# Patient Record
Sex: FEMALE | Race: WHITE | NOT HISPANIC OR LATINO | Employment: OTHER | URBAN - METROPOLITAN AREA
[De-identification: names, ages, dates, MRNs, and addresses within clinical notes are randomized per-mention and may not be internally consistent; named-entity substitution may affect disease eponyms.]

---

## 2017-02-24 ENCOUNTER — APPOINTMENT (OUTPATIENT)
Dept: LAB | Facility: CLINIC | Age: 82
End: 2017-02-24
Payer: COMMERCIAL

## 2017-02-24 ENCOUNTER — TRANSCRIBE ORDERS (OUTPATIENT)
Dept: LAB | Facility: CLINIC | Age: 82
End: 2017-02-24

## 2017-02-24 DIAGNOSIS — I10 ESSENTIAL (PRIMARY) HYPERTENSION: ICD-10-CM

## 2017-02-24 DIAGNOSIS — R06.02 SHORTNESS OF BREATH: ICD-10-CM

## 2017-02-24 LAB
ALBUMIN SERPL BCP-MCNC: 3.5 G/DL (ref 3.5–5)
ALP SERPL-CCNC: 61 U/L (ref 46–116)
ALT SERPL W P-5'-P-CCNC: 12 U/L (ref 12–78)
ANION GAP SERPL CALCULATED.3IONS-SCNC: 5 MMOL/L (ref 4–13)
AST SERPL W P-5'-P-CCNC: 10 U/L (ref 5–45)
BASOPHILS # BLD AUTO: 0.02 THOUSANDS/ΜL (ref 0–0.1)
BASOPHILS NFR BLD AUTO: 0 % (ref 0–1)
BILIRUB SERPL-MCNC: 0.3 MG/DL (ref 0.2–1)
BUN SERPL-MCNC: 21 MG/DL (ref 5–25)
CALCIUM SERPL-MCNC: 8.9 MG/DL (ref 8.3–10.1)
CHLORIDE SERPL-SCNC: 102 MMOL/L (ref 100–108)
CHOLEST SERPL-MCNC: 144 MG/DL (ref 50–200)
CO2 SERPL-SCNC: 33 MMOL/L (ref 21–32)
CREAT SERPL-MCNC: 0.67 MG/DL (ref 0.6–1.3)
EOSINOPHIL # BLD AUTO: 0.19 THOUSAND/ΜL (ref 0–0.61)
EOSINOPHIL NFR BLD AUTO: 2 % (ref 0–6)
ERYTHROCYTE [DISTWIDTH] IN BLOOD BY AUTOMATED COUNT: 13.9 % (ref 11.6–15.1)
GFR SERPL CREATININE-BSD FRML MDRD: >60 ML/MIN/1.73SQ M
GLUCOSE SERPL-MCNC: 96 MG/DL (ref 65–140)
HCT VFR BLD AUTO: 40.8 % (ref 34.8–46.1)
HDLC SERPL-MCNC: 74 MG/DL (ref 40–60)
HGB BLD-MCNC: 12.8 G/DL (ref 11.5–15.4)
LDLC SERPL CALC-MCNC: 48 MG/DL (ref 0–100)
LYMPHOCYTES # BLD AUTO: 2.71 THOUSANDS/ΜL (ref 0.6–4.47)
LYMPHOCYTES NFR BLD AUTO: 31 % (ref 14–44)
MCH RBC QN AUTO: 31.2 PG (ref 26.8–34.3)
MCHC RBC AUTO-ENTMCNC: 31.4 G/DL (ref 31.4–37.4)
MCV RBC AUTO: 100 FL (ref 82–98)
MONOCYTES # BLD AUTO: 0.43 THOUSAND/ΜL (ref 0.17–1.22)
MONOCYTES NFR BLD AUTO: 5 % (ref 4–12)
NEUTROPHILS # BLD AUTO: 5.41 THOUSANDS/ΜL (ref 1.85–7.62)
NEUTS SEG NFR BLD AUTO: 62 % (ref 43–75)
NRBC BLD AUTO-RTO: 0 /100 WBCS
PLATELET # BLD AUTO: 329 THOUSANDS/UL (ref 149–390)
PMV BLD AUTO: 9.7 FL (ref 8.9–12.7)
POTASSIUM SERPL-SCNC: 4 MMOL/L (ref 3.5–5.3)
PROT SERPL-MCNC: 7.5 G/DL (ref 6.4–8.2)
RBC # BLD AUTO: 4.1 MILLION/UL (ref 3.81–5.12)
SODIUM SERPL-SCNC: 140 MMOL/L (ref 136–145)
TRIGL SERPL-MCNC: 111 MG/DL
WBC # BLD AUTO: 8.78 THOUSAND/UL (ref 4.31–10.16)

## 2017-02-24 PROCEDURE — 80061 LIPID PANEL: CPT

## 2017-02-24 PROCEDURE — 85025 COMPLETE CBC W/AUTO DIFF WBC: CPT

## 2017-02-24 PROCEDURE — 80053 COMPREHEN METABOLIC PANEL: CPT

## 2017-02-24 PROCEDURE — 36415 COLL VENOUS BLD VENIPUNCTURE: CPT

## 2017-03-01 ENCOUNTER — ALLSCRIPTS OFFICE VISIT (OUTPATIENT)
Dept: OTHER | Facility: OTHER | Age: 82
End: 2017-03-01

## 2017-06-13 ENCOUNTER — APPOINTMENT (EMERGENCY)
Dept: RADIOLOGY | Facility: HOSPITAL | Age: 82
End: 2017-06-13
Payer: COMMERCIAL

## 2017-06-13 ENCOUNTER — ANESTHESIA (EMERGENCY)
Dept: PERIOP | Facility: HOSPITAL | Age: 82
End: 2017-06-13
Payer: COMMERCIAL

## 2017-06-13 ENCOUNTER — GENERIC CONVERSION - ENCOUNTER (OUTPATIENT)
Dept: OTHER | Facility: OTHER | Age: 82
End: 2017-06-13

## 2017-06-13 ENCOUNTER — ANESTHESIA EVENT (EMERGENCY)
Dept: PERIOP | Facility: HOSPITAL | Age: 82
End: 2017-06-13
Payer: COMMERCIAL

## 2017-06-13 ENCOUNTER — HOSPITAL ENCOUNTER (EMERGENCY)
Facility: HOSPITAL | Age: 82
Discharge: HOME | End: 2017-06-14
Attending: EMERGENCY MEDICINE | Admitting: INTERNAL MEDICINE
Payer: COMMERCIAL

## 2017-06-13 DIAGNOSIS — T18.108A ESOPHAGEAL FOREIGN BODY, INITIAL ENCOUNTER: Primary | ICD-10-CM

## 2017-06-13 LAB
ALBUMIN SERPL BCP-MCNC: 3.7 G/DL (ref 3.5–5)
ALP SERPL-CCNC: 72 U/L (ref 46–116)
ALT SERPL W P-5'-P-CCNC: 14 U/L (ref 12–78)
ANION GAP SERPL CALCULATED.3IONS-SCNC: 6 MMOL/L (ref 4–13)
AST SERPL W P-5'-P-CCNC: 16 U/L (ref 5–45)
BASOPHILS # BLD AUTO: 0.06 THOUSANDS/ΜL (ref 0–0.1)
BASOPHILS NFR BLD AUTO: 1 % (ref 0–1)
BILIRUB DIRECT SERPL-MCNC: 0.12 MG/DL (ref 0–0.2)
BILIRUB SERPL-MCNC: 0.4 MG/DL (ref 0.2–1)
BUN SERPL-MCNC: 14 MG/DL (ref 5–25)
CALCIUM SERPL-MCNC: 9.5 MG/DL (ref 8.3–10.1)
CHLORIDE SERPL-SCNC: 101 MMOL/L (ref 100–108)
CO2 SERPL-SCNC: 33 MMOL/L (ref 21–32)
CREAT SERPL-MCNC: 0.65 MG/DL (ref 0.6–1.3)
EOSINOPHIL # BLD AUTO: 0.15 THOUSAND/ΜL (ref 0–0.61)
EOSINOPHIL NFR BLD AUTO: 2 % (ref 0–6)
ERYTHROCYTE [DISTWIDTH] IN BLOOD BY AUTOMATED COUNT: 12.7 % (ref 11.6–15.1)
GFR SERPL CREATININE-BSD FRML MDRD: >60 ML/MIN/1.73SQ M
GLUCOSE SERPL-MCNC: 139 MG/DL (ref 65–140)
HCT VFR BLD AUTO: 43.4 % (ref 34.8–46.1)
HGB BLD-MCNC: 13.6 G/DL (ref 11.5–15.4)
LYMPHOCYTES # BLD AUTO: 1.45 THOUSANDS/ΜL (ref 0.6–4.47)
LYMPHOCYTES NFR BLD AUTO: 15 % (ref 14–44)
MCH RBC QN AUTO: 30.9 PG (ref 26.8–34.3)
MCHC RBC AUTO-ENTMCNC: 31.3 G/DL (ref 31.4–37.4)
MCV RBC AUTO: 99 FL (ref 82–98)
MONOCYTES # BLD AUTO: 0.38 THOUSAND/ΜL (ref 0.17–1.22)
MONOCYTES NFR BLD AUTO: 4 % (ref 4–12)
NEUTROPHILS # BLD AUTO: 7.84 THOUSANDS/ΜL (ref 1.85–7.62)
NEUTS SEG NFR BLD AUTO: 79 % (ref 43–75)
NRBC BLD AUTO-RTO: 0 /100 WBCS
PLATELET # BLD AUTO: 286 THOUSANDS/UL (ref 149–390)
PMV BLD AUTO: 8.8 FL (ref 8.9–12.7)
POTASSIUM SERPL-SCNC: 4.5 MMOL/L (ref 3.5–5.3)
PROT SERPL-MCNC: 7.9 G/DL (ref 6.4–8.2)
RBC # BLD AUTO: 4.4 MILLION/UL (ref 3.81–5.12)
SODIUM SERPL-SCNC: 140 MMOL/L (ref 136–145)
TROPONIN I SERPL-MCNC: <0.02 NG/ML
WBC # BLD AUTO: 9.93 THOUSAND/UL (ref 4.31–10.16)

## 2017-06-13 PROCEDURE — 71010 HB CHEST X-RAY 1 VIEW FRONTAL (PORTABLE): CPT

## 2017-06-13 PROCEDURE — 84484 ASSAY OF TROPONIN QUANT: CPT | Performed by: EMERGENCY MEDICINE

## 2017-06-13 PROCEDURE — 80048 BASIC METABOLIC PNL TOTAL CA: CPT | Performed by: EMERGENCY MEDICINE

## 2017-06-13 PROCEDURE — 80076 HEPATIC FUNCTION PANEL: CPT | Performed by: EMERGENCY MEDICINE

## 2017-06-13 PROCEDURE — 36415 COLL VENOUS BLD VENIPUNCTURE: CPT | Performed by: EMERGENCY MEDICINE

## 2017-06-13 PROCEDURE — 94640 AIRWAY INHALATION TREATMENT: CPT

## 2017-06-13 PROCEDURE — 99285 EMERGENCY DEPT VISIT HI MDM: CPT

## 2017-06-13 PROCEDURE — 85025 COMPLETE CBC W/AUTO DIFF WBC: CPT | Performed by: EMERGENCY MEDICINE

## 2017-06-13 PROCEDURE — 93005 ELECTROCARDIOGRAM TRACING: CPT | Performed by: EMERGENCY MEDICINE

## 2017-06-13 RX ORDER — PROPOFOL 10 MG/ML
INJECTION, EMULSION INTRAVENOUS AS NEEDED
Status: DISCONTINUED | OUTPATIENT
Start: 2017-06-13 | End: 2017-06-13 | Stop reason: SURG

## 2017-06-13 RX ORDER — ASPIRIN 81 MG/1
81 TABLET, CHEWABLE ORAL DAILY
COMMUNITY
End: 2019-03-15 | Stop reason: ALTCHOICE

## 2017-06-13 RX ORDER — PANTOPRAZOLE SODIUM 40 MG/1
40 TABLET, DELAYED RELEASE ORAL DAILY
Qty: 30 TABLET | Refills: 3 | Status: SHIPPED | OUTPATIENT
Start: 2017-06-13 | End: 2017-10-14

## 2017-06-13 RX ORDER — SODIUM CHLORIDE 9 MG/ML
INJECTION, SOLUTION INTRAVENOUS CONTINUOUS PRN
Status: DISCONTINUED | OUTPATIENT
Start: 2017-06-13 | End: 2017-06-13 | Stop reason: SURG

## 2017-06-13 RX ORDER — ESMOLOL HYDROCHLORIDE 10 MG/ML
INJECTION INTRAVENOUS AS NEEDED
Status: DISCONTINUED | OUTPATIENT
Start: 2017-06-13 | End: 2017-06-13 | Stop reason: SURG

## 2017-06-13 RX ORDER — ALBUTEROL SULFATE 2.5 MG/3ML
5 SOLUTION RESPIRATORY (INHALATION) ONCE
Status: COMPLETED | OUTPATIENT
Start: 2017-06-13 | End: 2017-06-13

## 2017-06-13 RX ORDER — ONDANSETRON 2 MG/ML
INJECTION INTRAMUSCULAR; INTRAVENOUS AS NEEDED
Status: DISCONTINUED | OUTPATIENT
Start: 2017-06-13 | End: 2017-06-13 | Stop reason: SURG

## 2017-06-13 RX ORDER — SUCCINYLCHOLINE/SOD CL,ISO/PF 100 MG/5ML
SYRINGE (ML) INTRAVENOUS AS NEEDED
Status: DISCONTINUED | OUTPATIENT
Start: 2017-06-13 | End: 2017-06-13 | Stop reason: SURG

## 2017-06-13 RX ORDER — ROSUVASTATIN CALCIUM 10 MG/1
10 TABLET, COATED ORAL DAILY
COMMUNITY
End: 2018-09-11 | Stop reason: SDUPTHER

## 2017-06-13 RX ORDER — CLOPIDOGREL BISULFATE 75 MG/1
75 TABLET ORAL DAILY
COMMUNITY
End: 2018-09-11 | Stop reason: SDUPTHER

## 2017-06-13 RX ADMIN — ONDANSETRON 4 MG: 2 INJECTION INTRAMUSCULAR; INTRAVENOUS at 23:18

## 2017-06-13 RX ADMIN — ALBUTEROL SULFATE 5 MG: 2.5 SOLUTION RESPIRATORY (INHALATION) at 22:23

## 2017-06-13 RX ADMIN — ESMOLOL HYDROCHLORIDE 10 MG: 10 INJECTION, SOLUTION INTRAVENOUS at 23:28

## 2017-06-13 RX ADMIN — Medication 100 MG: at 23:12

## 2017-06-13 RX ADMIN — IPRATROPIUM BROMIDE 0.5 MG: 0.5 SOLUTION RESPIRATORY (INHALATION) at 22:23

## 2017-06-13 RX ADMIN — SODIUM CHLORIDE: 0.9 INJECTION, SOLUTION INTRAVENOUS at 22:49

## 2017-06-13 RX ADMIN — PROPOFOL 120 MG: 10 INJECTION, EMULSION INTRAVENOUS at 23:12

## 2017-06-14 VITALS
BODY MASS INDEX: 25.6 KG/M2 | OXYGEN SATURATION: 94 % | RESPIRATION RATE: 20 BRPM | HEART RATE: 73 BPM | SYSTOLIC BLOOD PRESSURE: 139 MMHG | WEIGHT: 139.11 LBS | HEIGHT: 62 IN | TEMPERATURE: 99.3 F | DIASTOLIC BLOOD PRESSURE: 58 MMHG

## 2017-06-14 LAB
ATRIAL RATE: 67 BPM
P AXIS: 81 DEGREES
PR INTERVAL: 218 MS
QRS AXIS: 112 DEGREES
QRSD INTERVAL: 68 MS
QT INTERVAL: 404 MS
QTC INTERVAL: 426 MS
T WAVE AXIS: 79 DEGREES
VENTRICULAR RATE: 67 BPM

## 2017-08-14 ENCOUNTER — ALLSCRIPTS OFFICE VISIT (OUTPATIENT)
Dept: OTHER | Facility: OTHER | Age: 82
End: 2017-08-14

## 2017-10-14 ENCOUNTER — APPOINTMENT (EMERGENCY)
Dept: RADIOLOGY | Facility: HOSPITAL | Age: 82
DRG: 189 | End: 2017-10-14
Payer: COMMERCIAL

## 2017-10-14 ENCOUNTER — APPOINTMENT (EMERGENCY)
Dept: CT IMAGING | Facility: HOSPITAL | Age: 82
DRG: 189 | End: 2017-10-14
Payer: COMMERCIAL

## 2017-10-14 ENCOUNTER — HOSPITAL ENCOUNTER (INPATIENT)
Facility: HOSPITAL | Age: 82
LOS: 6 days | Discharge: HOME HEALTH | DRG: 189 | End: 2017-10-21
Attending: EMERGENCY MEDICINE | Admitting: INTERNAL MEDICINE
Payer: COMMERCIAL

## 2017-10-14 DIAGNOSIS — R53.1 WEAKNESS: ICD-10-CM

## 2017-10-14 DIAGNOSIS — J44.1 COPD EXACERBATION (HCC): Primary | ICD-10-CM

## 2017-10-14 LAB
ALBUMIN SERPL BCP-MCNC: 3.3 G/DL (ref 3.5–5)
ALP SERPL-CCNC: 80 U/L (ref 46–116)
ALT SERPL W P-5'-P-CCNC: 12 U/L (ref 12–78)
ANION GAP SERPL CALCULATED.3IONS-SCNC: 6 MMOL/L (ref 4–13)
AST SERPL W P-5'-P-CCNC: 7 U/L (ref 5–45)
BACTERIA UR QL AUTO: ABNORMAL /HPF
BASOPHILS # BLD MANUAL: 0 THOUSAND/UL (ref 0–0.1)
BASOPHILS NFR MAR MANUAL: 0 % (ref 0–1)
BILIRUB SERPL-MCNC: 0.5 MG/DL (ref 0.2–1)
BILIRUB UR QL STRIP: NEGATIVE
BUN SERPL-MCNC: 18 MG/DL (ref 5–25)
CALCIUM SERPL-MCNC: 9 MG/DL (ref 8.3–10.1)
CHLORIDE SERPL-SCNC: 100 MMOL/L (ref 100–108)
CLARITY UR: ABNORMAL
CO2 SERPL-SCNC: 31 MMOL/L (ref 21–32)
COLOR UR: YELLOW
CREAT SERPL-MCNC: 0.83 MG/DL (ref 0.6–1.3)
EOSINOPHIL # BLD MANUAL: 0 THOUSAND/UL (ref 0–0.4)
EOSINOPHIL NFR BLD MANUAL: 0 % (ref 0–6)
ERYTHROCYTE [DISTWIDTH] IN BLOOD BY AUTOMATED COUNT: 12.5 % (ref 11.6–15.1)
GFR SERPL CREATININE-BSD FRML MDRD: 64 ML/MIN/1.73SQ M
GLUCOSE SERPL-MCNC: 137 MG/DL (ref 65–140)
GLUCOSE UR STRIP-MCNC: NEGATIVE MG/DL
HCT VFR BLD AUTO: 39.1 % (ref 34.8–46.1)
HGB BLD-MCNC: 12.6 G/DL (ref 11.5–15.4)
HGB UR QL STRIP.AUTO: ABNORMAL
HOLD SPECIMEN: NORMAL
KETONES UR STRIP-MCNC: ABNORMAL MG/DL
LEUKOCYTE ESTERASE UR QL STRIP: NEGATIVE
LYMPHOCYTES # BLD AUTO: 0.91 THOUSAND/UL (ref 0.6–4.47)
LYMPHOCYTES # BLD AUTO: 6 % (ref 14–44)
MCH RBC QN AUTO: 30.8 PG (ref 26.8–34.3)
MCHC RBC AUTO-ENTMCNC: 32.2 G/DL (ref 31.4–37.4)
MCV RBC AUTO: 96 FL (ref 82–98)
MONOCYTES # BLD AUTO: 0.3 THOUSAND/UL (ref 0–1.22)
MONOCYTES NFR BLD: 2 % (ref 4–12)
NEUTROPHILS # BLD MANUAL: 13.97 THOUSAND/UL (ref 1.85–7.62)
NEUTS SEG NFR BLD AUTO: 92 % (ref 43–75)
NITRITE UR QL STRIP: NEGATIVE
NON-SQ EPI CELLS URNS QL MICRO: ABNORMAL /HPF
NRBC BLD AUTO-RTO: 0 /100 WBCS
PH UR STRIP.AUTO: 5.5 [PH] (ref 4.5–8)
PLATELET # BLD AUTO: 295 THOUSANDS/UL (ref 149–390)
PLATELET BLD QL SMEAR: ADEQUATE
PMV BLD AUTO: 8.7 FL (ref 8.9–12.7)
POTASSIUM SERPL-SCNC: 4 MMOL/L (ref 3.5–5.3)
PROT SERPL-MCNC: 7.8 G/DL (ref 6.4–8.2)
PROT UR STRIP-MCNC: NEGATIVE MG/DL
RBC # BLD AUTO: 4.09 MILLION/UL (ref 3.81–5.12)
RBC #/AREA URNS AUTO: ABNORMAL /HPF
SODIUM SERPL-SCNC: 137 MMOL/L (ref 136–145)
SP GR UR STRIP.AUTO: <=1.005 (ref 1–1.03)
TOTAL CELLS COUNTED SPEC: 100
UROBILINOGEN UR QL STRIP.AUTO: 2 E.U./DL
WBC # BLD AUTO: 15.18 THOUSAND/UL (ref 4.31–10.16)
WBC #/AREA URNS AUTO: ABNORMAL /HPF

## 2017-10-14 PROCEDURE — 71020 HB CHEST X-RAY 2VW FRONTAL&LATL: CPT

## 2017-10-14 PROCEDURE — 85007 BL SMEAR W/DIFF WBC COUNT: CPT | Performed by: EMERGENCY MEDICINE

## 2017-10-14 PROCEDURE — 96375 TX/PRO/DX INJ NEW DRUG ADDON: CPT

## 2017-10-14 PROCEDURE — 96361 HYDRATE IV INFUSION ADD-ON: CPT

## 2017-10-14 PROCEDURE — 93005 ELECTROCARDIOGRAM TRACING: CPT | Performed by: EMERGENCY MEDICINE

## 2017-10-14 PROCEDURE — 85027 COMPLETE CBC AUTOMATED: CPT | Performed by: EMERGENCY MEDICINE

## 2017-10-14 PROCEDURE — 74177 CT ABD & PELVIS W/CONTRAST: CPT

## 2017-10-14 PROCEDURE — 80053 COMPREHEN METABOLIC PANEL: CPT | Performed by: EMERGENCY MEDICINE

## 2017-10-14 PROCEDURE — 94640 AIRWAY INHALATION TREATMENT: CPT

## 2017-10-14 PROCEDURE — 81001 URINALYSIS AUTO W/SCOPE: CPT | Performed by: EMERGENCY MEDICINE

## 2017-10-14 PROCEDURE — 96374 THER/PROPH/DIAG INJ IV PUSH: CPT

## 2017-10-14 PROCEDURE — 71275 CT ANGIOGRAPHY CHEST: CPT

## 2017-10-14 PROCEDURE — 36415 COLL VENOUS BLD VENIPUNCTURE: CPT

## 2017-10-14 RX ORDER — ALBUTEROL SULFATE 2.5 MG/3ML
2.5 SOLUTION RESPIRATORY (INHALATION) ONCE
Status: COMPLETED | OUTPATIENT
Start: 2017-10-14 | End: 2017-10-14

## 2017-10-14 RX ORDER — PANTOPRAZOLE SODIUM 40 MG/1
TABLET, DELAYED RELEASE ORAL
COMMUNITY
Start: 2017-06-16 | End: 2018-03-02 | Stop reason: SDUPTHER

## 2017-10-14 RX ORDER — ONDANSETRON 2 MG/ML
4 INJECTION INTRAMUSCULAR; INTRAVENOUS ONCE
Status: COMPLETED | OUTPATIENT
Start: 2017-10-14 | End: 2017-10-14

## 2017-10-14 RX ORDER — FENTANYL CITRATE 50 UG/ML
50 INJECTION, SOLUTION INTRAMUSCULAR; INTRAVENOUS ONCE
Status: COMPLETED | OUTPATIENT
Start: 2017-10-14 | End: 2017-10-14

## 2017-10-14 RX ADMIN — IOHEXOL 100 ML: 350 INJECTION, SOLUTION INTRAVENOUS at 22:36

## 2017-10-14 RX ADMIN — ALBUTEROL SULFATE 2.5 MG: 2.5 SOLUTION RESPIRATORY (INHALATION) at 21:19

## 2017-10-14 RX ADMIN — FENTANYL CITRATE 50 MCG: 50 INJECTION INTRAMUSCULAR; INTRAVENOUS at 23:17

## 2017-10-14 RX ADMIN — SODIUM CHLORIDE 500 ML: 0.9 INJECTION, SOLUTION INTRAVENOUS at 21:20

## 2017-10-14 RX ADMIN — ONDANSETRON 4 MG: 2 INJECTION INTRAMUSCULAR; INTRAVENOUS at 20:11

## 2017-10-14 RX ADMIN — IPRATROPIUM BROMIDE 0.5 MG: 0.5 SOLUTION RESPIRATORY (INHALATION) at 21:19

## 2017-10-15 PROBLEM — J44.1 COPD EXACERBATION (HCC): Status: ACTIVE | Noted: 2017-10-15

## 2017-10-15 PROBLEM — I10 HTN (HYPERTENSION): Chronic | Status: ACTIVE | Noted: 2017-10-15

## 2017-10-15 PROBLEM — I25.10 CAD (CORONARY ARTERY DISEASE): Chronic | Status: ACTIVE | Noted: 2017-10-15

## 2017-10-15 LAB
ANION GAP SERPL CALCULATED.3IONS-SCNC: 8 MMOL/L (ref 4–13)
ATRIAL RATE: 101 BPM
BUN SERPL-MCNC: 15 MG/DL (ref 5–25)
CALCIUM SERPL-MCNC: 8.8 MG/DL (ref 8.3–10.1)
CHLORIDE SERPL-SCNC: 100 MMOL/L (ref 100–108)
CO2 SERPL-SCNC: 30 MMOL/L (ref 21–32)
CREAT SERPL-MCNC: 0.7 MG/DL (ref 0.6–1.3)
ERYTHROCYTE [DISTWIDTH] IN BLOOD BY AUTOMATED COUNT: 12.8 % (ref 11.6–15.1)
GFR SERPL CREATININE-BSD FRML MDRD: 78 ML/MIN/1.73SQ M
GLUCOSE P FAST SERPL-MCNC: 133 MG/DL (ref 65–99)
GLUCOSE SERPL-MCNC: 133 MG/DL (ref 65–140)
HCT VFR BLD AUTO: 36.9 % (ref 34.8–46.1)
HGB BLD-MCNC: 11.6 G/DL (ref 11.5–15.4)
MCH RBC QN AUTO: 30.7 PG (ref 26.8–34.3)
MCHC RBC AUTO-ENTMCNC: 31.4 G/DL (ref 31.4–37.4)
MCV RBC AUTO: 98 FL (ref 82–98)
P AXIS: 95 DEGREES
PLATELET # BLD AUTO: 287 THOUSANDS/UL (ref 149–390)
PMV BLD AUTO: 9.9 FL (ref 8.9–12.7)
POTASSIUM SERPL-SCNC: 4.2 MMOL/L (ref 3.5–5.3)
PR INTERVAL: 218 MS
QRS AXIS: 133 DEGREES
QRSD INTERVAL: 86 MS
QT INTERVAL: 344 MS
QTC INTERVAL: 446 MS
RBC # BLD AUTO: 3.78 MILLION/UL (ref 3.81–5.12)
SODIUM SERPL-SCNC: 138 MMOL/L (ref 136–145)
T WAVE AXIS: 75 DEGREES
TROPONIN I SERPL-MCNC: <0.02 NG/ML
VENTRICULAR RATE: 101 BPM
WBC # BLD AUTO: 13.62 THOUSAND/UL (ref 4.31–10.16)

## 2017-10-15 PROCEDURE — 80048 BASIC METABOLIC PNL TOTAL CA: CPT | Performed by: PHYSICIAN ASSISTANT

## 2017-10-15 PROCEDURE — 94760 N-INVAS EAR/PLS OXIMETRY 1: CPT

## 2017-10-15 PROCEDURE — 94640 AIRWAY INHALATION TREATMENT: CPT

## 2017-10-15 PROCEDURE — 84484 ASSAY OF TROPONIN QUANT: CPT | Performed by: INTERNAL MEDICINE

## 2017-10-15 PROCEDURE — 99285 EMERGENCY DEPT VISIT HI MDM: CPT

## 2017-10-15 PROCEDURE — 85027 COMPLETE CBC AUTOMATED: CPT | Performed by: PHYSICIAN ASSISTANT

## 2017-10-15 RX ORDER — METHYLPREDNISOLONE SODIUM SUCCINATE 40 MG/ML
40 INJECTION, POWDER, LYOPHILIZED, FOR SOLUTION INTRAMUSCULAR; INTRAVENOUS EVERY 8 HOURS SCHEDULED
Status: DISCONTINUED | OUTPATIENT
Start: 2017-10-15 | End: 2017-10-16

## 2017-10-15 RX ORDER — CALCIUM CARBONATE 200(500)MG
1000 TABLET,CHEWABLE ORAL DAILY PRN
Status: DISCONTINUED | OUTPATIENT
Start: 2017-10-15 | End: 2017-10-21 | Stop reason: HOSPADM

## 2017-10-15 RX ORDER — IPRATROPIUM BROMIDE AND ALBUTEROL SULFATE 2.5; .5 MG/3ML; MG/3ML
3 SOLUTION RESPIRATORY (INHALATION)
Status: DISCONTINUED | OUTPATIENT
Start: 2017-10-15 | End: 2017-10-15

## 2017-10-15 RX ORDER — SODIUM CHLORIDE FOR INHALATION 0.9 %
3 VIAL, NEBULIZER (ML) INHALATION EVERY 4 HOURS PRN
Status: DISCONTINUED | OUTPATIENT
Start: 2017-10-15 | End: 2017-10-18

## 2017-10-15 RX ORDER — PRAVASTATIN SODIUM 80 MG/1
80 TABLET ORAL
Status: DISCONTINUED | OUTPATIENT
Start: 2017-10-15 | End: 2017-10-21 | Stop reason: HOSPADM

## 2017-10-15 RX ORDER — AZITHROMYCIN 250 MG/1
500 TABLET, FILM COATED ORAL DAILY
Status: DISCONTINUED | OUTPATIENT
Start: 2017-10-15 | End: 2017-10-15

## 2017-10-15 RX ORDER — CLOPIDOGREL BISULFATE 75 MG/1
75 TABLET ORAL DAILY
Status: DISCONTINUED | OUTPATIENT
Start: 2017-10-15 | End: 2017-10-21 | Stop reason: HOSPADM

## 2017-10-15 RX ORDER — ALBUTEROL SULFATE 2.5 MG/3ML
2.5 SOLUTION RESPIRATORY (INHALATION) EVERY 4 HOURS PRN
Status: DISCONTINUED | OUTPATIENT
Start: 2017-10-15 | End: 2017-10-21 | Stop reason: HOSPADM

## 2017-10-15 RX ORDER — METHYLPREDNISOLONE SODIUM SUCCINATE 40 MG/ML
40 INJECTION, POWDER, LYOPHILIZED, FOR SOLUTION INTRAMUSCULAR; INTRAVENOUS EVERY 12 HOURS SCHEDULED
Status: DISCONTINUED | OUTPATIENT
Start: 2017-10-15 | End: 2017-10-15

## 2017-10-15 RX ORDER — DOCUSATE SODIUM 100 MG/1
100 CAPSULE, LIQUID FILLED ORAL 2 TIMES DAILY
Status: DISCONTINUED | OUTPATIENT
Start: 2017-10-15 | End: 2017-10-21 | Stop reason: HOSPADM

## 2017-10-15 RX ORDER — AZITHROMYCIN 250 MG/1
500 TABLET, FILM COATED ORAL DAILY
Status: DISCONTINUED | OUTPATIENT
Start: 2017-10-16 | End: 2017-10-15

## 2017-10-15 RX ORDER — ASPIRIN 81 MG/1
81 TABLET, CHEWABLE ORAL DAILY
Status: DISCONTINUED | OUTPATIENT
Start: 2017-10-15 | End: 2017-10-21 | Stop reason: HOSPADM

## 2017-10-15 RX ORDER — MORPHINE SULFATE 2 MG/ML
2 INJECTION, SOLUTION INTRAMUSCULAR; INTRAVENOUS EVERY 4 HOURS PRN
Status: DISCONTINUED | OUTPATIENT
Start: 2017-10-15 | End: 2017-10-21 | Stop reason: HOSPADM

## 2017-10-15 RX ORDER — HYDROCODONE BITARTRATE AND ACETAMINOPHEN 5; 325 MG/1; MG/1
1 TABLET ORAL EVERY 4 HOURS PRN
Status: DISCONTINUED | OUTPATIENT
Start: 2017-10-15 | End: 2017-10-21 | Stop reason: HOSPADM

## 2017-10-15 RX ORDER — IPRATROPIUM BROMIDE AND ALBUTEROL SULFATE 2.5; .5 MG/3ML; MG/3ML
3 SOLUTION RESPIRATORY (INHALATION)
Status: DISCONTINUED | OUTPATIENT
Start: 2017-10-15 | End: 2017-10-20

## 2017-10-15 RX ORDER — MORPHINE SULFATE 2 MG/ML
1 INJECTION, SOLUTION INTRAMUSCULAR; INTRAVENOUS EVERY 4 HOURS PRN
Status: DISCONTINUED | OUTPATIENT
Start: 2017-10-15 | End: 2017-10-15

## 2017-10-15 RX ORDER — AZITHROMYCIN 250 MG/1
500 TABLET, FILM COATED ORAL DAILY
Status: DISCONTINUED | OUTPATIENT
Start: 2017-10-16 | End: 2017-10-16

## 2017-10-15 RX ORDER — LIDOCAINE 50 MG/G
2 PATCH TOPICAL DAILY
Status: DISCONTINUED | OUTPATIENT
Start: 2017-10-15 | End: 2017-10-18

## 2017-10-15 RX ORDER — BENZONATATE 100 MG/1
100 CAPSULE ORAL 3 TIMES DAILY PRN
Status: DISCONTINUED | OUTPATIENT
Start: 2017-10-15 | End: 2017-10-21 | Stop reason: HOSPADM

## 2017-10-15 RX ADMIN — ASPIRIN 81 MG 81 MG: 81 TABLET ORAL at 09:22

## 2017-10-15 RX ADMIN — METOPROLOL TARTRATE 25 MG: 25 TABLET ORAL at 09:22

## 2017-10-15 RX ADMIN — ENOXAPARIN SODIUM 30 MG: 30 INJECTION SUBCUTANEOUS at 09:23

## 2017-10-15 RX ADMIN — LIDOCAINE 2 PATCH: 50 PATCH CUTANEOUS at 11:07

## 2017-10-15 RX ADMIN — IPRATROPIUM BROMIDE AND ALBUTEROL SULFATE 3 ML: .5; 3 SOLUTION RESPIRATORY (INHALATION) at 21:44

## 2017-10-15 RX ADMIN — HYDROCODONE BITARTRATE AND ACETAMINOPHEN 1 TABLET: 5; 325 TABLET ORAL at 12:06

## 2017-10-15 RX ADMIN — METHYLPREDNISOLONE SODIUM SUCCINATE 40 MG: 40 INJECTION, POWDER, FOR SOLUTION INTRAMUSCULAR; INTRAVENOUS at 21:49

## 2017-10-15 RX ADMIN — IPRATROPIUM BROMIDE AND ALBUTEROL SULFATE 3 ML: .5; 3 SOLUTION RESPIRATORY (INHALATION) at 13:08

## 2017-10-15 RX ADMIN — METHYLPREDNISOLONE SODIUM SUCCINATE 40 MG: 40 INJECTION, POWDER, FOR SOLUTION INTRAMUSCULAR; INTRAVENOUS at 15:00

## 2017-10-15 RX ADMIN — DOCUSATE SODIUM 100 MG: 100 CAPSULE, LIQUID FILLED ORAL at 17:19

## 2017-10-15 RX ADMIN — METHYLPREDNISOLONE SODIUM SUCCINATE 40 MG: 40 INJECTION, POWDER, FOR SOLUTION INTRAMUSCULAR; INTRAVENOUS at 09:23

## 2017-10-15 RX ADMIN — AZITHROMYCIN 500 MG: 250 TABLET, FILM COATED ORAL at 05:30

## 2017-10-15 RX ADMIN — MORPHINE SULFATE 1 MG: 2 INJECTION, SOLUTION INTRAMUSCULAR; INTRAVENOUS at 09:19

## 2017-10-15 RX ADMIN — DOCUSATE SODIUM 100 MG: 100 CAPSULE, LIQUID FILLED ORAL at 09:22

## 2017-10-15 RX ADMIN — METOPROLOL TARTRATE 25 MG: 25 TABLET ORAL at 17:19

## 2017-10-15 RX ADMIN — CLOPIDOGREL BISULFATE 75 MG: 75 TABLET ORAL at 09:22

## 2017-10-15 NOTE — PLAN OF CARE
DISCHARGE PLANNING     Discharge to home or other facility with appropriate resources Progressing        INFECTION - ADULT     Absence or prevention of progression during hospitalization Progressing     Absence of fever/infection during neutropenic period Progressing        Knowledge Deficit     Patient/family/caregiver demonstrates understanding of disease process, treatment plan, medications, and discharge instructions Progressing        PAIN - ADULT     Verbalizes/displays adequate comfort level or baseline comfort level Progressing        Potential for Falls     Patient will remain free of falls Progressing        SAFETY ADULT     Maintain or return to baseline ADL function Progressing     Maintain or return mobility status to optimal level Progressing     Patient will remain free of falls Progressing

## 2017-10-15 NOTE — ED NOTES
TREV at bedside      [facility], [address] [place] [address]  10/15/17 5494

## 2017-10-15 NOTE — ED PROVIDER NOTES
History  Chief Complaint   Patient presents with    Flank Pain     Pt c/o left sided flank pain that started 3 days ago  Pt reports weakness and N/V  I did not see this patient  Please reassign to Dr Jade Abel  Prior to Admission Medications   Prescriptions Last Dose Informant Patient Reported? Taking?   aspirin 81 mg chewable tablet   Yes Yes   Sig: Chew 81 mg daily   clopidogrel (PLAVIX) 75 mg tablet   Yes Yes   Sig: Take 75 mg by mouth daily   metoprolol tartrate (LOPRESSOR) 25 mg tablet   Yes Yes   Sig: Take 25 mg by mouth 2 (two) times a day   pantoprazole (PROTONIX) 40 mg tablet   Yes Yes   Sig: Take by mouth   rosuvastatin (CRESTOR) 10 MG tablet   Yes Yes   Sig: Take 10 mg by mouth daily      Facility-Administered Medications: None       Past Medical History:   Diagnosis Date    Cardiac disease     COPD (chronic obstructive pulmonary disease) (HCC)     Hypertension     MI (myocardial infarction)     Stroke [facility])        Past Surgical History:   Procedure Laterality Date    APPENDECTOMY      BREAST SURGERY      CHOLECYSTECTOMY      ESOPHAGUS FOREIGN BODY REMOVAL N/A 6/13/2017    Procedure: REMOVAL FOREIGN BODY ESOPHAGUS;  Surgeon: Trinh Barrera MD;  Location: [facility] OR;  Service: Gastroenterology       History reviewed  No pertinent family history  I have reviewed and agree with the history as documented      Social History   Substance Use Topics    Smoking status: Former Smoker    Smokeless tobacco: Former User    Alcohol use No        Review of Systems    Physical Exam  ED Triage Vitals [10/14/17 1914]   Temperature Pulse Respirations Blood Pressure SpO2   98 3 °F (36 8 °C) 102 22 141/84 94 %      Temp Source Heart Rate Source Patient Position - Orthostatic VS BP Location FiO2 (%)   Oral Monitor Lying Right arm --      Pain Score       5           Physical Exam    ED Medications  Medications   aspirin chewable tablet 81 mg (81 mg Oral Given 10/15/17 0922)   clopidogrel (PLAVIX) tablet 75 mg (75 mg Oral Given 10/15/17 0922)   metoprolol tartrate (LOPRESSOR) tablet 25 mg (25 mg Oral Given 10/15/17 0922)   pravastatin (PRAVACHOL) tablet 80 mg (not administered)   docusate sodium (COLACE) capsule 100 mg (100 mg Oral Given 10/15/17 0922)   calcium carbonate (TUMS) chewable tablet 1,000 mg (not administered)   albuterol inhalation solution 2 5 mg (not administered)     And   sodium chloride 0 9 % inhalation solution 3 mL (not administered)   enoxaparin (LOVENOX) subcutaneous injection 30 mg (30 mg Subcutaneous Given 10/15/17 0923)   lidocaine (LIDODERM) 5 % patch 2 patch (not administered)   HYDROcodone-acetaminophen (NORCO) 5-325 mg per tablet 1 tablet (not administered)   methylPREDNISolone sodium succinate (Solu-MEDROL) injection 40 mg (not administered)   morphine injection 2 mg (not administered)   azithromycin (ZITHROMAX) tablet 500 mg (not administered)   benzonatate (TESSALON PERLES) capsule 100 mg (not administered)   ipratropium-albuterol (DUO-NEB) 0 5-2 5 mg/mL inhalation solution 3 mL (not administered)   ondansetron (ZOFRAN) injection 4 mg (4 mg Intravenous Given 10/14/17 2011)   albuterol inhalation solution 2 5 mg (2 5 mg Nebulization Given 10/14/17 2119)   ipratropium (ATROVENT) 0 02 % inhalation solution 0 5 mg (0 5 mg Nebulization Given 10/14/17 2119)   sodium chloride 0 9 % bolus 500 mL (0 mL Intravenous Stopped 10/14/17 2220)   iohexol (OMNIPAQUE) 350 MG/ML injection (MULTI-DOSE) 100 mL (100 mL Intravenous Given 10/14/17 2236)   fentanyl citrate (PF) 100 MCG/2ML 50 mcg (50 mcg Intravenous Given 10/14/17 2317)       Diagnostic Studies  Labs Reviewed   CBC AND DIFFERENTIAL - Abnormal        Result Value Ref Range Status    WBC 15 18 (*) 4 31 - 10 16 Thousand/uL Final    MPV 8 7 (*) 8 9 - 12 7 fL Final    RBC 4 09  3 81 - 5 12 Million/uL Final    Hemoglobin 12 6  11 5 - 15 4 g/dL Final    Hematocrit 39 1  34 8 - 46 1 % Final    MCV 96  82 - 98 fL Final    MCH 30 8  26 8 - 34 3 pg Final    MCHC 32 2  31 4 - 37 4 g/dL Final    RDW 12 5  11 6 - 15 1 % Final    Platelets 967  099 - 390 Thousands/uL Final    nRBC 0  /100 WBCs Final   COMPREHENSIVE METABOLIC PANEL - Abnormal     Albumin 3 3 (*) 3 5 - 5 0 g/dL Final    Sodium 137  136 - 145 mmol/L Final    Potassium 4 0  3 5 - 5 3 mmol/L Final    Chloride 100  100 - 108 mmol/L Final    CO2 31  21 - 32 mmol/L Final    Anion Gap 6  4 - 13 mmol/L Final    BUN 18  5 - 25 mg/dL Final    Creatinine 0 83  0 60 - 1 30 mg/dL Final    Comment: Standardized to IDMS reference method    Glucose 137  65 - 140 mg/dL Final    Comment:   If the patient is fasting, the ADA then defines impaired fasting glucose as > 100 mg/dL and diabetes as > or equal to 123 mg/dL  Specimen collection should occur prior to Sulfasalazine administration due to the potential for falsely depressed results  Specimen collection should occur prior to Sulfapyridine administration due to the potential for falsely elevated results  Calcium 9 0  8 3 - 10 1 mg/dL Final    AST 7  5 - 45 U/L Final    Comment:   Specimen collection should occur prior to Sulfasalazine administration due to the potential for falsely depressed results  ALT 12  12 - 78 U/L Final    Comment:   Specimen collection should occur prior to Sulfasalazine administration due to the potential for falsely depressed results  Alkaline Phosphatase 80  46 - 116 U/L Final    Total Protein 7 8  6 4 - 8 2 g/dL Final    Total Bilirubin 0 50  0 20 - 1 00 mg/dL Final    eGFR 64  ml/min/1 73sq m Final    Narrative:     National Kidney Disease Education Program recommendations are as follows:  GFR calculation is accurate only with a steady state creatinine  Chronic Kidney disease less than 60 ml/min/1 73 sq  meters  Kidney failure less than 15 ml/min/1 73 sq  meters     UA W REFLEX TO MICROSCOPIC WITH REFLEX TO CULTURE - Abnormal     Ketones, UA 15 (1+) (*) Negative mg/dl Final    Urobilinogen, UA 2 0 (*) 0 2, 1 0 E U /dl E U /dl Final    Blood, UA Small (*) Negative Final    Color, UA Yellow   Final    Clarity, UA Slightly Cloudy   Final    Specific Gravity, UA <=1 005  1 003 - 1 030 Final    pH, UA 5 5  4 5 - 8 0 Final    Leukocytes, UA Negative  Negative Final    Nitrite, UA Negative  Negative Final    Protein, UA Negative  Negative mg/dl Final    Glucose, UA Negative  Negative mg/dl Final    Bilirubin, UA Negative  Negative Final   URINE MICROSCOPIC - Abnormal     RBC, UA 2-4 (*) None Seen, 0-5 /hpf Final    WBC, UA None Seen  None Seen, 0-5, 5-55, 5-65 /hpf Final    Epithelial Cells Occasional  None Seen, Occasional /hpf Final    Bacteria, UA None Seen  None Seen, Occasional /hpf Final   MANUAL DIFFERENTIAL(PHLEBS DO NOT ORDER) - Abnormal     Segmented % 92 (*) 43 - 75 % Final    Lymphocytes % 6 (*) 14 - 44 % Final    Monocytes % 2 (*) 4 - 12 % Final    Absolute Neutrophils 13 97 (*) 1 85 - 7 62 Thousand/uL Final    Eosinophils % 0  0 - 6 % Final    Basophils % 0  0 - 1 % Final    Lymphocytes Absolute 0 91  0 60 - 4 47 Thousand/uL Final    Monocytes Absolute 0 30  0 00 - 1 22 Thousand/uL Final    Eosinophils Absolute 0 00  0 00 - 0 40 Thousand/uL Final    Basophils Absolute 0 00  0 00 - 0 10 Thousand/uL Final    Total Counted 100   Final    Platelet Estimate Adequate  Adequate Final   LIGHT BLUE TOP   GREEN / BLACK TUBE ON HOLD    Extra Tube HOLD FOR ADD ONS   Final       XR chest 2 views   Final Result      No active pulmonary disease  Workstation performed: [id number]         PE Study with CT Abdomen and Pelvis with contrast   ED Interpretation   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   versus the ductus bump  The appearance is stable compared with   the prior  Central lobar emphysema and nonspecific peribronchial   cuffing  No acute findings in the abdomen/pelvis  Final Result   1  No pulmonary emboli demonstrated     2   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    3   Complex right upper renal cyst may be correlated with ultrasound  Findings are consistent with the preliminary report from [facility] which was provided shortly after completion of the exam                                            Workstation performed: [id number]             Procedures  Procedures      Phone Contacts  ED Phone Contact    ED Course  ED Course                                [facility]  CritCpascual Time    Disposition  Final diagnoses:   COPD exacerbation ([address] )   Weakness     ED Disposition     ED Disposition Condition Comment    Admit  Case was discussed with Ganesh PANDA and the patient's admission status was agreed to be Admission Status: observation status to the service of Dr Richi Pozo   Follow-up Information    None       Current Discharge Medication List      CONTINUE these medications which have NOT CHANGED    Details   aspirin 81 mg chewable tablet Chew 81 mg daily      clopidogrel (PLAVIX) 75 mg tablet Take 75 mg by mouth daily      metoprolol tartrate (LOPRESSOR) 25 mg tablet Take 25 mg by mouth 2 (two) times a day      pantoprazole (PROTONIX) 40 mg tablet Take by mouth      rosuvastatin (CRESTOR) 10 MG tablet Take 10 mg by mouth daily           No discharge procedures on file      ED Provider  Electronically Signed by       Trixie Abel MD  10/15/17 9965

## 2017-10-15 NOTE — CASE MANAGEMENT
Initial Clinical Review    Admission: Date/Time/Statement: Observation 10/15 @ 0212    Orders Placed This Encounter   Procedures    Place in Observation (expected length of stay for this patient is less than two midnights)     Standing Status:   Standing     Number of Occurrences:   1     Order Specific Question:   Admitting Physician     Answer:   Dayami Jackson [[id number]]     Order Specific Question:   Level of Care     Answer:   Med Surg [16]     Order Specific Question:   Bed request comments     Answer:   tele     ED: Date/Time/Mode of Arrival:   ED Arrival Information     Expected Arrival [address] of Arrival Escorted By Service Admission Type    - 10/14/2017 19:09 Urgent Ambulance [address] Urgent    Arrival Complaint    SOB        Chief Complaint:   Chief Complaint   Patient presents with    Flank Pain     Pt c/o left sided flank pain that started 3 days ago  Pt reports weakness and N/V  History of Illness:  80 y o  female who presents with complaints of abdominal pain and shortness of breath  Pt states that she has had pain for 2 days  Pt denies fever, chills  Pt states that she has a cough and did vomit today  Pt denies diarrhea, constipation  Pt denies chest pain  Physician Exam:  Pulmonary/Chest: She has decreased breath sounds  ED Vital Signs:   ED Triage Vitals [10/14/17 1914]   Temperature Pulse Respirations Blood Pressure SpO2   98 3 °F (36 8 °C) 102 22 141/84 94 %      Temp Source Heart Rate Source Patient Position - Orthostatic VS BP Location FiO2 (%)   Oral Monitor Lying Right arm --      Pain Score       5        Wt Readings from Last 1 Encounters:   10/14/17 62 6 kg (138 lb 0 1 oz)     O2 3L N/C 94%    Vital Signs (abnormal): WNL    Abnormal Labs:   Updated: 10/14/17 2131       WBC 15 18 (H) 4 31 - 10 16 Thousand/uL     Diagnostic Test Results: CTA Chest CT Abd/ Pelvis - 1  No pulmonary emboli demonstrated    2   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  3   Complex right upper renal cyst may be correlated with ultrasound  ED Treatment:   Medication Administration from 10/14/2017 1909 to 10/15/2017 0302       Date/Time Order Dose Route Action     10/14/2017 2011 ondansetron (ZOFRAN) injection 4 mg 4 mg Intravenous Given     10/14/2017 2119 albuterol inhalation solution 2 5 mg 2 5 mg Nebulization Given     10/14/2017 2119 ipratropium (ATROVENT) 0 02 % inhalation solution 0 5 mg 0 5 mg Nebulization Given     10/14/2017 2120 sodium chloride 0 9 % bolus 500 mL 500 mL Intravenous New Bag     10/14/2017 2236 iohexol (OMNIPAQUE) 350 MG/ML injection (MULTI-DOSE) 100 mL 100 mL Intravenous Given     10/14/2017 2317 fentanyl citrate (PF) 100 MCG/2ML 50 mcg 50 mcg Intravenous Given     Past Medical/Surgical History: Active Ambulatory Problems     Diagnosis Date Noted    No Active Ambulatory Problems     Resolved Ambulatory Problems     Diagnosis Date Noted    No Resolved Ambulatory Problems     Past Medical History:   Diagnosis Date    Cardiac disease     [facility]) [facility] )     Hypertension     MI (myocardial infarction)     Stroke ([facility] )      Admitting Diagnosis: Weakness [R53 1]  Flank pain [R10 9]  COPD exacerbation (HCC) [J44 1]    Age/Sex: 80 y o  female    Assessment/Plan:   Hospital Problem List:      Principal Problem:    COPD exacerbation ([facility] )  Active Problems:    HTN (hypertension)    CAD (coronary artery disease)      Plan for the Primary Problem(s):  · COPD exacerbation  ? Admit  ?  IV solumedrol 40 mg BID  ? nebulizers     Plan for Additional Problems:   · HTN - continue metoprolol  · CAD - continue plavix, beta blocker, statin     VTE Prophylaxis: Enoxaparin (Lovenox)  / sequential compression device   Code Status: DNR/DNI      Admission Orders:  Tele monitoring  Sputum culture and Gram stain    Scheduled Meds:   aspirin 81 mg Oral Daily   azithromycin 500 mg Oral Daily   clopidogrel 75 mg Oral Daily   docusate sodium 100 mg Oral BID   enoxaparin 30 mg Subcutaneous Daily   methylPREDNISolone sodium succinate 40 mg Intravenous Q12H GABRIELA   metoprolol tartrate 25 mg Oral BID   pravastatin 80 mg Oral Daily With Dinner     Continuous Infusions:    PRN Meds:    calcium carbonate    morphine injection Iv x1

## 2017-10-15 NOTE — ED NOTES
Patient transported to [address] [place] [address],[address] [facility], BETO  10/14/17 1132

## 2017-10-15 NOTE — H&P
History and Physical - [facility] Internal Medicine    Patient Information: Emerson Mittal 80 y o  female MRN: [id number]  Unit/Bed#: ED 13 Encounter: [id number]  Admitting Physician: Chelo Griggs PA-C  PCP: Tree Anand DO  Date of Admission:  10/15/17    Assessment/Plan:    Hospital Problem List:     Principal Problem:    COPD exacerbation ([place] [address] )  Active Problems:    HTN (hypertension)    CAD (coronary artery disease)      Plan for the Primary Problem(s):  · COPD exacerbation  · Admit  · IV solumedrol 40 mg BID  · nebulizers    Plan for Additional Problems:   · HTN - continue metoprolol  · CAD - continue plavix, beta blocker, statin    VTE Prophylaxis: Enoxaparin (Lovenox)  / sequential compression device   Code Status: DNR/DNI  POLST: There is no POLST form on file for this patient (pre-hospital)    Anticipated Length of Stay:  Patient will be admitted on an Observation basis with an anticipated length of stay of  Less than 2 midnights  Justification for Hospital Stay: respiratory support    Total Time for Visit, including Counseling / Coordination of Care: 30 minutes  Greater than 50% of this total time spent on direct patient counseling and coordination of care  Chief Complaint:   Shortness of breath and abdominal pain    History of Present Illness:    Emerson Mittal is a 80 y o  female who presents with complaints of abdominal pain and shortness of breath  Pt states that she has had pain for 2 days  Pt denies fever, chills  Pt states that she has a cough and did vomit today  Pt denies diarrhea, constipation  Pt denies chest pain  Review of Systems:    Review of Systems   Gastrointestinal: Positive for abdominal pain  All other systems reviewed and are negative        Past Medical and Surgical History:     Past Medical History:   Diagnosis Date    Cardiac disease     COPD (chronic obstructive pulmonary disease) ([place] [address] )     Hypertension     MI (myocardial infarction)     Stroke ([place] [address] ) Past Surgical History:   Procedure Laterality Date    APPENDECTOMY      BREAST SURGERY      CHOLECYSTECTOMY      ESOPHAGUS FOREIGN BODY REMOVAL N/A 6/13/2017    Procedure: REMOVAL FOREIGN BODY ESOPHAGUS;  Surgeon: Zeynep Aviles MD;  Location: [facility];  Service: Gastroenterology       Meds/Allergies:    Prior to Admission medications    Medication Sig Start Date End Date Taking? Authorizing Provider   aspirin 81 mg chewable tablet Chew 81 mg daily   Yes Historical Provider, MD   clopidogrel (PLAVIX) 75 mg tablet Take 75 mg by mouth daily   Yes Historical Provider, MD   metoprolol tartrate (LOPRESSOR) 25 mg tablet Take 25 mg by mouth 2 (two) times a day   Yes Historical Provider, MD   pantoprazole (PROTONIX) 40 mg tablet Take by mouth 6/16/17  Yes Historical Provider, MD   rosuvastatin (CRESTOR) 10 MG tablet Take 10 mg by mouth daily   Yes Historical Provider, MD     I have reviewed home medications with patient personally  Allergies: Allergies   Allergen Reactions    Amoxicillin Other (See Comments)     unsure    Penicillins Other (See Comments)     unsure       Social History:     Marital Status:    Occupation: retired  Patient Pre-hospital Living Situation: lives at home  Patient Pre-hospital Level of Mobility: ambulatory  Patient Pre-hospital Diet Restrictions: none  Substance Use History:   History   Alcohol Use No     History   Smoking Status    Former Smoker   Smokeless Tobacco    Former User     History   Drug Use No       Family History:    History reviewed  No pertinent family history      Physical Exam:     Vitals:   Blood Pressure: 136/60 (10/15/17 0139)  Pulse: 89 (10/15/17 0139)  Temperature: 98 3 °F (36 8 °C) (10/14/17 1914)  Temp Source: Oral (10/14/17 1914)  Respirations: 20 (10/15/17 0139)  Height: 5' 2" (157 5 cm) (10/14/17 1914)  Weight - Scale: 62 6 kg (138 lb 0 1 oz) (10/14/17 1914)  SpO2: 100 % (10/15/17 0139)    Physical Exam   Constitutional: She is oriented to person, place, and time  She appears well-developed and well-nourished  HENT:   Head: Normocephalic and atraumatic  Right Ear: External ear normal    Left Ear: External ear normal    Nose: Nose normal    Mouth/Throat: Oropharynx is clear and moist    Eyes: Conjunctivae and EOM are normal  Pupils are equal, round, and reactive to light  Neck: Normal range of motion  Cardiovascular: Normal rate, regular rhythm and normal heart sounds  Pulmonary/Chest: She has decreased breath sounds  Abdominal: Soft  Bowel sounds are normal  She exhibits no distension  There is no tenderness  Musculoskeletal: Normal range of motion  Neurological: She is alert and oriented to person, place, and time  Skin: Skin is warm and dry  Psychiatric: She has a normal mood and affect  Her behavior is normal  Judgment and thought content normal    Vitals reviewed  Additional Data:     Lab Results: I have personally reviewed pertinent reports  Results from last 7 days  Lab Units 10/14/17  2009   WBC Thousand/uL 15 18*   HEMOGLOBIN g/dL 12 6   HEMATOCRIT % 39 1   PLATELETS Thousands/uL 295   LYMPHO PCT % 6*   MONO PCT MAN % 2*   EOSINO PCT MANUAL % 0       Results from last 7 days  Lab Units 10/14/17  2009   SODIUM mmol/L 137   POTASSIUM mmol/L 4 0   CHLORIDE mmol/L 100   CO2 mmol/L 31   BUN mg/dL 18   CREATININE mg/dL 0 83   CALCIUM mg/dL 9 0   TOTAL PROTEIN g/dL 7 8   BILIRUBIN TOTAL mg/dL 0 50   ALK PHOS U/L 80   ALT U/L 12   AST U/L 7   GLUCOSE RANDOM mg/dL 137           Imaging: I have personally reviewed pertinent reports  CTA chest, abdomen, pelvis - [facility] read - 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

## 2017-10-16 LAB
ALBUMIN SERPL BCP-MCNC: 2.9 G/DL (ref 3.5–5)
ALP SERPL-CCNC: 71 U/L (ref 46–116)
ALT SERPL W P-5'-P-CCNC: 9 U/L (ref 12–78)
ANION GAP SERPL CALCULATED.3IONS-SCNC: 3 MMOL/L (ref 4–13)
AST SERPL W P-5'-P-CCNC: 13 U/L (ref 5–45)
BILIRUB SERPL-MCNC: 0.2 MG/DL (ref 0.2–1)
BUN SERPL-MCNC: 20 MG/DL (ref 5–25)
CALCIUM SERPL-MCNC: 9.4 MG/DL (ref 8.3–10.1)
CHLORIDE SERPL-SCNC: 101 MMOL/L (ref 100–108)
CO2 SERPL-SCNC: 32 MMOL/L (ref 21–32)
CREAT SERPL-MCNC: 0.79 MG/DL (ref 0.6–1.3)
ERYTHROCYTE [DISTWIDTH] IN BLOOD BY AUTOMATED COUNT: 12.6 % (ref 11.6–15.1)
GFR SERPL CREATININE-BSD FRML MDRD: 68 ML/MIN/1.73SQ M
GLUCOSE SERPL-MCNC: 196 MG/DL (ref 65–140)
HCT VFR BLD AUTO: 36.6 % (ref 34.8–46.1)
HGB BLD-MCNC: 11.4 G/DL (ref 11.5–15.4)
MCH RBC QN AUTO: 30.6 PG (ref 26.8–34.3)
MCHC RBC AUTO-ENTMCNC: 31.1 G/DL (ref 31.4–37.4)
MCV RBC AUTO: 98 FL (ref 82–98)
PLATELET # BLD AUTO: 267 THOUSANDS/UL (ref 149–390)
PMV BLD AUTO: 8.9 FL (ref 8.9–12.7)
POTASSIUM SERPL-SCNC: 4.7 MMOL/L (ref 3.5–5.3)
PROT SERPL-MCNC: 7.4 G/DL (ref 6.4–8.2)
RBC # BLD AUTO: 3.73 MILLION/UL (ref 3.81–5.12)
SODIUM SERPL-SCNC: 136 MMOL/L (ref 136–145)
WBC # BLD AUTO: 11.94 THOUSAND/UL (ref 4.31–10.16)

## 2017-10-16 PROCEDURE — 87798 DETECT AGENT NOS DNA AMP: CPT | Performed by: PHYSICIAN ASSISTANT

## 2017-10-16 PROCEDURE — 87633 RESP VIRUS 12-25 TARGETS: CPT | Performed by: PHYSICIAN ASSISTANT

## 2017-10-16 PROCEDURE — 85027 COMPLETE CBC AUTOMATED: CPT | Performed by: INTERNAL MEDICINE

## 2017-10-16 PROCEDURE — 94760 N-INVAS EAR/PLS OXIMETRY 1: CPT

## 2017-10-16 PROCEDURE — 80053 COMPREHEN METABOLIC PANEL: CPT | Performed by: INTERNAL MEDICINE

## 2017-10-16 PROCEDURE — 94640 AIRWAY INHALATION TREATMENT: CPT

## 2017-10-16 RX ORDER — METHYLPREDNISOLONE SODIUM SUCCINATE 40 MG/ML
40 INJECTION, POWDER, LYOPHILIZED, FOR SOLUTION INTRAMUSCULAR; INTRAVENOUS EVERY 12 HOURS SCHEDULED
Status: DISCONTINUED | OUTPATIENT
Start: 2017-10-16 | End: 2017-10-17

## 2017-10-16 RX ORDER — GUAIFENESIN 600 MG
600 TABLET, EXTENDED RELEASE 12 HR ORAL EVERY 12 HOURS SCHEDULED
Status: DISCONTINUED | OUTPATIENT
Start: 2017-10-16 | End: 2017-10-21 | Stop reason: HOSPADM

## 2017-10-16 RX ORDER — METHYLPREDNISOLONE SODIUM SUCCINATE 40 MG/ML
40 INJECTION, POWDER, LYOPHILIZED, FOR SOLUTION INTRAMUSCULAR; INTRAVENOUS EVERY 8 HOURS SCHEDULED
Status: DISCONTINUED | OUTPATIENT
Start: 2017-10-16 | End: 2017-10-16

## 2017-10-16 RX ORDER — METHYLPREDNISOLONE SODIUM SUCCINATE 40 MG/ML
40 INJECTION, POWDER, LYOPHILIZED, FOR SOLUTION INTRAMUSCULAR; INTRAVENOUS EVERY 12 HOURS SCHEDULED
Status: DISCONTINUED | OUTPATIENT
Start: 2017-10-16 | End: 2017-10-16

## 2017-10-16 RX ORDER — AZITHROMYCIN 250 MG/1
250 TABLET, FILM COATED ORAL DAILY
Status: COMPLETED | OUTPATIENT
Start: 2017-10-17 | End: 2017-10-19

## 2017-10-16 RX ORDER — DILTIAZEM HYDROCHLORIDE 5 MG/ML
10 INJECTION INTRAVENOUS ONCE
Status: COMPLETED | OUTPATIENT
Start: 2017-10-16 | End: 2017-10-16

## 2017-10-16 RX ADMIN — PRAVASTATIN SODIUM 80 MG: 80 TABLET ORAL at 17:00

## 2017-10-16 RX ADMIN — AZITHROMYCIN 500 MG: 250 TABLET, FILM COATED ORAL at 05:32

## 2017-10-16 RX ADMIN — IPRATROPIUM BROMIDE AND ALBUTEROL SULFATE 3 ML: .5; 3 SOLUTION RESPIRATORY (INHALATION) at 20:06

## 2017-10-16 RX ADMIN — METHYLPREDNISOLONE SODIUM SUCCINATE 40 MG: 40 INJECTION, POWDER, FOR SOLUTION INTRAMUSCULAR; INTRAVENOUS at 14:57

## 2017-10-16 RX ADMIN — DOCUSATE SODIUM 100 MG: 100 CAPSULE, LIQUID FILLED ORAL at 17:59

## 2017-10-16 RX ADMIN — METOPROLOL TARTRATE 25 MG: 25 TABLET ORAL at 09:27

## 2017-10-16 RX ADMIN — CLOPIDOGREL BISULFATE 75 MG: 75 TABLET ORAL at 09:28

## 2017-10-16 RX ADMIN — IPRATROPIUM BROMIDE AND ALBUTEROL SULFATE 3 ML: .5; 3 SOLUTION RESPIRATORY (INHALATION) at 07:43

## 2017-10-16 RX ADMIN — METHYLPREDNISOLONE SODIUM SUCCINATE 40 MG: 40 INJECTION, POWDER, FOR SOLUTION INTRAMUSCULAR; INTRAVENOUS at 20:42

## 2017-10-16 RX ADMIN — DILTIAZEM HYDROCHLORIDE 10 MG: 5 INJECTION INTRAVENOUS at 10:03

## 2017-10-16 RX ADMIN — GUAIFENESIN 600 MG: 600 TABLET, EXTENDED RELEASE ORAL at 20:41

## 2017-10-16 RX ADMIN — ASPIRIN 81 MG 81 MG: 81 TABLET ORAL at 09:28

## 2017-10-16 RX ADMIN — METHYLPREDNISOLONE SODIUM SUCCINATE 40 MG: 40 INJECTION, POWDER, FOR SOLUTION INTRAMUSCULAR; INTRAVENOUS at 05:33

## 2017-10-16 RX ADMIN — BENZONATATE 100 MG: 100 CAPSULE ORAL at 06:23

## 2017-10-16 RX ADMIN — IPRATROPIUM BROMIDE AND ALBUTEROL SULFATE 3 ML: .5; 3 SOLUTION RESPIRATORY (INHALATION) at 13:00

## 2017-10-16 RX ADMIN — METOPROLOL TARTRATE 25 MG: 25 TABLET ORAL at 18:00

## 2017-10-16 RX ADMIN — ENOXAPARIN SODIUM 30 MG: 30 INJECTION SUBCUTANEOUS at 09:28

## 2017-10-16 RX ADMIN — LIDOCAINE 2 PATCH: 50 PATCH CUTANEOUS at 09:28

## 2017-10-16 RX ADMIN — GUAIFENESIN 600 MG: 600 TABLET, EXTENDED RELEASE ORAL at 13:01

## 2017-10-16 NOTE — PLAN OF CARE
Problem: DISCHARGE PLANNING - CARE MANAGEMENT  Goal: Discharge to post-acute care or home with appropriate resources  INTERVENTIONS:  - Conduct assessment to determine patient/family and health care team treatment goals, and need for post-acute services based on payer coverage, community resources, and patient preferences, and barriers to discharge  - Address psychosocial, clinical, and financial barriers to discharge as identified in assessment in conjunction with the patient/family and health care team  - Arrange appropriate level of post-acute services according to patient's   needs and preference and payer coverage in collaboration with the physician and health care team  - Communicate with and update the patient/family, physician, and health care team regarding progress on the discharge plan  - Arrange appropriate transportation to post-acute venues  Outcome: Progressing  Patient is legally blind and requires minimal assistance with ADLs  Patient lives with her daughter and son in law  Patient is on constant oxygen    CM to follow up with future needs

## 2017-10-16 NOTE — CASE MANAGEMENT
[facility] in the [facility] by Isaac Dorado for 2017  Network Utilization Review Department  Phone: [phone]; Fax [phone]  ATTENTION: The Network Utilization Review Department is now centralized for our 7 Facilities  Make a note that we have a new phone and fax numbers for our Department  Please call with any questions or concerns to [phone] and carefully follow the prompts so that you are directed to the right person  All voicemails are confidential  Fax any determinations, approvals, denials, and requests for initial or continue stay review clinical to [phone]  Due to HIGH CALL volume, it would be easier if you could please send faxed requests to expedite your requests and in part, help us provide discharge notifications faster  SEE INITIAL REVIEW BELOW COMPLETED BY DERRICK VANCE  ON 10/15/17    WAS OBS 10/15/17 @0211  CHANGED TO INPATIENT 10/15/17 @1519  10/15/17 1520  Inpatient Admission Once     Transfer Service: General Medicine       Question Answer Comment   Admitting Physician Nathalie Shah    Level of Care Med Surg    Estimated length of stay More than 2 Midnights    Certification I certify that inpatient services are medically necessary for this patient for a duration of greater than two midnights  See H&P and MD Progress Notes for additional information about the patient's course of treatment          10/15/17 1519         Continued Stay Review    Date: 10/16/17      INPATIENT    Vital Signs: /72   Pulse 82   Temp 98 2 °F (36 8 °C) (Oral)   Resp 18   Ht 5' 2" (1 575 m)   Wt 62 6 kg (138 lb 0 1 oz)   LMP  (LMP Unknown)   SpO2 90%   BMI 25 24 kg/m²    10/15/17 2300  98 °F (36 7 °C)  69  20   98/49       Medications:   Scheduled Meds:   aspirin 81 mg Oral Daily   [START ON 10/17/2017] azithromycin 250 mg Oral Daily   clopidogrel 75 mg Oral Daily   docusate sodium 100 mg Oral BID   enoxaparin 30 mg Subcutaneous Daily guaiFENesin 600 mg Oral Q12H [address]   ipratropium-albuterol 3 mL Nebulization TID   lidocaine 2 patch Transdermal Daily   methylPREDNISolone sodium succinate 40 mg Intravenous Q8H [address]   metoprolol tartrate 25 mg Oral BID   pravastatin 80 mg Oral Daily With Dinner     IV cardizem x 1 on 10/16    PRN Meds:   albuterol **AND** sodium chloride    benzonatate    calcium carbonate    HYDROcodone-acetaminophen    morphine injection    Abnormal Labs/Diagnostic Results:   10/16: a gap 3   Gluc 196   Alb 2 9   Alt 9   Wbc 11 94   hgb 11 4    trops wnl    Age/Sex: 80 y o  female     Assessment/Plan:   PER MED 10/16:  · COPD exacerbation  The patient is an IV steroids  This was increased yesterday to every 8 hours  We will have pulmonology evaluate the patient  Continue azithromycin  · Tachycardia: This could be secondary to the steroids  The patient just moved as well  I will try some Cardizem along with the patient's beta-blocker this morning  If it persists then we may need to consider cardiology evaluation but we will try with some IV Cardizem for now to see if little improve the tachycardia  · History of coronary disease:  Continue home medications  · Hypertension: This is stable  Patient is on beta-blocker    Certification Statement: The patient will continue to require additional inpatient hospital stay due to Still having shortness of breath needing IV steroids and also been tachycardic in the 130s to 140s     PER PULM 10/16:  Assessment:  1  COPD exacerbation  2  Acute tracheobronchitis     Plan:   1  COPD exacerbation due to acute tracheobronchitis  - CTA done shows no PE, no infiltrates   On prior CT in 2015 she had 2 tiny nodules in right lung not seen on this exam  - Continue azithromycin course for tracheobronchitis, will also send flu and viral panel given the nausea/vomiting - possibly viral in nature  - She is on her baseline 3L oxygen with sats in mid-high 90s  - Continue with ATC nebs, she is not on any long acting inhalers at home  - Continue with solu-medrol, will decrease to q12 h today  - Will add mucinex given cough and nasal discharge  - She has not seen us in the office on over 1 year, will need follow up upon discharge    =====================================================

## 2017-10-16 NOTE — SOCIAL WORK
Patient is an 80 y o   female who is   Patient states she lives with her daughter and son in law in a ramped one level home  Patient has mininal assistance with her ADLs  Patient visit her PCP regularly and [facility]  Patient states her daugher is a nurse and she will need to consult with her dtr to have VNA services because patient states her daughter is a nurse and cares for her  Patient is legally blind and is on Oxygen three liters  Her Oxygen is delivered from [address]  services  Patient requested information to have her daughter be paid for caring for her  CM to provided resources to [place] [facility] phone number and information  CM to follow up as needed

## 2017-10-16 NOTE — PROGRESS NOTES
[facility] Progress Note  Patient: Chris Grijalva 80 y o  female   MRN: [id number]  PCP: Xin Vigil DO  Unit/Bed#: -[facility] Encounter: [id number]  Date Of Visit: 10/16/17    Assessment:    Principal Problem:    COPD exacerbation ([address] )  Active Problems:    HTN (hypertension)    CAD (coronary artery disease)      Plan:    · COPD exacerbation  The patient is an IV steroids  This was increased yesterday to every 8 hours  We will have pulmonology evaluate the patient  Continue azithromycin  · Tachycardia: This could be secondary to the steroids  The patient just moved as well  I will try some Cardizem along with the patient's beta-blocker this morning  If it persists then we may need to consider cardiology evaluation but we will try with some IV Cardizem for now to see if little improve the tachycardia  · History of coronary disease:  Continue home medications  · Hypertension: This is stable  Patient is on beta-blocker      VTE Pharmacologic Prophylaxis:   Pharmacologic: Enoxaparin (Lovenox)  Mechanical VTE Prophylaxis in Place: Yes    Patient Centered Rounds: I have performed bedside rounds with nursing staff today  Education and Discussions with Family / Patient:  Patient    Time Spent for Care: 20 minutes  More than 50% of total time spent on counseling and coordination of care as described above  Current Length of Stay: 1 day(s)    Current Patient Status: Inpatient   Certification Statement: The patient will continue to require additional inpatient hospital stay due to Still having shortness of breath needing IV steroids and also been tachycardic in the 130s to 140s    Discharge Plan:  Anticipate patient will be here for least 48 hours    Code Status: Level 3 - DNAR and DNI      Subjective:   Patient seen examined  She feels short of breath  States she woke up this morning feeling okay but then became nauseous    Her heart rate has been in the 120s to 130s    Objective: Vitals:   Temp (24hrs), Av 8 °F (36 6 °C), Min:97 5 °F (36 4 °C), Max:98 1 °F (36 7 °C)    HR:  [62-74] 70  Resp:  [18-20] 18  BP: ()/(49-63) 150/63  SpO2:  [93 %-98 %] 95 %  Body mass index is 25 24 kg/m²  Input and Output Summary (last 24 hours): Intake/Output Summary (Last 24 hours) at 10/16/17 0952  Last data filed at 10/16/17 06   Gross per 24 hour   Intake              240 ml   Output              400 ml   Net             -160 ml       Physical Exam:     General Appearance:    Alert, cooperative, no distress, appears stated age                               Lungs:     Poor inspiratory effort with expiratory wheezes       Heart:    Tachycardic but regular, S1 and S2 normal, no murmur, rub    or gallop   Abdomen:     Soft, non-tender, bowel sounds active all four quadrants,     no masses, no organomegaly           Extremities:   Extremities normal, atraumatic, no cyanosis or edema                       Additional Data:     Labs:      Results from last 7 days  Lab Units 10/16/17  0450  10/14/17  2009   WBC Thousand/uL 11 94*  < > 15 18*   HEMOGLOBIN g/dL 11 4*  < > 12 6   HEMATOCRIT % 36 6  < > 39 1   PLATELETS Thousands/uL 267  < > 295   LYMPHO PCT %  --   --  6*   MONO PCT MAN %  --   --  2*   EOSINO PCT MANUAL %  --   --  0   < > = values in this interval not displayed  Results from last 7 days  Lab Units 10/16/17  0450   SODIUM mmol/L 136   POTASSIUM mmol/L 4 7   CHLORIDE mmol/L 101   CO2 mmol/L 32   BUN mg/dL 20   CREATININE mg/dL 0 79   CALCIUM mg/dL 9 4   TOTAL PROTEIN g/dL 7 4   BILIRUBIN TOTAL mg/dL 0 20   ALK PHOS U/L 71   ALT U/L 9*   AST U/L 13   GLUCOSE RANDOM mg/dL 196*           * I Have Reviewed All Lab Data Listed Above  * Additional Pertinent Lab Tests Reviewed:  All [place] Admission Reviewed        Recent Cultures (last 7 days):           Last 24 Hours Medication List:     aspirin 81 mg Oral Daily   azithromycin 500 mg Oral Daily   clopidogrel 75 mg Oral Daily   diltiazem 10 mg Intravenous Once   docusate sodium 100 mg Oral BID   enoxaparin 30 mg Subcutaneous Daily   ipratropium-albuterol 3 mL Nebulization TID   lidocaine 2 patch Transdermal Daily   methylPREDNISolone sodium succinate 40 mg Intravenous Q8H [facility]   metoprolol tartrate 25 mg Oral BID   pravastatin 80 mg Oral Daily With Dinner        Today, Patient Was Seen By: Igor Brownlee MD    ** Please Note: Dragon 360 Dictation voice to text software may have been used in the creation of this document   **

## 2017-10-16 NOTE — CONSULTS
Consultation - Pulmonary Medicine   Tray Amin 80 y o  female MRN: [id number]  Unit/Bed#: -01 Encounter: [id number]      Assessment:  1  COPD exacerbation  2  Acute tracheobronchitis    Plan:   1  COPD exacerbation due to acute tracheobronchitis  - CTA done shows no PE, no infiltrates  On prior CT in 2015 she had 2 tiny nodules in right lung not seen on this exam  - Continue azithromycin course for tracheobronchitis, will also send flu and viral panel given the nausea/vomiting - possibly viral in nature  - She is on her baseline 3L oxygen with sats in mid-high 90s  - Continue with ATC nebs, she is not on any long acting inhalers at home  - Continue with solu-medrol, will decrease to q12 h today  - Will add mucinex given cough and nasal discharge  - She has not seen us in the office on over 1 year, will need follow up upon discharge    History of Present Illness   Physician Requesting Consult: Franco Swann MD  Reason for Consult / Principal Problem: COPD exacerbation  Hx and PE limited by: None  HPI: Tray Amin is a 80y o  year old female former smoker who quit in 2015 with moderate-severe COPD on home O2 and ATC nebs, CAD, HTN, h/o stroke who presents with complaint of cough, left sided flank pain and nausea/vomiting for about 3 days  She states cough is productive of clear mucous, also had a runny nose  No fever or chills, no sick contacts  The left sided flank pain is the reason she came to the hospital      She is feeling slightly better, pain is better with the lidoderm patch  No shortness of breath, still does have a cough and runny nose  Inpatient consult to Pulmonology  Consult performed by: Annie Canchola ordered by: Norma Birch          Review of Systems   Constitutional: Positive for fatigue  Negative for fever  HENT: Positive for congestion and rhinorrhea  Respiratory: Positive for cough  Negative for shortness of breath  Cardiovascular: Negative      Genitourinary: Negative  Neurological: Negative  Historical Information   Past Medical History:   Diagnosis Date    Cardiac disease     [facility]     Hypertension     MI (myocardial infarction)     Stroke ([facility] )      Past Surgical History:   Procedure Laterality Date    APPENDECTOMY      BREAST SURGERY      CHOLECYSTECTOMY      ESOPHAGUS FOREIGN BODY REMOVAL N/A 6/13/2017    Procedure: REMOVAL FOREIGN BODY ESOPHAGUS;  Surgeon: She Hogue MD;  Location: [facility];  Service: Gastroenterology     Social History   History   Alcohol Use No     History   Drug Use No     History   Smoking Status    Former Smoker   Smokeless Tobacco    Former User     Occupational History: noncontributory    Family History: non-contributory    Meds/Allergies   all current active meds have been reviewed and pertinent pulmonary meds have been reviewed    Allergies   Allergen Reactions    Amoxicillin Other (See Comments)     unsure    Penicillins Other (See Comments)     unsure       Objective   Vitals: Blood pressure 153/72, pulse 82, temperature 98 2 °F (36 8 °C), temperature source Oral, resp  rate 18, height 5' 2" (1 575 m), weight 62 6 kg (138 lb 0 1 oz), SpO2 96 %  ,Body mass index is 25 24 kg/m²  Intake/Output Summary (Last 24 hours) at 10/16/17 1205  Last data filed at 10/16/17 0601   Gross per 24 hour   Intake              240 ml   Output              400 ml   Net             -160 ml     Invasive Devices     Peripheral Intravenous Line            Peripheral IV 10/14/17 Right Antecubital 1 day                Physical Exam   Constitutional: She is oriented to person, place, and time  She appears well-developed and well-nourished  No distress  Cardiovascular: Normal rate and regular rhythm  Pulmonary/Chest: Effort normal  No accessory muscle usage  No respiratory distress  Diminished air entry, faint expiratory wheeze bilaterally   Musculoskeletal: She exhibits no edema  Neurological: She is alert and oriented to person, place, and time  Lab Results:   I have personally reviewed pertinent lab results  , CBC:   Lab Results   Component Value Date    WBC 11 94 (H) 10/16/2017    HGB 11 4 (L) 10/16/2017    HCT 36 6 10/16/2017    MCV 98 10/16/2017     10/16/2017    MCH 30 6 10/16/2017    MCHC 31 1 (L) 10/16/2017    RDW 12 6 10/16/2017    MPV 8 9 10/16/2017   , CMP:   Lab Results   Component Value Date     10/16/2017    K 4 7 10/16/2017     10/16/2017    CO2 32 10/16/2017    ANIONGAP 3 (L) 10/16/2017    BUN 20 10/16/2017    CREATININE 0 79 10/16/2017    GLUCOSE 196 (H) 10/16/2017    CALCIUM 9 4 10/16/2017    AST 13 10/16/2017    ALT 9 (L) 10/16/2017    ALKPHOS 71 10/16/2017    PROT 7 4 10/16/2017    ALBUMIN 2 9 (L) 10/16/2017    BILITOT 0 20 10/16/2017    EGFR 68 10/16/2017     Imaging Studies: I have personally reviewed pertinent reports  and I have personally reviewed pertinent films in PACS  EKG, Pathology, and Other Studies: I have personally reviewed pertinent reports      VTE Prophylaxis: Sequential compression device (Venodyne)  and Enoxaparin (Lovenox)    Code Status: Level 3 - DNAR and DNI  Advance Directive and Living Will:      Power of :    POLST:

## 2017-10-17 LAB
FLUAV AG SPEC QL: NORMAL
FLUBV AG SPEC QL: NORMAL
RSV B RNA SPEC QL NAA+PROBE: NORMAL

## 2017-10-17 PROCEDURE — 94640 AIRWAY INHALATION TREATMENT: CPT

## 2017-10-17 PROCEDURE — 94760 N-INVAS EAR/PLS OXIMETRY 1: CPT

## 2017-10-17 RX ORDER — PREDNISONE 20 MG/1
40 TABLET ORAL DAILY
Status: DISCONTINUED | OUTPATIENT
Start: 2017-10-18 | End: 2017-10-18

## 2017-10-17 RX ORDER — LANOLIN ALCOHOL/MO/W.PET/CERES
3 CREAM (GRAM) TOPICAL
Status: DISCONTINUED | OUTPATIENT
Start: 2017-10-17 | End: 2017-10-21 | Stop reason: HOSPADM

## 2017-10-17 RX ADMIN — BENZONATATE 100 MG: 100 CAPSULE ORAL at 21:55

## 2017-10-17 RX ADMIN — METHYLPREDNISOLONE SODIUM SUCCINATE 40 MG: 40 INJECTION, POWDER, FOR SOLUTION INTRAMUSCULAR; INTRAVENOUS at 09:06

## 2017-10-17 RX ADMIN — PRAVASTATIN SODIUM 80 MG: 80 TABLET ORAL at 18:29

## 2017-10-17 RX ADMIN — IPRATROPIUM BROMIDE AND ALBUTEROL SULFATE 3 ML: .5; 3 SOLUTION RESPIRATORY (INHALATION) at 07:56

## 2017-10-17 RX ADMIN — IPRATROPIUM BROMIDE AND ALBUTEROL SULFATE 3 ML: .5; 3 SOLUTION RESPIRATORY (INHALATION) at 13:39

## 2017-10-17 RX ADMIN — LIDOCAINE 2 PATCH: 50 PATCH CUTANEOUS at 09:06

## 2017-10-17 RX ADMIN — DOCUSATE SODIUM 100 MG: 100 CAPSULE, LIQUID FILLED ORAL at 18:29

## 2017-10-17 RX ADMIN — DOCUSATE SODIUM 100 MG: 100 CAPSULE, LIQUID FILLED ORAL at 09:05

## 2017-10-17 RX ADMIN — METOPROLOL TARTRATE 25 MG: 25 TABLET ORAL at 09:05

## 2017-10-17 RX ADMIN — METOPROLOL TARTRATE 25 MG: 25 TABLET ORAL at 18:29

## 2017-10-17 RX ADMIN — IPRATROPIUM BROMIDE AND ALBUTEROL SULFATE 3 ML: .5; 3 SOLUTION RESPIRATORY (INHALATION) at 20:22

## 2017-10-17 RX ADMIN — CLOPIDOGREL BISULFATE 75 MG: 75 TABLET ORAL at 09:06

## 2017-10-17 RX ADMIN — ENOXAPARIN SODIUM 30 MG: 30 INJECTION SUBCUTANEOUS at 09:06

## 2017-10-17 RX ADMIN — ASPIRIN 81 MG 81 MG: 81 TABLET ORAL at 09:06

## 2017-10-17 RX ADMIN — GUAIFENESIN 600 MG: 600 TABLET, EXTENDED RELEASE ORAL at 21:55

## 2017-10-17 RX ADMIN — GUAIFENESIN 600 MG: 600 TABLET, EXTENDED RELEASE ORAL at 09:05

## 2017-10-17 RX ADMIN — AZITHROMYCIN 250 MG: 250 TABLET, FILM COATED ORAL at 07:36

## 2017-10-17 NOTE — PLAN OF CARE
DISCHARGE PLANNING     Discharge to home or other facility with appropriate resources Progressing        DISCHARGE PLANNING - CARE MANAGEMENT     Discharge to post-acute care or home with appropriate resources Progressing        INFECTION - ADULT     Absence or prevention of progression during hospitalization Progressing     Absence of fever/infection during neutropenic period Progressing        Knowledge Deficit     Patient/family/caregiver demonstrates understanding of disease process, treatment plan, medications, and discharge instructions Progressing        PAIN - ADULT     Verbalizes/displays adequate comfort level or baseline comfort level Progressing        Potential for Falls     Patient will remain free of falls Progressing        SAFETY ADULT     Maintain or return to baseline ADL function Progressing     Maintain or return mobility status to optimal level Progressing     Patient will remain free of falls Progressing

## 2017-10-18 LAB — GLUCOSE SERPL-MCNC: 118 MG/DL (ref 65–140)

## 2017-10-18 PROCEDURE — 82948 REAGENT STRIP/BLOOD GLUCOSE: CPT

## 2017-10-18 PROCEDURE — G8978 MOBILITY CURRENT STATUS: HCPCS

## 2017-10-18 PROCEDURE — 94640 AIRWAY INHALATION TREATMENT: CPT

## 2017-10-18 PROCEDURE — 94760 N-INVAS EAR/PLS OXIMETRY 1: CPT

## 2017-10-18 PROCEDURE — 97163 PT EVAL HIGH COMPLEX 45 MIN: CPT

## 2017-10-18 RX ORDER — PREDNISONE 20 MG/1
40 TABLET ORAL DAILY
Status: DISCONTINUED | OUTPATIENT
Start: 2017-10-18 | End: 2017-10-21 | Stop reason: HOSPADM

## 2017-10-18 RX ORDER — METHYLPREDNISOLONE SODIUM SUCCINATE 40 MG/ML
40 INJECTION, POWDER, LYOPHILIZED, FOR SOLUTION INTRAMUSCULAR; INTRAVENOUS DAILY
Status: DISCONTINUED | OUTPATIENT
Start: 2017-10-18 | End: 2017-10-18

## 2017-10-18 RX ORDER — PANTOPRAZOLE SODIUM 40 MG/1
40 TABLET, DELAYED RELEASE ORAL
Status: DISCONTINUED | OUTPATIENT
Start: 2017-10-18 | End: 2017-10-21 | Stop reason: HOSPADM

## 2017-10-18 RX ORDER — ONDANSETRON 2 MG/ML
4 INJECTION INTRAMUSCULAR; INTRAVENOUS EVERY 4 HOURS PRN
Status: DISCONTINUED | OUTPATIENT
Start: 2017-10-18 | End: 2017-10-21 | Stop reason: HOSPADM

## 2017-10-18 RX ADMIN — AZITHROMYCIN 250 MG: 250 TABLET, FILM COATED ORAL at 05:30

## 2017-10-18 RX ADMIN — GUAIFENESIN 600 MG: 600 TABLET, EXTENDED RELEASE ORAL at 09:34

## 2017-10-18 RX ADMIN — IPRATROPIUM BROMIDE AND ALBUTEROL SULFATE 3 ML: .5; 3 SOLUTION RESPIRATORY (INHALATION) at 14:23

## 2017-10-18 RX ADMIN — IPRATROPIUM BROMIDE AND ALBUTEROL SULFATE 3 ML: .5; 3 SOLUTION RESPIRATORY (INHALATION) at 20:00

## 2017-10-18 RX ADMIN — DOCUSATE SODIUM 100 MG: 100 CAPSULE, LIQUID FILLED ORAL at 18:03

## 2017-10-18 RX ADMIN — PRAVASTATIN SODIUM 80 MG: 80 TABLET ORAL at 18:03

## 2017-10-18 RX ADMIN — ONDANSETRON 4 MG: 2 INJECTION INTRAMUSCULAR; INTRAVENOUS at 11:29

## 2017-10-18 RX ADMIN — LIDOCAINE 2 PATCH: 50 PATCH CUTANEOUS at 09:34

## 2017-10-18 RX ADMIN — ENOXAPARIN SODIUM 30 MG: 30 INJECTION SUBCUTANEOUS at 09:34

## 2017-10-18 RX ADMIN — PANTOPRAZOLE SODIUM 40 MG: 40 TABLET, DELAYED RELEASE ORAL at 12:23

## 2017-10-18 RX ADMIN — CLOPIDOGREL BISULFATE 75 MG: 75 TABLET ORAL at 09:33

## 2017-10-18 RX ADMIN — DOCUSATE SODIUM 100 MG: 100 CAPSULE, LIQUID FILLED ORAL at 09:33

## 2017-10-18 RX ADMIN — GUAIFENESIN 600 MG: 600 TABLET, EXTENDED RELEASE ORAL at 21:06

## 2017-10-18 RX ADMIN — METOPROLOL TARTRATE 25 MG: 25 TABLET ORAL at 09:33

## 2017-10-18 RX ADMIN — METOPROLOL TARTRATE 25 MG: 25 TABLET ORAL at 18:03

## 2017-10-18 RX ADMIN — PREDNISONE 40 MG: 20 TABLET ORAL at 09:41

## 2017-10-18 RX ADMIN — MELATONIN TAB 3 MG 3 MG: 3 TAB at 21:06

## 2017-10-18 RX ADMIN — ASPIRIN 81 MG 81 MG: 81 TABLET ORAL at 09:33

## 2017-10-18 NOTE — PROGRESS NOTES
[facility] Progress Note  Patient: Lynda Arguelles 80 y o  female   MRN: [id number]  PCP: Aiden Edmonds DO  Unit/Bed#: -[facility] Encounter: [id number]  Date Of Visit: 10/18/17    Assessment:    Principal Problem:    COPD exacerbation ([address] )  Active Problems:    HTN (hypertension)    CAD (coronary artery disease)      Plan:    · COPD exacerbation:  Switch to oral antibiotics today  Pulmonology evaluations appreciated  · Tachycardia: This has improved  This was were secondary to the steroids  Continue beta-blocker  · History of coronary disease:  Stable  · Nausea:  Patient is nauseous today  I will add Zofran as needed  Will continue to monitor  · Encourage ambulation get physical therapy evaluation  · AAA:  Slowly 3 cm at this time  Follow up as an outpatient in 1 year      VTE Pharmacologic Prophylaxis:   Pharmacologic: Enoxaparin (Lovenox)  Mechanical VTE Prophylaxis in Place: Yes    Patient Centered Rounds: I have performed bedside rounds with nursing staff today  Discussions with Specialists or Other Care Team Provider:  Nursing    Education and Discussions with Family / Patient:  Patient    Time Spent for Care: 20 minutes  More than 50% of total time spent on counseling and coordination of care as described above  Current Length of Stay: 3 day(s)    Current Patient Status: Inpatient   Certification Statement: The patient will continue to require additional inpatient hospital stay due to Being nauseous and also needing physical therapy evaluation    Discharge Plan:  Anticipate discharge tomorrow    Code Status: Level 3 - DNAR and DNI      Subjective:   Patient seen examined  States she does not feel fine  States she feels weak and nauseous    Objective:     Vitals:   Temp (24hrs), Av °F (36 7 °C), Min:97 7 °F (36 5 °C), Max:98 2 °F (36 8 °C)    HR:  [] 102  Resp:  [16-20] 16  BP: (137-178)/(60-78) 178/72  SpO2:  [93 %-97 %] 95 %  Body mass index is 25 24 kg/m²  Input and Output Summary (last 24 hours): Intake/Output Summary (Last 24 hours) at 10/18/17 1001  Last data filed at 10/17/17 1349   Gross per 24 hour   Intake              360 ml   Output                0 ml   Net              360 ml       Physical Exam:     General Appearance:    Alert, cooperative, no distress, appears stated age                               Lungs:     Clear to auscultation bilaterally, respirations unlabored       Heart:    Regular rate and rhythm, S1 and S2 normal, no murmur, rub    or gallop   Abdomen:     Soft, non-tender, bowel sounds active all four quadrants,     no masses, no organomegaly           Extremities:   Extremities normal, atraumatic, no cyanosis or edema                       Additional Data:     Labs:      Results from last 7 days  Lab Units 10/16/17  0450  10/14/17  2009   WBC Thousand/uL 11 94*  < > 15 18*   HEMOGLOBIN g/dL 11 4*  < > 12 6   HEMATOCRIT % 36 6  < > 39 1   PLATELETS Thousands/uL 267  < > 295   LYMPHO PCT %  --   --  6*   MONO PCT MAN %  --   --  2*   EOSINO PCT MANUAL %  --   --  0   < > = values in this interval not displayed  Results from last 7 days  Lab Units 10/16/17  0450   SODIUM mmol/L 136   POTASSIUM mmol/L 4 7   CHLORIDE mmol/L 101   CO2 mmol/L 32   BUN mg/dL 20   CREATININE mg/dL 0 79   CALCIUM mg/dL 9 4   TOTAL PROTEIN g/dL 7 4   BILIRUBIN TOTAL mg/dL 0 20   ALK PHOS U/L 71   ALT U/L 9*   AST U/L 13   GLUCOSE RANDOM mg/dL 196*           * I Have Reviewed All Lab Data Listed Above  * Additional Pertinent Lab Tests Reviewed:  [facility] Admission Reviewed        Recent Cultures (last 7 days):       Results from last 7 days  Lab Units 10/16/17  1359   INFLUENZA A PCR  None Detected   INFLUENZA B PCR  None Detected   RSV PCR  None Detected       Last 24 Hours Medication List:     aspirin 81 mg Oral Daily   azithromycin 250 mg Oral Daily   clopidogrel 75 mg Oral Daily   docusate sodium 100 mg Oral BID   enoxaparin 30 mg Subcutaneous Daily   guaiFENesin 600 mg Oral Q12H [facility]   ipratropium-albuterol 3 mL Nebulization TID   lidocaine 2 patch Transdermal Daily   melatonin 3 mg Oral HS   metoprolol tartrate 25 mg Oral BID   pravastatin 80 mg Oral Daily With Dinner   predniSONE 40 mg Oral Daily        Today, Patient Was Seen By: Gracie Floyd MD    ** Please Note: Dragon 360 Dictation voice to text software may have been used in the creation of this document   **

## 2017-10-18 NOTE — PLAN OF CARE
Problem: PHYSICAL THERAPY ADULT  Goal: Performs mobility at highest level of function for planned discharge setting  See evaluation for individualized goals  Treatment/Interventions: Functional transfer training, LE strengthening/ROM, Endurance training, Patient/family training, Bed mobility, Gait training, Equipment eval/education, Spoke to nursing, Spoke to case management  Equipment Recommended:  (Possible need for RW  Still assessing  )       See flowsheet documentation for full assessment, interventions and recommendations  Prognosis: Fair  Problem List: Decreased endurance, Impaired balance, Decreased mobility (nausea  )  Assessment: Pt is 80 y o  female seen for PT evaluation s/p admit to [facility] on 10/14/2017 w/ COPD exacerbation ([facility] [address] )  PT consulted to assess pt's functional mobility and d/c needs  Order placed for PT eval and tx, w/ up w/ A order  Comorbidities affecting pt's physical performance at time of assessment include: HTN, CAD, AAA, legally blind, and home O2 use of 3 liters daily  PTA, pt was independent w/ all functional mobility w/ no need for an assistive device for ambulation PTA, has 1 VIRGIL, lives w/ family in 1 level home and retired  Personal factors affecting pt at time of IE include: stairs to enter home, inability to perform IADLs, inability to perform ADLs and very limited mobility activity tolerance due to severe nausea at this time    Please find objective findings from PT assessment regarding body systems outlined above with impairments and limitations including impaired balance, decreased endurance, decreased activity tolerance, decreased functional mobility tolerance, fall risk and SOB upon exertion  The following objective measures performed on IE also reveal limitations: Barthel Index: 70/100   Pt's clinical presentation is currently unstable/unpredictable seen in pt's presentation of requiring additional supplimental O2, SOB and severe nausea with mobility activity performance, legal blindness affecting mobility   Pt to benefit from continued PT tx to address deficits as defined above and maximize level of functional independent mobility and consistency  From PT/mobility standpoint, recommendation at time of d/c would be yet to be determined in future rx's pending progress in order to facilitate return to prior functional level of mobility  Today mobility activities were very limited due to pt's limited activity tolerance due to severe nausea and SOB  Barriers to Discharge: Decreased caregiver support     Recommendation: Defer at this time (Did not see enough mobility due to pt nausea  )     PT - OK to Discharge: No    See flowsheet documentation for full assessment

## 2017-10-18 NOTE — PHYSICAL THERAPY NOTE
Physical Therapy Initial Evaluation   Heladio Carlos, PT   10/18/17 1102   Note Type   Note type Eval/Treat   Pain Assessment   Pain Assessment 0-10   Pain Score No Pain   Home Living   Type of [address] One level;Ramped entrance   Bathroom Shower/Tub Tub/shower unit   Bathroom Toilet Raised   Bathroom Equipment Grab bars in shower; Shower chair   [address] Cane;Grab bars  (Normally does not use a device to walk at home  )   Prior Function   Level of [place] Independent with ADLs and functional mobility   Lives With Daughter; Other (Comment)  (son-in-law  Daughter is semi-retired RN )   Receives Help From Family   ADL Assistance Independent   IADLs Needs assistance  (Family does all IADL's for pt  )   Falls in the last 6 months 0   Vocational Retired   Comments ( and beatriz )   Restrictions/Precautions   [place] Bearing Precautions Per Order No   Braces or Orthoses (none)   Other Precautions O2;Chair Alarm; Fall Risk   General   Additional Pertinent History dx: COPD exacerbation  PMH: HTN, CAD, AAA 3 cm, 3 liters hoime O2  , R renal calculi on x-ray  Family/Caregiver Present No   Cognition   Overall Cognitive Status WFL   Arousal/Participation Alert   Orientation Level Oriented X4   Memory Within functional limits   Following Commands Follows all commands and directions without difficulty   RUE Assessment   RUE Assessment WFL   LUE Assessment   LUE Assessment WFL   RLE Assessment   RLE Assessment WFL  (4-/5)   LLE Assessment   LLE Assessment WFL  (4-/5)   Coordination   Movements are Fluid and Coordinated 1   Sensation WFL   Light Touch   RLE Light Touch Grossly intact   LLE Light Touch Grossly intact   Bed Mobility   Supine to Sit 5  Supervision   Additional items Assist x 1; Increased time required;Verbal cues  (c/o nausea)   Additional Comments (Pt remained seated with chair alarm activated    RN aware )   Transfers   Sit to Stand 5 Supervision   Additional items Assist x 1   Stand to Sit 5  Supervision   Additional items Assist x 1;Verbal cues; Increased time required   Ambulation/Elevation   Gait pattern Excessively slow; Short stride   Gait Assistance 5  Supervision   Additional items Assist x 1;Verbal cues   Assistive Device None   Distance 3'   (Distance limited by nausea  )   Stair Management Assistance (Ramp entrance into pt's home )   Balance   Static Sitting Good   Dynamic Sitting Good   Static Standing Fair +   Dynamic Standing Fair +   Ambulatory Fair  (No device used to 3' ambulation  )   Endurance Deficit   Endurance Deficit Yes   Endurance Deficit Description (Due to nausea )   Activity Tolerance   Activity Tolerance Patient limited by fatigue; Other (Comment)  (Nausea, slight SOB  )   Medical Staff Made Aware AMOS Diaz consented to allow pt to participate in PT eval   RN also very aware of pt's c/o nausea  Nurse Made Aware yes  Assessment   Prognosis Fair   Problem List Decreased endurance; Impaired balance;Decreased mobility  (nausea  )   Assessment Pt is 80 y o  female seen for PT evaluation s/p admit to [facility] on 10/14/2017 w/ COPD exacerbation ([facility] [address] )  PT consulted to assess pt's functional mobility and d/c needs  Order placed for PT eval and tx, w/ up w/ A order  Comorbidities affecting pt's physical performance at time of assessment include: HTN, CAD, AAA, legally blind, and home O2 use of 3 liters daily  PTA, pt was independent w/ all functional mobility w/ no need for an assistive device for ambulation PTA, has 1 VIRGIL, lives w/ family in 1 level home and retired  Personal factors affecting pt at time of IE include: stairs to enter home, inability to perform IADLs, inability to perform ADLs and very limited mobility activity tolerance due to severe nausea at this time     Please find objective findings from PT assessment regarding body systems outlined above with impairments and limitations including impaired balance, decreased endurance, decreased activity tolerance, decreased functional mobility tolerance, fall risk and SOB upon exertion  The following objective measures performed on IE also reveal limitations: Barthel Index: 70/100  Pt's clinical presentation is currently unstable/unpredictable seen in pt's presentation of requiring additional supplimental O2, SOB and severe nausea with mobility activity performance, legal blindness affecting mobility   Pt to benefit from continued PT tx to address deficits as defined above and maximize level of functional independent mobility and consistency  From PT/mobility standpoint, recommendation at time of d/c would be yet to be determined in future rx's pending progress in order to facilitate return to prior functional level of mobility  Today mobility activities were very limited due to pt's limited activity tolerance due to severe nausea and SOB  Barriers to Discharge Decreased caregiver support   Goals   Patient Goals To feel better  STG Expiration Date 10/28/17   Short Term Goal #1 1  Pt completes bed mobility activities and transfers OOB independently with or without AD for ambulation  2  Pt ambulates 150' with or without AD for ambulation with complete independence and good safety noted  3  Pt independently completes home ex program with good skill noted for extremity strengthening  Treatment Day 0   Plan   Treatment/Interventions Functional transfer training;LE strengthening/ROM; Endurance training;Patient/family training;Bed mobility;Gait training;Equipment eval/education;Spoke to nursing;Spoke to case management   PT Frequency 5x/wk   Recommendation   Recommendation Defer at this time  (Did not see enough mobility due to pt nausea  )   Equipment Recommended (Possible need for RW   Still assessing  )   PT - OK to Discharge No   Barthel Index   Feeding 10   Bathing 0   Grooming Score 5   Dressing Score 5   Bladder Score 10   Bowels Score 10   Toilet Use Score 5 Transfers (Bed/Chair) Score 10   Mobility (Level Surface) Score 10   Stairs Score 5   Barthel Index Score 70

## 2017-10-18 NOTE — PROGRESS NOTES
Progress Note - Pulmonary   Vivienne Ortega 80 y o  female MRN: [id number]  Unit/Bed#: -01 Encounter: [id number]    Assessment:  1  COPD exacerbation  2  Acute tracheobronchitis    Plan:  1  COPD exacerbation due to acute tracheobronchitis  - CTA done shows no PE, no infiltrates  On prior CT in 2015 she had 2 tiny nodules in right lung not seen on this exam  - Continue azithromycin course for tracheobronchitis, flu negative, viral panel pending  - She is on her baseline 3L oxygen with sats in mid-high 90s  - Continue with ATC nebs, she is not on any long acting inhalers at home  - Will switch her to PO prednisone today  Will be staying another day due to nausea  - Continue mucinex  - She has not seen us in the office on over 1 year, will need follow up upon discharge      Subjective:   Patient feeling more nauseous today and still fatigued  No shortness of breath or cough  Objective:     Vitals: Blood pressure (!) 178/72, pulse 102, temperature 98 2 °F (36 8 °C), temperature source Oral, resp  rate 16, height 5' 2" (1 575 m), weight 62 6 kg (138 lb 0 1 oz), SpO2 95 %  ,Body mass index is 25 24 kg/m²  Intake/Output Summary (Last 24 hours) at 10/18/17 0930  Last data filed at 10/17/17 1349   Gross per 24 hour   Intake              360 ml   Output                0 ml   Net              360 ml       Invasive Devices     Peripheral Intravenous Line            Peripheral IV 10/14/17 Right Antecubital 3 days                Physical Exam: General appearance: alert, appears stated age and cooperative  Lungs: Improved air movement, scattered expiratory wheeze bilaterally  Heart: regular rate and rhythm and S1, S2 normal  Extremities: extremities normal, atraumatic, no cyanosis or edema  Neurologic: Mental status: Alert, oriented, thought content appropriate     Labs: I have personally reviewed pertinent lab results  , CBC: No results found for: WBC, HGB, HCT, MCV, PLT, ADJUSTEDWBC, MCH, MCHC, RDW, MPV, NRBC, CMP: No results found for: NA, K, CL, CO2, ANIONGAP, BUN, CREATININE, GLUCOSE, CALCIUM, AST, ALT, ALKPHOS, PROT, ALBUMIN, BILITOT, EGFR  Imaging and other studies: I have personally reviewed pertinent reports     and I have personally reviewed pertinent films in PACS

## 2017-10-19 LAB
ADENOVIRUS: NOT DETECTED
C PNEUM DNA SPEC QL NAA+PROBE: NOT DETECTED
FLUAV H1 RNA SPEC QL NAA+PROBE: NOT DETECTED
FLUAV H3 RNA SPEC QL NAA+PROBE: NOT DETECTED
FLUAV RNA SPEC QL NAA+PROBE: NOT DETECTED
FLUBV RNA SPEC QL NAA+PROBE: NOT DETECTED
HBOV DNA SPEC QL NAA+PROBE: NOT DETECTED
HCOV 229E RNA SPEC QL NAA+PROBE: NOT DETECTED
HCOV HKU1 RNA SPEC QL NAA+PROBE: NOT DETECTED
HCOV NL63 RNA SPEC QL NAA+PROBE: NOT DETECTED
HCOV OC43 RNA SPEC QL NAA+PROBE: NOT DETECTED
HPIV1 RNA SPEC QL NAA+PROBE: NOT DETECTED
HPIV2 RNA SPEC QL NAA+PROBE: NOT DETECTED
HPIV3 RNA SPEC QL NAA+PROBE: NOT DETECTED
HPIV4 RNA SPEC QL NAA+PROBE: NOT DETECTED
M PNEUMO DNA SPEC QL NAA+PROBE: NOT DETECTED
METAPNEUMOVIRUS: NOT DETECTED
RHINOVIRUS RNA SPEC QL NAA+PROBE: NOT DETECTED
RSV A RNA SPEC QL NAA+PROBE: NOT DETECTED
RSV B RNA SPEC QL NAA+PROBE: NOT DETECTED

## 2017-10-19 PROCEDURE — 94640 AIRWAY INHALATION TREATMENT: CPT

## 2017-10-19 PROCEDURE — 94760 N-INVAS EAR/PLS OXIMETRY 1: CPT

## 2017-10-19 RX ORDER — PREDNISONE 10 MG/1
TABLET ORAL
Qty: 30 TABLET | Refills: 0 | Status: SHIPPED | OUTPATIENT
Start: 2017-10-19 | End: 2018-07-19 | Stop reason: ALTCHOICE

## 2017-10-19 RX ORDER — ALBUTEROL SULFATE 90 UG/1
2 AEROSOL, METERED RESPIRATORY (INHALATION) EVERY 6 HOURS PRN
Qty: 1 INHALER | Refills: 0 | Status: SHIPPED | OUTPATIENT
Start: 2017-10-19 | End: 2019-03-15 | Stop reason: ALTCHOICE

## 2017-10-19 RX ORDER — BENZONATATE 100 MG/1
100 CAPSULE ORAL 3 TIMES DAILY PRN
Qty: 20 CAPSULE | Refills: 0 | Status: SHIPPED | OUTPATIENT
Start: 2017-10-19 | End: 2018-02-27 | Stop reason: SDUPTHER

## 2017-10-19 RX ORDER — GUAIFENESIN 600 MG
600 TABLET, EXTENDED RELEASE 12 HR ORAL EVERY 12 HOURS SCHEDULED
Refills: 0 | COMMUNITY
Start: 2017-10-19 | End: 2019-03-15 | Stop reason: ALTCHOICE

## 2017-10-19 RX ADMIN — ENOXAPARIN SODIUM 30 MG: 30 INJECTION SUBCUTANEOUS at 09:25

## 2017-10-19 RX ADMIN — IPRATROPIUM BROMIDE AND ALBUTEROL SULFATE 3 ML: .5; 3 SOLUTION RESPIRATORY (INHALATION) at 09:37

## 2017-10-19 RX ADMIN — IPRATROPIUM BROMIDE AND ALBUTEROL SULFATE 3 ML: .5; 3 SOLUTION RESPIRATORY (INHALATION) at 19:23

## 2017-10-19 RX ADMIN — BENZONATATE 100 MG: 100 CAPSULE ORAL at 07:01

## 2017-10-19 RX ADMIN — METOPROLOL TARTRATE 25 MG: 25 TABLET ORAL at 09:24

## 2017-10-19 RX ADMIN — DOCUSATE SODIUM 100 MG: 100 CAPSULE, LIQUID FILLED ORAL at 17:27

## 2017-10-19 RX ADMIN — ASPIRIN 81 MG 81 MG: 81 TABLET ORAL at 09:24

## 2017-10-19 RX ADMIN — IPRATROPIUM BROMIDE AND ALBUTEROL SULFATE 3 ML: .5; 3 SOLUTION RESPIRATORY (INHALATION) at 15:01

## 2017-10-19 RX ADMIN — MELATONIN TAB 3 MG 3 MG: 3 TAB at 21:25

## 2017-10-19 RX ADMIN — PRAVASTATIN SODIUM 80 MG: 80 TABLET ORAL at 17:27

## 2017-10-19 RX ADMIN — METOPROLOL TARTRATE 25 MG: 25 TABLET ORAL at 17:27

## 2017-10-19 RX ADMIN — CLOPIDOGREL BISULFATE 75 MG: 75 TABLET ORAL at 09:24

## 2017-10-19 RX ADMIN — GUAIFENESIN 600 MG: 600 TABLET, EXTENDED RELEASE ORAL at 21:25

## 2017-10-19 RX ADMIN — PREDNISONE 40 MG: 20 TABLET ORAL at 09:24

## 2017-10-19 RX ADMIN — AZITHROMYCIN 250 MG: 250 TABLET, FILM COATED ORAL at 06:56

## 2017-10-19 RX ADMIN — PANTOPRAZOLE SODIUM 40 MG: 40 TABLET, DELAYED RELEASE ORAL at 06:56

## 2017-10-19 RX ADMIN — DOCUSATE SODIUM 100 MG: 100 CAPSULE, LIQUID FILLED ORAL at 09:24

## 2017-10-19 RX ADMIN — GUAIFENESIN 600 MG: 600 TABLET, EXTENDED RELEASE ORAL at 09:26

## 2017-10-19 NOTE — DISCHARGE SUMMARY
Discharge Summary - [facility] Internal Medicine    Patient Information: Alicia López 80 y o  female MRN: [id number]  Unit/Bed#: -01 Encounter: [id number]    Discharging Physician / Practitioner: Hari Joe MD  PCP: Ced Rae DO  Admission Date: 10/14/2017  Discharge Date: 10/19/17    Reason for Admission:  COPD exacerbation    Discharge Diagnoses:     Principal Problem:    COPD exacerbation ([address] )  Active Problems:    HTN (hypertension)    CAD (coronary artery disease)  Resolved Problems:    * No resolved hospital problems  *   Nausea has resolved    Consultations During Hospital Stay:  · [facility] pulmonology    Procedures Performed:     CT scan:  1  No pulmonary emboli demonstrated  2   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  3   Complex right upper renal cyst may be correlated with ultrasound  Significant Findings / Test Results:     · None  Incidental Findings:   · Renal cyst which could be evaluated as an outpatient with an ultrasound     Test Results Pending at Discharge (will require follow up): · None     Outpatient Tests Requested:  · None    Complications:  None    Hospital Course: Alicia López is a 80 y o  female patient who originally presented to the hospital on 10/14/2017 due to shortness of breath  History of presenting Kun Meza is a 80 y o  female who presents with complaints of abdominal pain and shortness of breath  Pt states that she has had pain for 2 days  Pt denies fever, chills  Pt states that she has a cough and did vomit today  Pt denies diarrhea, constipation  Pt denies chest pain    Hospital course: The patient was admitted for the above reasons  She was admitted for COPD exacerbation was put on IV steroids  Eventually  started to do better from the shortness of breath standpoint however when she was on the higher dose steroid she did feel nauseous which did resolve    She was given some Zofran and from that standpoint she is doing fine and tolerating a diet  She was seen in consultation by pulmonology who stated from their standpoint patient is medically stable for discharge at this time  The patient is not on any long-acting inhalers at home  The patient should be on a Ventolin  The patient was found to have this aneurysm which is 3 mm which get a follow-up any urine she was also found to have this complex renal mass which could be followed up as an outpatient  She can get an ultrasound as an outpatient but she is otherwise doing okay and can't get visiting nurses at home  Condition at Discharge: fair     Discharge Day Visit / Exam:     Subjective:  Patient seen and examined  She feels little bit better today  Vitals: Blood Pressure: 166/72 (10/19/17 0700)  Pulse: 79 (10/19/17 0700)  Temperature: 98 2 °F (36 8 °C) (10/19/17 0700)  Temp Source: Oral (10/19/17 0700)  Respirations: 18 (10/19/17 0700)  Height: 5' 2" (157 5 cm) (10/14/17 1914)  Weight - Scale: 62 6 kg (138 lb 0 1 oz) (10/14/17 1914)  SpO2: 97 % (10/19/17 7266)  Exam:   Physical Exam  (   General Appearance:    Alert, cooperative, no distress, appears stated age                               Lungs:     Clear to auscultation bilaterally, respirations unlabored       Heart:    Regular rate and rhythm, S1 and S2 normal, no murmur, rub    or gallop   Abdomen:     Soft, non-tender, bowel sounds active all four quadrants,     no masses, no organomegaly           Extremities:   Extremities normal, atraumatic, no cyanosis or edema   Discussion with Family:  Patient    Discharge instructions/Information to patient and family:   See after visit summary for information provided to patient and family  Provisions for Follow-Up Care:  See after visit summary for information related to follow-up care and any pertinent home health orders        Disposition:     Home with VNA Services (Reminder: Complete face to face encounter)    For Discharges to [place] [address] Affiliated SNF:   · Not Applicable to this Patient - Not Applicable to this Patient    Planned Readmission:  None anticipated     Discharge Statement:  I spent 28 minutes discharging the patient  This time was spent on the day of discharge  I had direct contact with the patient on the day of discharge  Greater than 50% of the total time was spent examining patient, answering all patient questions, arranging and discussing plan of care with patient as well as directly providing post-discharge instructions  Additional time then spent on discharge activities  Discharge Medications:  See after visit summary for reconciled discharge medications provided to patient and family        ** Please Note: This note has been constructed using a voice recognition system **

## 2017-10-19 NOTE — SOCIAL WORK
Cm met with patient this day regarding dcp  Patient reports being receptive to dcp  Cm provided accepting vna ([facility])  Patient reports being receptive to VNA and also reports being able to manage her care at home  Cm reviewed imm letter with patient, patient reports understanding imm letter, no needs at this time  Cm contacted patient daughter Martina Agarwal regarding dcp and reviewed accepting vna  Daughter reports being receptive to vna and also requesting a rolling walker  imm letter reviewed with patient daughter, daughter reports no additional questions or objections  Daughter stated she will transport patient home upon discharge and bring O2 tank as well  Daughter requesting a rolling walker upon discharge  Referral sent  No reported needs at this time

## 2017-10-19 NOTE — PROGRESS NOTES
Progress Note - Pulmonary   Emerson Sero 80 y o  female MRN: [id number]  Unit/Bed#: -01 Encounter: [id number]    Assessment:  1  COPD exacerbation  2  Acute tracheobronchitis    Plan:  1  COPD exacerbation due to acute tracheobronchitis  - CTA done shows no PE, no infiltrates  On prior CT in 2015 she had 2 tiny nodules in right lung not seen on this exam  - Today is day 5/5 azithromycin  - Flu and respiratory pathogen profile are negative  - She is on her baseline 3L oxygen with sats in mid-high 90s  - Continue with ATC nebs, she is not on any long acting inhalers at home  - Continue PO prednisone taper  - Continue mucinex  - Patient is stable for discharge home from pulmonary standpoint with Prednisone taper  She has not seen us in the office on over 1 year, will need follow up upon discharge      Subjective:   Patient is feeling better today, has more energy and was able to eat  No shortness of breath, still with some mild cough  Objective:     Vitals: Blood pressure 166/72, pulse 79, temperature 98 2 °F (36 8 °C), temperature source Oral, resp  rate 18, height 5' 2" (1 575 m), weight 62 6 kg (138 lb 0 1 oz), SpO2 97 %  ,Body mass index is 25 24 kg/m²  Intake/Output Summary (Last 24 hours) at 10/19/17 1018  Last data filed at 10/18/17 1850   Gross per 24 hour   Intake              180 ml   Output                0 ml   Net              180 ml       Invasive Devices     Peripheral Intravenous Line            Peripheral IV 10/14/17 Right Antecubital 4 days                Physical Exam: General appearance: alert, appears stated age and cooperative  Lungs: Diminished breath sounds bilaterally, no wheezing or rhonchi  Heart: regular rate and rhythm and S1, S2 normal  Extremities: extremities normal, atraumatic, no cyanosis or edema  Neurologic: Mental status: Alert, oriented, thought content appropriate     Labs: I have personally reviewed pertinent lab results  , CBC: No results found for: WBC, HGB, HCT, MCV, PLT, ADJUSTEDWBC, MCH, MCHC, RDW, MPV, NRBC, CMP: No results found for: NA, K, CL, CO2, ANIONGAP, BUN, CREATININE, GLUCOSE, CALCIUM, AST, ALT, ALKPHOS, PROT, ALBUMIN, BILITOT, EGFR  Imaging and other studies: I have personally reviewed pertinent reports     and I have personally reviewed pertinent films in PACS

## 2017-10-20 ENCOUNTER — APPOINTMENT (INPATIENT)
Dept: RADIOLOGY | Facility: HOSPITAL | Age: 82
DRG: 189 | End: 2017-10-20
Payer: COMMERCIAL

## 2017-10-20 PROCEDURE — 97110 THERAPEUTIC EXERCISES: CPT

## 2017-10-20 PROCEDURE — 97116 GAIT TRAINING THERAPY: CPT

## 2017-10-20 PROCEDURE — 94760 N-INVAS EAR/PLS OXIMETRY 1: CPT

## 2017-10-20 PROCEDURE — 94640 AIRWAY INHALATION TREATMENT: CPT

## 2017-10-20 PROCEDURE — 71010 HB CHEST X-RAY 1 VIEW FRONTAL (PORTABLE): CPT

## 2017-10-20 RX ORDER — LEVALBUTEROL 1.25 MG/.5ML
1.25 SOLUTION, CONCENTRATE RESPIRATORY (INHALATION)
Status: DISCONTINUED | OUTPATIENT
Start: 2017-10-20 | End: 2017-10-21 | Stop reason: HOSPADM

## 2017-10-20 RX ADMIN — DOCUSATE SODIUM 100 MG: 100 CAPSULE, LIQUID FILLED ORAL at 17:28

## 2017-10-20 RX ADMIN — MELATONIN TAB 3 MG 3 MG: 3 TAB at 21:22

## 2017-10-20 RX ADMIN — CLOPIDOGREL BISULFATE 75 MG: 75 TABLET ORAL at 10:34

## 2017-10-20 RX ADMIN — LEVALBUTEROL HYDROCHLORIDE 1.25 MG: 1.25 SOLUTION, CONCENTRATE RESPIRATORY (INHALATION) at 16:12

## 2017-10-20 RX ADMIN — LEVALBUTEROL HYDROCHLORIDE 1.25 MG: 1.25 SOLUTION, CONCENTRATE RESPIRATORY (INHALATION) at 19:32

## 2017-10-20 RX ADMIN — GUAIFENESIN 600 MG: 600 TABLET, EXTENDED RELEASE ORAL at 10:34

## 2017-10-20 RX ADMIN — METOPROLOL TARTRATE 25 MG: 25 TABLET ORAL at 10:32

## 2017-10-20 RX ADMIN — IPRATROPIUM BROMIDE AND ALBUTEROL SULFATE 3 ML: .5; 3 SOLUTION RESPIRATORY (INHALATION) at 09:31

## 2017-10-20 RX ADMIN — IPRATROPIUM BROMIDE 0.5 MG: 0.5 SOLUTION RESPIRATORY (INHALATION) at 19:32

## 2017-10-20 RX ADMIN — DOCUSATE SODIUM 100 MG: 100 CAPSULE, LIQUID FILLED ORAL at 10:35

## 2017-10-20 RX ADMIN — ENOXAPARIN SODIUM 30 MG: 30 INJECTION SUBCUTANEOUS at 10:32

## 2017-10-20 RX ADMIN — GUAIFENESIN 600 MG: 600 TABLET, EXTENDED RELEASE ORAL at 21:22

## 2017-10-20 RX ADMIN — IPRATROPIUM BROMIDE 0.5 MG: 0.5 SOLUTION RESPIRATORY (INHALATION) at 16:12

## 2017-10-20 RX ADMIN — PREDNISONE 40 MG: 20 TABLET ORAL at 10:35

## 2017-10-20 RX ADMIN — ASPIRIN 81 MG 81 MG: 81 TABLET ORAL at 10:34

## 2017-10-20 RX ADMIN — METOPROLOL TARTRATE 25 MG: 25 TABLET ORAL at 17:27

## 2017-10-20 RX ADMIN — PANTOPRAZOLE SODIUM 40 MG: 40 TABLET, DELAYED RELEASE ORAL at 06:07

## 2017-10-20 NOTE — PROGRESS NOTES
Progress Note - Pulmonary   Silverio Parmar 80 y o  female MRN: [id number]  Unit/Bed#: -01 Encounter: [id number]    Assessment:  1  COPD exacerbation  2  Acute tracheobronchitis    Plan:  1  COPD exacerbation due to acute tracheobronchitis  - CTA done on admission shows no PE, no infiltrates  On prior CT in 2015 she had 2 tiny nodules in right lung not seen on this exam  - Completed azithromycin yesterday  - Flu and respiratory pathogen profile are negative  - She reports feeling more short of breath today, no increased oxygen requirement, lung exam without wheezing  Chest x-ray looks clear, does not look to be related to her COPD, would continue her prednisone taper  - She is on her baseline 3L oxygen with sats in mid-high 90s  - Continue with ATC nebs, she is not on any long acting inhalers at home  - Continue PO prednisone taper  - Continue mucinex      Subjective:   Patient more short of breath today, was feeling nauseous as well  Objective:     Vitals: Blood pressure 141/78, pulse 85, temperature 98 1 °F (36 7 °C), temperature source Oral, resp  rate 18, height 5' 2" (1 575 m), weight 62 6 kg (138 lb 0 1 oz), SpO2 91 %  ,Body mass index is 25 24 kg/m²  Intake/Output Summary (Last 24 hours) at 10/20/17 1406  Last data filed at 10/20/17 0931   Gross per 24 hour   Intake              240 ml   Output                0 ml   Net              240 ml       Invasive Devices     Peripheral Intravenous Line            Peripheral IV 10/19/17 Left Wrist less than 1 day                Physical Exam: General appearance: alert, appears stated age and cooperative  Lungs: Decreased breath sounds bilaterally, no wheezing or rhonchi  Heart: regular rate and rhythm and S1, S2 normal  Extremities: extremities normal, atraumatic, no cyanosis or edema  Neurologic: Mental status: Alert, oriented, thought content appropriate     Labs: I have personally reviewed pertinent lab results  , CBC: No results found for: WBC, HGB, HCT, MCV, PLT, ADJUSTEDWBC, MCH, MCHC, RDW, MPV, NRBC, CMP: No results found for: NA, K, CL, CO2, ANIONGAP, BUN, CREATININE, GLUCOSE, CALCIUM, AST, ALT, ALKPHOS, PROT, ALBUMIN, BILITOT, EGFR  Imaging and other studies: I have personally reviewed pertinent reports     and I have personally reviewed pertinent films in PACS

## 2017-10-20 NOTE — CASE MANAGEMENT
Continued Stay Review     Date: 10/20/2017    Vital Signs: /74   Pulse 79   Temp 97 8 °F (36 6 °C) (Oral)   Resp 20   Ht 5' 2" (1 575 m)   Wt 62 6 kg (138 lb 0 1 oz)   LMP  (LMP Unknown)   SpO2 94%   BMI 25 24 kg/m²     Medications:   Scheduled Meds:   aspirin 81 mg Oral Daily   clopidogrel 75 mg Oral Daily   docusate sodium 100 mg Oral BID   enoxaparin 30 mg Subcutaneous Daily   guaiFENesin 600 mg Oral Q12H GABRIELA   ipratropium 0 5 mg Nebulization TID   levalbuterol 1 25 mg Nebulization TID   melatonin 3 mg Oral HS   metoprolol tartrate 25 mg Oral BID   pantoprazole 40 mg Oral Early Morning   pravastatin 80 mg Oral Daily With Dinner   predniSONE 40 mg Oral Daily     Continuous Infusions:    PRN Meds:   albuterol **AND** [DISCONTINUED] sodium chloride    benzonatate    calcium carbonate    HYDROcodone-acetaminophen    morphine injection    Ondansetron    O2 at 3 liters    + N/V on 10/19  Still with nausea today    Abnormal Labs/Diagnostic Results: no labs  CXR no active disease    Age/Sex: 80 y o  female     Assessment/Plan: COPD exacerbation (HCC)  Active Problems:    HTN (hypertension)    CAD (coronary artery disease)     Plan:     · COPD exacerbation this is stable  Continue with the steroids  · Nausea and vomiting: This could be secondary to the steroids  · Weakness:  Awaiting physical therapy evaluation  · History of coronary disease:  Continue home medications  · Tachycardia: This has resolved  This was likely secondary to the steroids  · AAA:  This is 3 cm  Follow up as an outpatient       Discharge Plan: to be determined; anticipate home with Andreakathe Rodriguez once medically cleared    [address] [place] [address] in the [facility] by Isaac Dorado for 2017  Network Utilization Review Department  Phone: [phone]; Fax [phone]  ATTENTION: The Network Utilization Review Department is now centralized for our 7 Facilities   Make a note that we have a new phone and fax numbers for our Department  Please call with any questions or concerns to [phone] and carefully follow the prompts so that you are directed to the right person  All voicemails are confidential  Fax any determinations, approvals, denials, and requests for initial or continue stay review clinical to [phone]  Due to HIGH CALL volume, it would be easier if you could please send faxed requests to expedite your requests and in part, help us provide discharge notifications faster

## 2017-10-20 NOTE — PROGRESS NOTES
[address] Internal Medicine Progress Note  Patient: Ty East 80 y o  female   MRN: [id number]  PCP: Carlos Yates DO  Unit/Bed#: -01 Encounter: [id number]  Date Of Visit: 10/20/17    Assessment:    Principal Problem:    COPD exacerbation ([address] )  Active Problems:    HTN (hypertension)    CAD (coronary artery disease)      Plan:    · COPD exacerbation this is stable  Continue with the steroids  · Nausea and vomiting: This could be secondary to the steroids  · Weakness:  Awaiting physical therapy evaluation  · History of coronary disease:  Continue home medications  · Tachycardia: This has resolved  This was likely secondary to the steroids  · AAA:  This is 3 cm  Follow up as an outpatient  ·       VTE Pharmacologic Prophylaxis:   Pharmacologic: Enoxaparin (Lovenox)  Mechanical VTE Prophylaxis in Place: Yes    Patient Centered Rounds: I have performed bedside rounds with nursing staff today  Discussions with Specialists or Other Care Team Provider:  Case management    Education and Discussions with Family / Patient:  Patient    Time Spent for Care: 20 minutes  More than 50% of total time spent on counseling and coordination of care as described above  Current Length of Stay: 5 day(s)    Current Patient Status: Inpatient       Discharge Plan:  Patient will be discharged if able to tolerate a diet and seen by Physical therapy and okay from their standpoint to go home    Code Status: Level 3 - DNAR and DNI      Subjective:   Patient seen examined  She was discharged yesterday however she was nauseous and vomited and therefore her discharge was delayed    Objective:     Vitals:   Temp (24hrs), Av 4 °F (36 9 °C), Min:98 2 °F (36 8 °C), Max:98 7 °F (37 1 °C)    HR:  [57-78] 57  Resp:  [18] 18  BP: (151-170)/(65-70) 170/70  SpO2:  [35 %-98 %] 90 %  Body mass index is 25 24 kg/m²       Input and Output Summary (last 24 hours):     No intake or output data in the 24 hours ending 10/20/17 5429    Physical Exam:     General Appearance:    Alert, cooperative, no distress, appears stated age                               Lungs:     Clear to auscultation bilaterally, respirations unlabored       Heart:    Regular rate and rhythm, S1 and S2 normal, no murmur, rub    or gallop   Abdomen:     Soft, non-tender, bowel sounds active all four quadrants,     no masses, no organomegaly           Extremities:   Extremities normal, atraumatic, no cyanosis or edema                       Additional Data:     Labs:      Results from last 7 days  Lab Units 10/16/17  0450  10/14/17  2009   WBC Thousand/uL 11 94*  < > 15 18*   HEMOGLOBIN g/dL 11 4*  < > 12 6   HEMATOCRIT % 36 6  < > 39 1   PLATELETS Thousands/uL 267  < > 295   LYMPHO PCT %  --   --  6*   MONO PCT MAN %  --   --  2*   EOSINO PCT MANUAL %  --   --  0   < > = values in this interval not displayed  Results from last 7 days  Lab Units 10/16/17  0450   SODIUM mmol/L 136   POTASSIUM mmol/L 4 7   CHLORIDE mmol/L 101   CO2 mmol/L 32   BUN mg/dL 20   CREATININE mg/dL 0 79   CALCIUM mg/dL 9 4   TOTAL PROTEIN g/dL 7 4   BILIRUBIN TOTAL mg/dL 0 20   ALK PHOS U/L 71   ALT U/L 9*   AST U/L 13   GLUCOSE RANDOM mg/dL 196*           * I Have Reviewed All Lab Data Listed Above  * Additional Pertinent Lab Tests Reviewed:  [address] Admission Reviewed        Recent Cultures (last 7 days):       Results from last 7 days  Lab Units 10/16/17  1359   INFLUENZA A PCR  None Detected   INFLUENZA B PCR  None Detected   RSV PCR  None Detected       Last 24 Hours Medication List:     aspirin 81 mg Oral Daily   clopidogrel 75 mg Oral Daily   docusate sodium 100 mg Oral BID   enoxaparin 30 mg Subcutaneous Daily   guaiFENesin 600 mg Oral Q12H [address]   ipratropium-albuterol 3 mL Nebulization TID   melatonin 3 mg Oral HS   metoprolol tartrate 25 mg Oral BID   pantoprazole 40 mg Oral Early Morning   pravastatin 80 mg Oral Daily With Dinner   predniSONE 40 mg Oral Daily Today, Patient Was Seen By: Oumou Duran MD    ** Please Note: Dragon 360 Dictation voice to text software may have been used in the creation of this document   **

## 2017-10-20 NOTE — PHYSICAL THERAPY NOTE
Physical Therapy Treatment Note       10/20/17 7938   Pain Assessment   Pain Assessment No/denies pain   Pain Score No Pain   Restrictions/Precautions   Weight Bearing Precautions Per Order No   Braces or Orthoses (none per pt)   Other Precautions O2;Fall Risk   General   Chart Reviewed Yes   Response to Previous Treatment Patient with no complaints from previous session  Family/Caregiver Present No   Cognition   Overall Cognitive Status WFL   Arousal/Participation Alert; Cooperative   Attention Within functional limits   Orientation Level Oriented X4   Memory Within functional limits   Following Commands Follows all commands and directions without difficulty   Comments pt agreeable to PT IE   Subjective   Subjective "if I can't have quality of life, I don't want to have quantity of it"   Bed Mobility   Supine to Sit 5  Supervision   Additional items Assist x 1;HOB elevated; Increased time required   Sit to Supine 5  Supervision   Additional items Assist x 1;HOB elevated; Increased time required   Transfers   Sit to Stand 5  Supervision   Additional items Increased time required;Verbal cues   Stand to Sit 5  Supervision   Additional items Increased time required;Verbal cues   Toilet transfer 5  Supervision   Additional items Increased time required;Verbal cues;Standard toilet   Additional Comments Pt reporting she has been ambulating to/from BR w/ staff A prior to PT session  Ambulation/Elevation   Gait pattern Inconsistent april; Short stride; Step to   Gait Assistance 5  Supervision   Additional items Assist x 1;Verbal cues  (additional A to manage O2 tank)   Assistive Device Rolling walker   Distance 20' x2 (to/from BR)   Stair Management Assistance Not tested   Balance   Static Sitting Good   Dynamic Sitting Good   Static Standing Fair +   Dynamic Standing Fair +   Ambulatory Fair +  (w/ RW)   Endurance Deficit   Endurance Deficit Yes   Endurance Deficit Description mild SOB w/ exertion, note SpO2 reading on 3L O2 NC 98% s/p mobility and HR 92bpm   Activity Tolerance   Activity Tolerance Patient limited by fatigue; Other (Comment)  (mild SOB)   Medical Staff Made Aware yes, spoke to Dr Jane Juárez concerning PT recs   Nurse Made Aware yes, RN Anthony West verbalized pt appropriate to see, cleared pt for PT session, made aware of session outcome/recs   Exercises   Heelslides Sitting;5 reps;AROM; Bilateral   Glute Sets Sitting;5 reps;AROM; Bilateral   Hip Abduction Sitting;5 reps;AROM; Bilateral   Hip Adduction Sitting;5 reps;AROM; Bilateral   Knee AROM Long Arc Quad Sitting;5 reps;AROM; Bilateral   Ankle Pumps Sitting;5 reps;AROM; Bilateral   Marching Sitting;5 reps;AROM; Bilateral   Assessment   Prognosis Fair   Problem List Decreased endurance; Impaired balance;Decreased mobility   Assessment Pt seen for PT treatment session this date with interventions consisting of gait training w/ emphasis on improving pt's ability to ambulate level surfaces x 20' x2 with SBA provided by therapist with RW and Therapeutic exercise consisting of: AROM 5 B LE in sitting position  Pt agreeable to PT treatment session upon arrival, pt found supine in bed w/ HOB elevated, in no apparent distress, A&O x 4 and responsive  In comparison to previous session, pt with improvements in level surface ambulation to/from BR w/ close SBA, no overt LOB observed, decreased level of A for all phases of mobility, initiation of B LE Cely w/ full ROM observed in pain free range  B LE HEP handout provided and reviewed for Cely as demonstrated above, pt noting 2* impaired vision- she will not be able to visualize the handout, but noting would be beneficial to have so family can review with her  Pt w/ fair carryover for HEP, verbalizing understanding  PT to assess carryover next session   Post session: pt returned BTB, all needs in reach and RN notified of session findings/recommendations and NSG staff educated/encouraged to mobilize pt ad nathalia Continue to recommend Home PT w/ family support vs  STR at time of d/c in order to maximize pt's functional independence and safety w/ mobility  Pt continues to be functioning below baseline level, and remains limited 2* factors listed above and including at risk for falls  PT will continue to see pt while here in order to address the deficits listed above and provide interventions consistent w/ POC in effort to achieve STGs  Barriers to Discharge None   Barriers to Discharge Comments pt reporting she has 24/7 A/S from family at baseline   Goals   Patient Goals to return home   STG Expiration Date 10/28/17   Treatment Day 1   Plan   Treatment/Interventions Functional transfer training;LE strengthening/ROM; Endurance training;Patient/family training;Bed mobility;Gait training;Equipment eval/education;Spoke to nursing;Spoke to case management;Spoke to MD   Progress Slow progress, decreased activity tolerance   PT Frequency 5x/wk   Recommendation   Recommendation Home PT; Home with family support  (vs STR pending progress)   Equipment Recommended Walker  (RW)   PT - OK to Discharge Yes  (when medically cleared if w/ HHPT and 24/7A from family)       Aliya Selby, PT, DPT    Time of PT treatment session: [phone]

## 2017-10-20 NOTE — PLAN OF CARE
Problem: PHYSICAL THERAPY ADULT  Goal: Performs mobility at highest level of function for planned discharge setting  See evaluation for individualized goals  Treatment/Interventions: Functional transfer training, LE strengthening/ROM, Endurance training, Patient/family training, Bed mobility, Gait training, Equipment eval/education, Spoke to nursing, Spoke to case management  Equipment Recommended:  (Possible need for RW  Still assessing  )       See flowsheet documentation for full assessment, interventions and recommendations  Outcome: Progressing  Prognosis: Fair  Problem List: Decreased endurance, Impaired balance, Decreased mobility  Assessment: Pt seen for PT treatment session this date with interventions consisting of gait training w/ emphasis on improving pt's ability to ambulate level surfaces x 20' x2 with SBA provided by therapist with RW and Therapeutic exercise consisting of: AROM 5 B LE in sitting position  Pt agreeable to PT treatment session upon arrival, pt found supine in bed w/ HOB elevated, in no apparent distress, A&O x 4 and responsive  In comparison to previous session, pt with improvements in level surface ambulation to/from BR w/ close SBA, no overt LOB observed, decreased level of A for all phases of mobility, initiation of B LE Cely w/ full ROM observed in pain free range  B LE HEP handout provided and reviewed for Cely as demonstrated above, pt noting 2* impaired vision- she will not be able to visualize the handout, but noting would be beneficial to have so family can review with her  Pt w/ fair carryover for HEP, verbalizing understanding  PT to assess carryover next session  Post session: pt returned BTB, all needs in reach and RN notified of session findings/recommendations and NSG staff educated/encouraged to mobilize pt ad nathalia Continue to recommend Home PT w/ family support vs  STR at time of d/c in order to maximize pt's functional independence and safety w/ mobility   Pt continues to be functioning below baseline level, and remains limited 2* factors listed above and including at risk for falls  PT will continue to see pt while here in order to address the deficits listed above and provide interventions consistent w/ POC in effort to achieve STGs  Barriers to Discharge: None  Barriers to Discharge Comments: pt reporting she has 24/7 A/S from family at baseline  Recommendation: Home PT, Home with family support (vs STR pending progress)     PT - OK to Discharge: Yes (when medically cleared if w/ HHPT and 24/7A from family)    See flowsheet documentation for full assessment

## 2017-10-21 VITALS
DIASTOLIC BLOOD PRESSURE: 51 MMHG | BODY MASS INDEX: 25.4 KG/M2 | OXYGEN SATURATION: 95 % | HEART RATE: 93 BPM | SYSTOLIC BLOOD PRESSURE: 105 MMHG | RESPIRATION RATE: 17 BRPM | TEMPERATURE: 97.8 F | HEIGHT: 62 IN | WEIGHT: 138.01 LBS

## 2017-10-21 PROBLEM — J44.1 COPD EXACERBATION (HCC): Status: RESOLVED | Noted: 2017-10-15 | Resolved: 2017-10-21

## 2017-10-21 PROCEDURE — 94640 AIRWAY INHALATION TREATMENT: CPT

## 2017-10-21 PROCEDURE — 94760 N-INVAS EAR/PLS OXIMETRY 1: CPT

## 2017-10-21 RX ADMIN — GUAIFENESIN 600 MG: 600 TABLET, EXTENDED RELEASE ORAL at 09:05

## 2017-10-21 RX ADMIN — ENOXAPARIN SODIUM 30 MG: 30 INJECTION SUBCUTANEOUS at 09:05

## 2017-10-21 RX ADMIN — IPRATROPIUM BROMIDE 0.5 MG: 0.5 SOLUTION RESPIRATORY (INHALATION) at 07:38

## 2017-10-21 RX ADMIN — PANTOPRAZOLE SODIUM 40 MG: 40 TABLET, DELAYED RELEASE ORAL at 06:09

## 2017-10-21 RX ADMIN — CLOPIDOGREL BISULFATE 75 MG: 75 TABLET ORAL at 09:05

## 2017-10-21 RX ADMIN — ASPIRIN 81 MG 81 MG: 81 TABLET ORAL at 09:05

## 2017-10-21 RX ADMIN — IPRATROPIUM BROMIDE 0.5 MG: 0.5 SOLUTION RESPIRATORY (INHALATION) at 13:40

## 2017-10-21 RX ADMIN — LEVALBUTEROL HYDROCHLORIDE 1.25 MG: 1.25 SOLUTION, CONCENTRATE RESPIRATORY (INHALATION) at 07:38

## 2017-10-21 RX ADMIN — LEVALBUTEROL HYDROCHLORIDE 1.25 MG: 1.25 SOLUTION, CONCENTRATE RESPIRATORY (INHALATION) at 13:40

## 2017-10-21 RX ADMIN — METOPROLOL TARTRATE 25 MG: 25 TABLET ORAL at 09:05

## 2017-10-21 RX ADMIN — PREDNISONE 40 MG: 20 TABLET ORAL at 09:04

## 2017-10-21 RX ADMIN — DOCUSATE SODIUM 100 MG: 100 CAPSULE, LIQUID FILLED ORAL at 09:04

## 2017-10-21 NOTE — SOCIAL WORK
CM met with pt at bedside    She is to be discharged today with daughter Maria D Person with [facility]

## 2017-10-21 NOTE — PLAN OF CARE
Problem: DISCHARGE PLANNING - CARE MANAGEMENT  Goal: Discharge to post-acute care or home with appropriate resources  INTERVENTIONS:  - Conduct assessment to determine patient/family and health care team treatment goals, and need for post-acute services based on payer coverage, community resources, and patient preferences, and barriers to discharge  - Address psychosocial, clinical, and financial barriers to discharge as identified in assessment in conjunction with the patient/family and health care team  - Arrange appropriate level of post-acute services according to patient's   needs and preference and payer coverage in collaboration with the physician and health care team  - Communicate with and update the patient/family, physician, and health care team regarding progress on the discharge plan  - Arrange appropriate transportation to post-acute venues   Outcome: Completed Date Met: 10/21/17  CM met with pt at bedside    She is to be discharged today with daughter Petrona Clement with [facility]

## 2017-10-21 NOTE — DISCHARGE SUMMARY
Discharge Summary - [facility] Internal Medicine    Patient Information: Ronel Dee 80 y o  female MRN: [id number]  Unit/Bed#: -01 Encounter: [id number]    Discharging Physician / Practitioner: Oumou Duran MD  PCP: Twyla Hadley DO  Admission Date: 10/14/2017  Discharge Date: 10/21/17    Reason for Admission:  COPD exacerbation    Discharge Diagnoses:     Principal Problem (Resolved):    COPD exacerbation ([address] )  Active Problems:    HTN (hypertension)    CAD (coronary artery disease)      Consultations During Hospital Stay:  · [facility] pulmonology    Procedures Performed:     CT scan:  1   No pulmonary emboli demonstrated  2   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  3   Complex right upper renal cyst may be correlated with ultrasound  Significant Findings / Test Results:     · None    Incidental Findings:   · Renal cyst which could be evaluated as an outpatient ultrasound     Test Results Pending at Discharge (will require follow up): · None     Outpatient Tests Requested:  · None    Complications:  None    Hospital Course: Ronel Dee is a 80 y o  female patient who originally presented to the hospital on 10/14/2017 due to shortness of breath  History of presenting illness:Myriam Hendrix is a 80 y  o  female who presents with complaints of abdominal pain and shortness of breath   Pt states that she has had pain for 2 days  Pt denies fever, chills   Pt states that she has a cough and did vomit today  Pt denies diarrhea, constipation   Pt denies chest pain     Hospital course: The patient was admitted for the above reasons  She was admitted for COPD exacerbation was put on IV steroids  Eventually  started to do better from the shortness of breath standpoint however when she was on the higher dose steroid she did feel nauseous which did resolve  She was given some Zofran and from that standpoint she is doing fine and tolerating a diet    She was seen in consultation by pulmonology who stated from their standpoint patient is medically stable for discharge at this time  The patient is not on any long-acting inhalers at home  The patient should be on a Ventolin  The patient was found to have this aneurysm which is 3 mm which get a follow-up any urine she was also found to have this complex renal mass which could be followed up as an outpatient  She can get an ultrasound as an outpatient but she is otherwise doing okay and can get visiting nursing  The patient's discharge was held for couple of days because she did have some nausea and vomiting  Daughter states because we were giving her pravastatin which was substituted for the patient's Crestor  Both those medications or statins  She can resume her Crestor as an outpatient and the patient is medically stable for discharge at this time  Condition at Discharge: good     Discharge Day Visit / Exam:     Subjective:  Patient seen examined  She feels well today and has no complaints    Vitals: Blood Pressure: 145/65 (10/20/17 2300)  Pulse: 74 (10/20/17 2300)  Temperature: 97 6 °F (36 4 °C) (10/20/17 2300)  Temp Source: Oral (10/20/17 2300)  Respirations: 16 (10/20/17 2300)  Height: 5' 2" (157 5 cm) (10/14/17 1914)  Weight - Scale: 62 6 kg (138 lb 0 1 oz) (10/14/17 1914)  SpO2: 98 % (10/21/17 0740)  Exam:   Physical Exam  (   General Appearance:    Alert, cooperative, no distress, appears stated age                               Lungs:     Clear to auscultation bilaterally, respirations unlabored       Heart:    Regular rate and rhythm, S1 and S2 normal, no murmur, rub    or gallop   Abdomen:     Soft, non-tender, bowel sounds active all four quadrants,     no masses, no organomegaly           Extremities:   Extremities normal, atraumatic, no cyanosis or edema   Discussion with Family:  Patient    Discharge instructions/Information to patient and family:   See after visit summary for information provided to patient and family  Provisions for Follow-Up Care:  See after visit summary for information related to follow-up care and any pertinent home health orders  Disposition:     Home with VNA Services (Reminder: Complete face to face encounter)    For Discharges to [address] SN[address]:   · Not Applicable to this Patient - Not Applicable to this Patient    Planned Readmission:  None anticipated     Discharge Statement:  I spent 20 minutes discharging the patient  This time was spent on the day of discharge  I had direct contact with the patient on the day of discharge  Greater than 50% of the total time was spent examining patient, answering all patient questions, arranging and discussing plan of care with patient as well as directly providing post-discharge instructions  Additional time then spent on discharge activities  Discharge Medications:  See after visit summary for reconciled discharge medications provided to patient and family        ** Please Note: This note has been constructed using a voice recognition system **

## 2017-10-21 NOTE — PLAN OF CARE
DISCHARGE PLANNING     Discharge to home or other facility with appropriate resources Progressing        DISCHARGE PLANNING - CARE MANAGEMENT     Discharge to post-acute care or home with appropriate resources Progressing        INFECTION - ADULT     Absence or prevention of progression during hospitalization Progressing     Absence of fever/infection during neutropenic period Progressing        Knowledge Deficit     Patient/family/caregiver demonstrates understanding of disease process, treatment plan, medications, and discharge instructions Progressing        PAIN - ADULT     Verbalizes/displays adequate comfort level or baseline comfort level Progressing        Potential for Falls     Patient will remain free of falls Progressing        Prexisting or High Potential for Compromised Skin Integrity     Skin integrity is maintained or improved Progressing        SAFETY ADULT     Maintain or return to baseline ADL function Progressing     Maintain or return mobility status to optimal level Progressing     Patient will remain free of falls Progressing

## 2017-11-01 ENCOUNTER — GENERIC CONVERSION - ENCOUNTER (OUTPATIENT)
Dept: OTHER | Facility: OTHER | Age: 82
End: 2017-11-01

## 2017-11-01 ENCOUNTER — ALLSCRIPTS OFFICE VISIT (OUTPATIENT)
Dept: OTHER | Facility: OTHER | Age: 82
End: 2017-11-01

## 2017-11-01 DIAGNOSIS — N28.1 ACQUIRED CYST OF KIDNEY: ICD-10-CM

## 2017-11-01 DIAGNOSIS — M19.90 OSTEOARTHRITIS: ICD-10-CM

## 2017-11-01 DIAGNOSIS — M25.552 PAIN IN LEFT HIP: ICD-10-CM

## 2017-11-21 ENCOUNTER — ALLSCRIPTS OFFICE VISIT (OUTPATIENT)
Dept: OTHER | Facility: OTHER | Age: 82
End: 2017-11-21

## 2017-11-21 ENCOUNTER — HOSPITAL ENCOUNTER (OUTPATIENT)
Dept: ULTRASOUND IMAGING | Facility: CLINIC | Age: 82
Discharge: HOME | End: 2017-11-21
Payer: COMMERCIAL

## 2017-11-21 ENCOUNTER — TRANSCRIBE ORDERS (OUTPATIENT)
Dept: RADIOLOGY | Facility: CLINIC | Age: 82
End: 2017-11-21

## 2017-11-21 ENCOUNTER — APPOINTMENT (OUTPATIENT)
Dept: RADIOLOGY | Facility: CLINIC | Age: 82
End: 2017-11-21
Payer: COMMERCIAL

## 2017-11-21 DIAGNOSIS — N28.1 ACQUIRED CYST OF KIDNEY: ICD-10-CM

## 2017-11-21 DIAGNOSIS — M19.90 OSTEOARTHRITIS: ICD-10-CM

## 2017-11-21 DIAGNOSIS — M25.552 PAIN IN LEFT HIP: ICD-10-CM

## 2017-11-21 PROCEDURE — 73502 X-RAY EXAM HIP UNI 2-3 VIEWS: CPT

## 2017-11-21 PROCEDURE — 76770 US EXAM ABDO BACK WALL COMP: CPT

## 2018-01-13 VITALS
WEIGHT: 135.25 LBS | DIASTOLIC BLOOD PRESSURE: 88 MMHG | BODY MASS INDEX: 23.96 KG/M2 | HEART RATE: 68 BPM | SYSTOLIC BLOOD PRESSURE: 160 MMHG | HEIGHT: 63 IN

## 2018-01-13 NOTE — MISCELLANEOUS
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 2) (N28 1)   3  Chronic respiratory failure with hypoxia (518 83,799 02) (J96 11)   4  Hypertension (401 9) (I10)    Plan  Acquired complex cyst of kidney    · US RETROPERITONEAL COMPLETE; Status:Hold For - Scheduling; Requested  for:01Nov2017;    Perform:[facility]; ESV:[id number]; Ordered; For:Acquired complex cyst of kidney; Ordered By:Arik Ballard;  Need for influenza vaccination    · Fluzone High-Dose 0 5 ML Intramuscular Suspension Prefilled Syringe   For: Need for influenza vaccination; Ordered By:Velma Ballard; Effective Date:01Nov2017; Administered by: Aurora Veliz MA: 11/1/2017 1:20:00 PM    Discussion/Summary  Discussion Summary:   1  COPD exacerbation - clinically improved  Finish prednisone taper  Has follow-up with pulmonology after this appointment  Could consider long acting bronchodilator  Continue nebulizers and oxygen as prescribed  2  Chronic hypoxic respiratory failure - 2/2 COPD  Continue O2 as prescribed  3  HTN - BP well controlled  4  Complex right renal cyst - check ultrasound of the kidney    Follow-up in 2-3 months  All hospital records were reviewed  Counseling Documentation With Imm: The patient, patient's family was counseled regarding diagnostic results, instructions for management, risk factor reductions, prognosis, patient and family education, impressions, risks and benefits of treatment options, importance of compliance with treatment  Medication SE Review and Pt Understands Tx: Possible side effects of new medications were reviewed with the patient/guardian today  The treatment plan was reviewed with the patient/guardian   The patient/guardian understands and agrees with the treatment plan   Self Referrals:   Self Referrals: No      Chief Complaint  Chief Complaint Free Text Note Form: hospital follow-up      History of Present Illness  TCM Communication [place]: The patient is being contacted for follow-up after hospitalization and 10/23-1st attempt, no ans, lvm asking pt to return call to schedule FELIX appt  appt scheduled for 11/1/17  Hospital records were reviewed  She was hospitalized at [facility]  The date of admission: 10/14/17, date of discharge: 10/21/17  Diagnosis: SOB/COPD exacerbation  She was discharged with home health services, [facility]  She scheduled a follow up appointment  Communication performed and completed by   HPI: Patient was recently admitted to [facility] with increased SOB and cough  She has underlying COPD on chronic O2 therapy  She is not on any long acting bronchodilators at home  She was treated in hospital for acute tracheobronchitis and COPD exacerbation  She was given IV steroids, azithromycin, nebulizers, and mucinex  She is currently completing prednisone taper  CT chest in the hospital showed: No pulmonary emboli demonstrated  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  Complex right upper renal cyst may be correlated with ultrasound  She feels much better since hospital discharge  She is at her baseline use of oxygen at 3L via [place]  She has been receiving home health services  She denies any fever, chills, nausea, vomiting, abdominal pain  She denies any worsening cough or sputum production  She denies any hematuria or flank pain  Review of Systems  Complete-Female:   Constitutional: No fever, no chills, feels well, no tiredness, no recent weight gain or weight loss  Eyes: eyesight problems, but no eye pain and eyes not red  ENT: hearing loss, but no earache  Cardiovascular: No complaints of slow heart rate, no fast heart rate, no chest pain, no palpitations, no leg claudication, no lower extremity edema  Respiratory: cough and shortness of breath during exertion, but no shortness of breath, no orthopnea, no wheezing and no PND     Gastrointestinal: No complaints of abdominal pain, no constipation, no nausea or vomiting, no diarrhea, no bloody stools  Genitourinary: No complaints of dysuria, no incontinence, no pelvic pain, no dysmenorrhea, no vaginal discharge or bleeding  Musculoskeletal: No complaints of arthralgias, no myalgias, no joint swelling or stiffness, no limb pain or swelling  Integumentary: No complaints of skin rash or lesions, no itching, no skin wounds, no breast pain or lump  Active Problems     1  Allergic rhinitis (477 9) (J30 9)   2  Arteriosclerosis of coronary artery (414 00) (I25 10)   3  Arthritis (716 90) (M19 90)   4  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)   5  Esophagitis, reflux (530 11) (K21 0)   6  Generalized osteoarthritis of multiple sites (715 09) (M15 9)   7  Hyperlipidemia (272 4) (E78 5)   8  Hypertension (401 9) (I10)   9  Lung nodule seen on imaging study (793 11) (R91 1)   10  Need for pneumococcal vaccination (V03 82) (Z23)   11  PVD (peripheral vascular disease) (443 9) (I73 9)   12  Screening for neurological condition (V80 09) (Z13 89)   13  Shortness of breath (786 05) (R06 02)    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)       Screening for genitourinary condition (V81 6) (Z13 89)          Past Medical History    1  History of Atrial Myocardial Infarction - Initial Care ([place]) (410 81)   2  History of cataract (V12 49) (Z86 69)   3  History of crescendo angina (V12 59) (Z86 79)   4  History of hypotension (V12 59) (Z86 79)   5  History of nicotine dependence (V15 82) (Z87 891)   6  History of Limb pain (729 5) (M79 609)   7  History of Macular degeneration of right eye (362 50) (H35 30)   8  Old myocardial infarction (412) (I25 2)    Surgical History    1  History of Appendectomy   2  History of Breast Surgery Lumpectomy   3  History of Cataract Surgery   4  History of Cholecystectomy   5  History of PTCA  Surgical History Reviewed: The surgical history was reviewed and updated today  Family History  Mother    1   Family history of Cancer  Sister    2  Family history of Cancer  Family History    3  Family history of Cancer   4  Family history of Coronary Artery Disease (V17 49)  Family History Reviewed: The family history was reviewed and updated today  Social History    · Former smoker (S39 93) (I91 460)   · Never Drank Alcohol  Social History Reviewed: The social history was reviewed and updated today  Current Meds   1  Albuterol Sulfate (2 5 MG/3ML) 0 083% Inhalation Nebulization Solution; USE 1 VIAL VIA   NEBULIZER EVERY 6 HOURS AS NEEDED; Therapy: 15FYD9004 to (Evaluate:12Feb2017)  Requested for: 82LEA8000; Last   Rx:18Feb2016 Ordered   2  Aspir-81 81 MG Oral Tablet Delayed Release; Take 1 tablet daily Recorded   3  Clopidogrel Bisulfate 75 MG Oral Tablet; take 1 tablet by mouth every day; Therapy: 13OTI7164 to (Evaluate:09Aug2018)  Requested for: 87BWO3759; Last   Rx:14Aug2017 Ordered   4  Metoprolol Succinate ER 50 MG Oral Tablet Extended Release 24 Hour; take 1 tablet by   mouth every day; Therapy: 50WMG5989 to (Evaluate:09Aug2018)  Requested for: 02TLC4226; Last   Rx:14Aug2017 Ordered   5  Nasonex 50 MCG/ACT Nasal Suspension; USE 2 SPRAYS IN EACH NOSTRIL ONCE   DAILY; Therapy: 46FWR4745 to (Last Rx:07Mar2014)  Requested for: 63BVJ4384 Ordered   6  Nitroglycerin 0 4 MG Sublingual Tablet Sublingual; DISSOLVE 1 TABLET UNDER THE   TONGUE AS NEEDED FOR CHEST PAIN Recorded   7  Pantoprazole Sodium 40 MG Oral Tablet Delayed Release; TAKE 1 TABLET BY MOUTH   30 MINUTES BEFORE DINNER ONCE DAILY; Therapy: 82CUV0534 to (Evaluate:14Oct2017)  Requested for: 51JOD6148; Last   Rx:16Jun2017 Ordered   8  Rosuvastatin Calcium 10 MG Oral Tablet; take 1 tablet by mouth every day; Therapy: 51Oqa5618 to (Evaluate:09Aug2018)  Requested for: 08BEE2575; Last   Rx:14Aug2017 Ordered   9   TraMADol HCl - 50 MG Oral Tablet; TAKE 1 TABLET TWICE DAILY AS NEEDED FOR   SEVERE PAIN;   Therapy: 12Oct2016 to (Last Rx:12Oct2016) Ordered  Medication List Reviewed: The medication list was reviewed and updated today  Allergies    1  Amoxil TABS   2  Penicillins    Vitals  Signs   Recorded: 98NBQ4793 01:08PM   Heart Rate: 81  Systolic: 098, LUE, Sitting  Diastolic: 76, LUE, Sitting  Height: 5 ft 2 5 in  Weight: 136 lb 8 0 oz  BMI Calculated: 24 57  BSA Calculated: 1 63  O2 Saturation: 94    Physical Exam    Constitutional   General appearance: No acute distress, well appearing and well nourished  Ears, Nose, Mouth, and Throat   Oropharynx: Normal with no erythema, edema, exudate or lesions  Pulmonary   Respiratory effort: No increased work of breathing or signs of respiratory distress  Auscultation of lungs: Clear to auscultation  Cardiovascular   Auscultation of heart: Normal rate and rhythm, normal S1 and S2, without murmurs  Examination of extremities for edema and/or varicosities: Normal     Carotid pulses: Normal     Abdomen   Abdomen: Non-tender, no masses  Liver and spleen: No hepatomegaly or splenomegaly  Lymphatic   Palpation of lymph nodes in neck: No lymphadenopathy      Psychiatric   Orientation to person, place, and time: Normal     Mood and affect: Normal          Signatures   Electronically signed by : Twyla Hadley DO; Nov 1 2017  1:45PM EST                       (Author)

## 2018-01-14 VITALS
BODY MASS INDEX: 24.19 KG/M2 | SYSTOLIC BLOOD PRESSURE: 122 MMHG | HEART RATE: 81 BPM | WEIGHT: 136.5 LBS | DIASTOLIC BLOOD PRESSURE: 76 MMHG | OXYGEN SATURATION: 94 % | HEIGHT: 63 IN

## 2018-01-14 NOTE — PROGRESS NOTES
Assessment    1  Medicare annual wellness visit, initial (V70 0) (Z00 00)   2  Left hip pain (719 45) (M25 552)    Plan  Arthritis, Left hip pain    · * XR HIP/PELV 2-3 VWS LEFT W PELVIS IF PERFORMED; Status:Active; Requested  VD:82NDB4063; Health Maintenance    · There are many exercise options for seniors ; Status:Complete;   Done: 61ZRI8836   · We would like you to start exercises to build muscle strength and keep your joints loose ;  Status:Complete;   Done: 44KKK8328  Left hip pain    · FentaNYL 25 MCG/HR Transdermal Patch 72 Hour; APPLY 1 PATCH EVERY 3  DAYS    Discussion/Summary  Impression: Initial Annual Wellness Visit  Cardiovascular screening and counseling: the risks and benefits of screening were discussed and screening is current  Diabetes screening and counseling: the risks and benefits of screening were discussed and screening is current  Colorectal cancer screening and counseling: the risks and benefits of screening were discussed and screening not indicated  Breast cancer screening and counseling: the risks and benefits of screening were discussed and screening not indicated  Cervical cancer screening and counseling: the risks and benefits of screening were discussed and screening not indicated  Osteoporosis screening and counseling: the risks and benefits of screening were discussed, screening is current, counseling was given on obtaining adequate amounts of calcium and vitamin D on a daily basis and counseling was given on the importance of regular weightbearing exercise  Glaucoma screening and counseling: the risks and benefits of screening were discussed and screening is current   Immunizations: the risks and benefits of influenza vaccination were discussed with the patient, influenza vaccine is up to date this year, influenza vaccination is recommended annually, the risks and benefits of pneumococcal vaccination were discussed with the patient and the lifetime pneumococcal vaccine has been completed  Advance Directive Planning: complete and up to date, she was encouraged to follow-up with me to discuss her questions and/or decisions  Patient Discussion: plan discussed with the patient, follow-up visit needed in one year  Chief Complaint  medicare wellness      History of Present Illness  Welcome to Medicare and Wellness Visits: The patient is being seen for the initial annual wellness visit  Medicare Screening and Risk Factors   Hospitalizations: she has been previously hospitalizied and COPD  Medicare Screening Tests Risk Questions   Abdominal aortic aneurysm risk assessment: tobacco use and quit 2 years, but none indicated  Osteoporosis risk assessment: , female gender and over 48years of age  Drug and Alcohol Use: The patient is a former cigarette smoker and quit smoking 2 years  She is ready to quit using tobacco  The patient reports rare alcohol use  She has never used illicit drugs  Diet and Physical Activity: Current diet includes well balanced meals, 2 servings of fruit per day, 2 servings of vegetables per day, 1 servings of meat per day, 1 servings of whole grains per day and 3 cups of coffee per day  She is sedentary  Mood Disorder and Cognitive Impairment Screening: PHQ-9 Depression Scale   Over the past 2 weeks, how often have you been bothered by the following problems? 1 ) Little interest or pleasure in doing things? Several days  2 ) Feeling down, depressed or hopeless? Not at all    3 ) Trouble falling asleep or sleeping too much? Half the days or more  4 ) Feeling tired or having little energy? Half the days or more  5 ) Poor appetite or overeating? Not at all    6 ) Feeling bad about yourself, or that you are a failure, or have let yourself or your family down? Several days     8 ) Moving or speaking so slowly that other people could have noticed, or the opposite, moving or speaking faster than usual? Not at all    9 ) Thoughts that you would be off dead or of hurting yourself in some way? Not at all  Functional Ability/Level of Safety: Hearing is slightly decreased  She does not use a hearing aid  The patient is currently able to do activities of daily living with limitations, able to participate in social activities with limitations and unable to drive  Activities of daily living details: needs help using the phone, transportation help needed, meal preparation help needed, needs help doing housework, needs help doing laundry and needs help managing medications  Fall risk factors: The patient fell 0 times in the past 12 months  Injury History: mobility impairment, visual impairment and up and go test was unsteady or greater than thirty seconds  Advance Directives: Advance directives: living will, durable power of  for health care directives and advance directives  Co-Managers and Medical Equipment/Suppliers: See Patient Care Team      Patient Care Team    Care Team Member Role Specialty Office Number   [place] [facility]  Cardiology [phone]   [place] [facility]  Pulmonary Medicine [phone]     Active Problems    1  Acquired complex cyst of kidney (593 2) (N28 1)   2  Active advance directive (V49 89) (Z78 9)   3  Allergic rhinitis (477 9) (J30 9)   4  Arteriosclerosis of coronary artery (414 00) (I25 10)   5  Arthritis (716 90) (M19 90)   6  Bilateral hearing loss (389 9) (H91 93)   7  Centrilobular emphysema (492 8) (J43 2)   8  Chronic respiratory failure with hypoxia (518 83,799 02) (J96 11)   9  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)   10  Esophagitis, reflux (530 11) (K21 0)   11  Gait disturbance (781 2) (R26 9)   12  Generalized osteoarthritis of multiple sites (715 09) (M15 9)   13  Hyperlipidemia (272 4) (E78 5)   14  Hypertension (401 9) (I10)   15  Legally blind (369 4) (H54 8)   16  Lung nodule seen on imaging study (793 11) (R91 1)   17  Need for influenza vaccination (V04 81) (Z23)   18   Need for pneumococcal vaccination (V03 82) (Z23)   19  PVD (peripheral vascular disease) (443 9) (I73 9)   20  Screening for genitourinary condition (V81 6) (Z13 89)   21  Screening for neurological condition (V80 09) (Z13 89)   22  Shortness of breath (786 05) (R06 02)    Past Medical History    · History of Atrial Myocardial Infarction - Initial Care ([place]) (410 81)   · History of cataract (V12 49) (Z86 69)   · History of crescendo angina (V12 59) (Z86 79)   · History of hypotension (V12 59) (Z86 79)   · History of nicotine dependence (V15 82) (Z87 891)   · History of Limb pain (729 5) (M79 609)   · History of Macular degeneration of right eye (362 50) (H35 30)   · Old myocardial infarction (412) (I25 2)   · History of Screening for genitourinary condition (V81 6) (Z13 89)    The active problems and past medical history were reviewed and updated today  Surgical History    · History of Appendectomy   · History of Breast Surgery Lumpectomy   · History of Cataract Surgery   · History of Cholecystectomy   · History of PTCA    The surgical history was reviewed and updated today  Family History  Mother    · Family history of Cancer  Sister    · Family history of Cancer  Family History    · Family history of Cancer   · Family history of Coronary Artery Disease (V17 49)    The family history was reviewed and updated today  Social History    · Active advance directive (V49 89) (Z78 9)   · Former smoker (Y32 07) (T92 958)   · Never Drank Alcohol  The social history was reviewed and updated today  Current Meds   1  Albuterol Sulfate (2 5 MG/3ML) 0 083% Inhalation Nebulization Solution; USE 1 VIAL   VIA NEBULIZER EVERY 6 HOURS AS NEEDED; Therapy: 39HXL1112 to (Evaluate:09Tca4578)  Requested for: 28QQA5047; Last   Rx:40Xye3876 Ordered   2  Aspir-81 81 MG Oral Tablet Delayed Release; Take 1 tablet daily Recorded   3  Clopidogrel Bisulfate 75 MG Oral Tablet; take 1 tablet by mouth every day;    Therapy: 68NFW6332 to (Evaluate:09Aug2018)  Requested for: 45KTB9204; Last   Rx:14Aug2017 Ordered   4  Incruse Ellipta 62 5 MCG/INH Inhalation Aerosol Powder Breath Activated; USE 1   INHALATION ONCE DAILY; Therapy: 39TGK4914 to (Evaluate:01Dcg4831); Last Rx:01Nov2017 Ordered   5  Metoprolol Succinate ER 50 MG Oral Tablet Extended Release 24 Hour; take 1 tablet by   mouth every day; Therapy: 14XSE2730 to (Evaluate:09Aug2018)  Requested for: 61MWP5291; Last   Rx:14Aug2017 Ordered   6  Nitroglycerin 0 4 MG Sublingual Tablet Sublingual; DISSOLVE 1 TABLET UNDER THE   TONGUE AS NEEDED FOR CHEST PAIN Recorded   7  Pantoprazole Sodium 40 MG Oral Tablet Delayed Release; TAKE 1 TABLET BY MOUTH   30 MINUTES BEFORE DINNER ONCE DAILY; Therapy: 87BFZ8177 to (Evaluate:14Oct2017)  Requested for: 63ANO8525; Last   Rx:16Jun2017 Ordered   8  Rosuvastatin Calcium 10 MG Oral Tablet; take 1 tablet by mouth every day; Therapy: 55Nmm2130 to (Evaluate:09Aug2018)  Requested for: 53WBM6701; Last   Rx:14Aug2017 Ordered   9  Spiriva Respimat 1 25 MCG/ACT Inhalation Aerosol Solution; INHALE 2 INHALATIONS   BY MOUTH DAILY; Therapy: 99AYW1839 to (Last Rx:01Nov2017) Ordered    The medication list was reviewed and updated today  Allergies    1  Amoxil TABS   2  Penicillins    3  Other    Immunizations   1 2 3    Influenza  29-Dec-2010 26-Oct-2011 01-Nov-2017    PCV  12-Oct-2016      Pneumo Other  28-Feb-2006 26-Oct-2011      Vitals  Signs    Heart Rate: 62  Systolic: 774, LUE, Sitting  Diastolic: 62, LUE, Sitting  O2 Saturation: 94    Results/Data  PHQ-9 Adult Depression Screening 21Nov2017 08:49AM User, s     Test Name Result Flag Reference   PHQ-9 Adult Depression Score 6     Over the last two weeks, how often have you been bothered by any of the following problems?   Little interest or pleasure in doing things: Several days - 1  Feeling down, depressed, or hopeless: Not at all - 0  Trouble falling or staying asleep, or sleeping too much: More than half the days - 2  Feeling tired or having little energy: More than half the days - 2  Poor appetite or over eating: Not at all - 0  Feeling bad about yourself - or that you are a failure or have let yourself or your family down: Several days - 1  Trouble concentrating on things, such as reading the newspaper or watching television: Not at all - 0  Moving or speaking so slowly that other people could have noticed  Or the opposite -  being so fidgety or restless that you have been moving around a lot more than usual: Not at all - 0  Thoughts that you would be better off dead, or of hurting yourself in some way: Not at all - 0   PHQ-9 Adult Depression Screening Negative     PHQ-9 Difficulty Level Not difficult at all     PHQ-9 Severity Mild Depression       Falls Risk Assessment (Dx Z13 89 Screen for Neurologic Disorder) 10XMW6607 08:49AM User, Ahs     Test Name Result Flag Reference   Falls Risk      No falls in the past year       Future Appointments    Date/Time Provider Specialty Site   02/13/2018 10:15 AM MORGAN Shirley   Pulmonary Medicine [facility]     Signatures   Electronically signed by : Philippe Cruz DO; Nov 21 2017  9:30AM EST                       (Author)

## 2018-01-15 VITALS
WEIGHT: 133 LBS | SYSTOLIC BLOOD PRESSURE: 162 MMHG | DIASTOLIC BLOOD PRESSURE: 78 MMHG | HEIGHT: 63 IN | BODY MASS INDEX: 23.57 KG/M2 | HEART RATE: 68 BPM

## 2018-01-22 VITALS
DIASTOLIC BLOOD PRESSURE: 82 MMHG | OXYGEN SATURATION: 94 % | SYSTOLIC BLOOD PRESSURE: 130 MMHG | HEIGHT: 63 IN | BODY MASS INDEX: 24.1 KG/M2 | WEIGHT: 136 LBS

## 2018-02-27 DIAGNOSIS — R05.9 COUGH: Primary | ICD-10-CM

## 2018-02-27 RX ORDER — BENZONATATE 100 MG/1
CAPSULE ORAL
Qty: 60 CAPSULE | Refills: 1 | Status: SHIPPED | OUTPATIENT
Start: 2018-02-27 | End: 2018-09-09 | Stop reason: SDUPTHER

## 2018-03-02 DIAGNOSIS — R12 HEARTBURN: ICD-10-CM

## 2018-03-02 DIAGNOSIS — R12 HEARTBURN: Primary | ICD-10-CM

## 2018-03-02 RX ORDER — PANTOPRAZOLE SODIUM 40 MG/1
TABLET, DELAYED RELEASE ORAL
Qty: 90 TABLET | Refills: 3 | Status: SHIPPED | OUTPATIENT
Start: 2018-03-02 | End: 2018-09-06 | Stop reason: SDUPTHER

## 2018-03-02 RX ORDER — PANTOPRAZOLE SODIUM 40 MG/1
40 TABLET, DELAYED RELEASE ORAL DAILY
Qty: 30 TABLET | Refills: 3 | Status: SHIPPED | OUTPATIENT
Start: 2018-03-02 | End: 2018-03-02 | Stop reason: SDUPTHER

## 2018-03-14 DIAGNOSIS — M25.552 LEFT HIP PAIN: Primary | ICD-10-CM

## 2018-03-14 RX ORDER — FENTANYL 25 UG/H
1 PATCH TRANSDERMAL
Qty: 10 PATCH | Refills: 0 | Status: SHIPPED | OUTPATIENT
Start: 2018-03-14 | End: 2018-05-14 | Stop reason: SDUPTHER

## 2018-03-14 RX ORDER — FENTANYL 25 UG/H
1 PATCH TRANSDERMAL
COMMUNITY
Start: 2017-11-21 | End: 2018-03-14 | Stop reason: SDUPTHER

## 2018-03-14 NOTE — TELEPHONE ENCOUNTER
Pts daughter cld asking to have pts FENTANYL 25mg patch refill sent to [place]  I do not see it on the med list, but said we filled it in January

## 2018-03-26 ENCOUNTER — PATIENT OUTREACH (OUTPATIENT)
Dept: FAMILY MEDICINE CLINIC | Facility: CLINIC | Age: 83
End: 2018-03-26

## 2018-04-04 ENCOUNTER — PATIENT OUTREACH (OUTPATIENT)
Dept: FAMILY MEDICINE CLINIC | Facility: CLINIC | Age: 83
End: 2018-04-04

## 2018-04-04 NOTE — PROGRESS NOTES
Multiple attempts made to contact patient to follow up on health and outreach on any possible needed services  Voicemail left x3 with no return call  Will close from care coordination at this time

## 2018-05-14 ENCOUNTER — TELEPHONE (OUTPATIENT)
Dept: INTERNAL MEDICINE CLINIC | Facility: CLINIC | Age: 83
End: 2018-05-14

## 2018-05-14 DIAGNOSIS — M25.552 LEFT HIP PAIN: ICD-10-CM

## 2018-05-14 RX ORDER — FENTANYL 25 UG/H
1 PATCH TRANSDERMAL
Qty: 10 PATCH | Refills: 0 | Status: SHIPPED | OUTPATIENT
Start: 2018-05-14 | End: 2018-06-27 | Stop reason: SDUPTHER

## 2018-06-11 ENCOUNTER — TELEPHONE (OUTPATIENT)
Dept: CARDIOLOGY CLINIC | Facility: CLINIC | Age: 83
End: 2018-06-11

## 2018-06-11 NOTE — TELEPHONE ENCOUNTER
LVM, for patient to give us a call back to schedule an appointment with Dr Senia Rodríguez in 3 to 4 weeks

## 2018-06-27 DIAGNOSIS — M25.552 LEFT HIP PAIN: ICD-10-CM

## 2018-06-27 RX ORDER — FENTANYL 25 UG/H
1 PATCH TRANSDERMAL
Qty: 10 PATCH | Refills: 0 | Status: SHIPPED | OUTPATIENT
Start: 2018-06-27 | End: 2018-08-13 | Stop reason: SDUPTHER

## 2018-06-27 NOTE — TELEPHONE ENCOUNTER
Patient needs new RX for Fentanyl patch    If this can now be sent to pharmacy, she would rather that be done    But if she still needs to p/u the RX  [facility] She will do that    Please call daughter either way so she knows what to do

## 2018-07-19 ENCOUNTER — OFFICE VISIT (OUTPATIENT)
Dept: CARDIOLOGY CLINIC | Facility: CLINIC | Age: 83
End: 2018-07-19
Payer: COMMERCIAL

## 2018-07-19 VITALS
DIASTOLIC BLOOD PRESSURE: 76 MMHG | HEART RATE: 71 BPM | HEIGHT: 62 IN | OXYGEN SATURATION: 97 % | SYSTOLIC BLOOD PRESSURE: 104 MMHG

## 2018-07-19 DIAGNOSIS — R53.83 FATIGUE, UNSPECIFIED TYPE: ICD-10-CM

## 2018-07-19 DIAGNOSIS — I10 ESSENTIAL HYPERTENSION: Chronic | ICD-10-CM

## 2018-07-19 DIAGNOSIS — E78.2 MIXED HYPERLIPIDEMIA: ICD-10-CM

## 2018-07-19 DIAGNOSIS — R06.02 SOB (SHORTNESS OF BREATH): ICD-10-CM

## 2018-07-19 DIAGNOSIS — I25.10 CORONARY ARTERY DISEASE INVOLVING NATIVE CORONARY ARTERY OF NATIVE HEART WITHOUT ANGINA PECTORIS: Primary | Chronic | ICD-10-CM

## 2018-07-19 PROCEDURE — 99214 OFFICE O/P EST MOD 30 MIN: CPT | Performed by: INTERNAL MEDICINE

## 2018-07-19 PROCEDURE — 4040F PNEUMOC VAC/ADMIN/RCVD: CPT | Performed by: INTERNAL MEDICINE

## 2018-07-19 NOTE — PROGRESS NOTES
Cardiology Follow Up    Angeli Bañuelos  5/12/1930  [id number]  [id number]  [facility] [place] Jozef [place] [id number]    1  Coronary artery disease involving native coronary artery of native heart without angina pectoris  Echo complete with contrast if indicated   2  Essential hypertension     3  Fatigue, unspecified type  CBC and Platelet    NT-BNP PRO    TSH, 3rd generation with T4 reflex    Basic metabolic panel    CK (with reflex to MB)   4  SOB (shortness of breath)  CBC and Platelet    NT-BNP PRO    TSH, 3rd generation with T4 reflex    Basic metabolic panel    Echo complete with contrast if indicated   5  Mixed hyperlipidemia       Chief Complaint   Patient presents with    Follow-up     Former [facility] patient    Fatigue         Interval History:  Patient presents with complaints of fatigue and increased SOB  (does have history of COPD on supplemental O2 for the last 3 years)  She is former patient of Dr Mary Ann Wood  She states she is also bothered by arthritis which causes her significant pain     She has history of multiple MI-- at that time had severe CP  Denies recurrence of these symptoms--no chest pain/pressure/tightness  Denies palpitations, dizziness, edema    Patient Active Problem List   Diagnosis    HTN (hypertension)    CAD (coronary artery disease)    Hyperlipidemia     Past Medical History:   Diagnosis Date    Cardiac disease     [facility]     Hyperlipidemia     Hypertension     [facility]     [facility]VD [facility]ipheral vascular disease) ([facility] )     Stroke ([facility] )      Social History     Social History    Marital status:      Spouse name: N/A    Number of children: N/A    Years of education: N/A     Occupational History    Not on file       Social History Main Topics    Smoking status: Former Smoker    Smokeless tobacco: Former User    Alcohol use No    Drug use: No    Sexual activity: Not on file     Other Topics Concern    Not on file     Social History Narrative    No narrative on file      History reviewed  No pertinent family history    Past Surgical History:   Procedure Laterality Date    APPENDECTOMY      BREAST SURGERY      CHOLECYSTECTOMY      CORONARY ANGIOPLASTY      ESOPHAGUS FOREIGN BODY REMOVAL N/A 6/13/2017    Procedure: REMOVAL FOREIGN BODY ESOPHAGUS;  Surgeon: Trudi Barnett MD;  Location: [facility];  Service: Gastroenterology       Current Outpatient Prescriptions:     aspirin 81 mg chewable tablet, Chew 81 mg daily, Disp: , Rfl:     benzonatate (TESSALON PERLES) 100 mg capsule, TAKE 1 CAPSULE BY MOUTH THREE TIMES DAILY AS NEEDED FOR COUGH, Disp: 60 capsule, Rfl: 1    clopidogrel (PLAVIX) 75 mg tablet, Take 75 mg by mouth daily, Disp: , Rfl:     fentaNYL (DURAGESIC) 25 mcg/hr, Place 1 patch on the skin every 3 (three) days for 30 days, Disp: 10 patch, Rfl: 0    metoprolol tartrate (LOPRESSOR) 25 mg tablet, Take 25 mg by mouth 2 (two) times a day, Disp: , Rfl:     pantoprazole (PROTONIX) 40 mg tablet, TAKE 1 TABLET(40 MG) BY MOUTH DAILY, Disp: 90 tablet, Rfl: 3    rosuvastatin (CRESTOR) 10 MG tablet, Take 10 mg by mouth daily, Disp: , Rfl:     albuterol (PROVENTIL HFA,VENTOLIN HFA) 90 mcg/act inhaler, Inhale 2 puffs every 6 (six) hours as needed for wheezing, Disp: 1 Inhaler, Rfl: 0    guaiFENesin (MUCINEX) 600 mg 12 hr tablet, Take 1 tablet by mouth every 12 (twelve) hours, Disp: , Rfl: 0    tiotropium (SPIRIVA RESPIMAT) 1 25 MCG/ACT AERS inhaler, Inhale daily, Disp: , Rfl:     umeclidinium bromide (INCRUSE ELLIPTA) 62 5 mcg/inh AEPB inhaler, Inhale daily, Disp: , Rfl:   Allergies   Allergen Reactions    Amoxicillin Other (See Comments)     unsure    Penicillins Other (See Comments)     unsure           Review of Systems:  /76   Pulse 71   Ht 5' 2" (1 575 m)   LMP  (LMP Unknown)   SpO2 97%     Review of Systems Constitutional: Positive for fatigue  Negative for activity change, diaphoresis, fever and unexpected weight change  HENT: Negative for nosebleeds and trouble swallowing  Eyes: Negative for visual disturbance  Respiratory: Positive for shortness of breath  Negative for cough, chest tightness and wheezing  Cardiovascular: Negative for chest pain, palpitations and leg swelling  Gastrointestinal: Negative for abdominal pain, anal bleeding, blood in stool and nausea  Endocrine: Negative for cold intolerance and heat intolerance  Genitourinary: Negative for decreased urine volume, frequency and hematuria  Musculoskeletal: Positive for arthralgias  Negative for myalgias  Skin: Negative for color change and wound  Neurological: Negative for dizziness, syncope, weakness, light-headedness and headaches  Psychiatric/Behavioral: Negative for sleep disturbance  The patient is not nervous/anxious  Physical Exam:  Physical Exam   Constitutional: She is oriented to person, place, and time  She appears well-developed and well-nourished  No distress  HENT:   Head: Normocephalic and atraumatic  Eyes: Conjunctivae are normal  No scleral icterus  Neck: Neck supple  No JVD present  Cardiovascular: Normal rate and regular rhythm  No murmur heard  Pulmonary/Chest: Effort normal  No respiratory distress  She has decreased breath sounds  She has no wheezes  She has no rales  Abdominal: She exhibits no distension  There is no tenderness  Musculoskeletal: She exhibits no edema  Neurological: She is alert and oriented to person, place, and time  Skin: Skin is warm and dry  No rash noted  She is not diaphoretic  Psychiatric: She has a normal mood and affect         Discussion/Summary:    Fatigue/shortness of breath  -may be related to progression pulmonary disease, however will obtain lab including CBC, TSH, BMP, BNP to evaluate for any presence of anemia, thyroid dysfunction, CHF  -will also obtain echocardiogram to evaluate LVEF motion as this has not evaluated in the recent past     CAD s/p PCI   -no symptoms similar to previous angina, however patient is reporting some shortness of breath  -patient would not likely be candidate for any invasive evaluation, however will obtain echocardiogram as above  -on aspirin, Plavix, metoprolol, Crestor    Hypertension-BP stable    Hyperlipidemia-on Crestor    Signs and symptoms to watch out for from cardiac standpoint reviewed  Patient report any concerns or questions  Follow-up with PCP Dr Eliz Zimmerman   Follow-up in 6 months or sooner if needed

## 2018-08-13 ENCOUNTER — TELEPHONE (OUTPATIENT)
Dept: INTERNAL MEDICINE CLINIC | Facility: CLINIC | Age: 83
End: 2018-08-13

## 2018-08-13 DIAGNOSIS — M25.552 LEFT HIP PAIN: ICD-10-CM

## 2018-08-13 RX ORDER — FENTANYL 25 UG/H
1 PATCH TRANSDERMAL
Qty: 10 PATCH | Refills: 0 | Status: SHIPPED | OUTPATIENT
Start: 2018-08-13 | End: 2018-10-08 | Stop reason: SDUPTHER

## 2018-08-14 DIAGNOSIS — I25.119 CORONARY ARTERY DISEASE WITH ANGINA PECTORIS, UNSPECIFIED VESSEL OR LESION TYPE, UNSPECIFIED WHETHER NATIVE OR TRANSPLANTED HEART (HCC): ICD-10-CM

## 2018-08-14 DIAGNOSIS — I25.119 CORONARY ARTERY DISEASE WITH ANGINA PECTORIS, UNSPECIFIED VESSEL OR LESION TYPE, UNSPECIFIED WHETHER NATIVE OR TRANSPLANTED HEART (HCC): Primary | ICD-10-CM

## 2018-08-14 RX ORDER — NITROGLYCERIN 0.4 MG/1
0.4 TABLET SUBLINGUAL
Qty: 90 TABLET | Refills: 3 | Status: SHIPPED | OUTPATIENT
Start: 2018-08-14 | End: 2018-08-14 | Stop reason: SDUPTHER

## 2018-08-14 RX ORDER — NITROGLYCERIN 0.4 MG/1
TABLET SUBLINGUAL
Qty: 75 TABLET | Refills: 3 | Status: SHIPPED | OUTPATIENT
Start: 2018-08-14 | End: 2019-03-15 | Stop reason: ALTCHOICE

## 2018-08-14 NOTE — TELEPHONE ENCOUNTER
Patient needs RX for nitroglycerine tabs   4 mg sublingual PRN    The ones she has are about to   Eva Bell

## 2018-09-06 DIAGNOSIS — R12 HEARTBURN: ICD-10-CM

## 2018-09-06 RX ORDER — PANTOPRAZOLE SODIUM 40 MG/1
40 TABLET, DELAYED RELEASE ORAL DAILY
Qty: 90 TABLET | Refills: 3 | Status: SHIPPED | OUTPATIENT
Start: 2018-09-06 | End: 2019-03-15 | Stop reason: CLARIF

## 2018-09-09 DIAGNOSIS — R05.9 COUGH: ICD-10-CM

## 2018-09-09 RX ORDER — BENZONATATE 100 MG/1
CAPSULE ORAL
Qty: 60 CAPSULE | Refills: 0 | Status: SHIPPED | OUTPATIENT
Start: 2018-09-09 | End: 2019-03-15 | Stop reason: ALTCHOICE

## 2018-09-11 DIAGNOSIS — I25.10 CORONARY ARTERY DISEASE INVOLVING NATIVE CORONARY ARTERY OF NATIVE HEART WITHOUT ANGINA PECTORIS: ICD-10-CM

## 2018-09-11 DIAGNOSIS — E78.2 MIXED HYPERLIPIDEMIA: Primary | ICD-10-CM

## 2018-09-11 DIAGNOSIS — I10 ESSENTIAL HYPERTENSION: ICD-10-CM

## 2018-09-11 RX ORDER — CLOPIDOGREL BISULFATE 75 MG/1
75 TABLET ORAL DAILY
Qty: 90 TABLET | Refills: 3 | Status: SHIPPED | OUTPATIENT
Start: 2018-09-11 | End: 2019-03-15 | Stop reason: ALTCHOICE

## 2018-09-11 RX ORDER — ROSUVASTATIN CALCIUM 10 MG/1
10 TABLET, COATED ORAL DAILY
Qty: 90 TABLET | Refills: 3 | Status: SHIPPED | OUTPATIENT
Start: 2018-09-11 | End: 2019-03-15 | Stop reason: ALTCHOICE

## 2018-10-08 DIAGNOSIS — M25.552 LEFT HIP PAIN: ICD-10-CM

## 2018-10-08 RX ORDER — FENTANYL 25 UG/H
1 PATCH TRANSDERMAL
Qty: 10 PATCH | Refills: 0 | Status: SHIPPED | OUTPATIENT
Start: 2018-10-08 | End: 2018-11-07

## 2018-12-20 ENCOUNTER — TELEPHONE (OUTPATIENT)
Dept: INTERNAL MEDICINE CLINIC | Facility: CLINIC | Age: 83
End: 2018-12-20

## 2018-12-21 PROBLEM — R26.9 GAIT DISTURBANCE: Status: ACTIVE | Noted: 2017-11-01

## 2018-12-21 PROBLEM — H91.93 BILATERAL HEARING LOSS: Status: ACTIVE | Noted: 2017-11-01

## 2018-12-21 PROBLEM — H54.8 LEGALLY BLIND: Status: ACTIVE | Noted: 2017-11-01

## 2019-03-15 ENCOUNTER — APPOINTMENT (OUTPATIENT)
Dept: CT IMAGING | Facility: HOSPITAL | Age: 84
End: 2019-03-15
Payer: COMMERCIAL

## 2019-03-15 ENCOUNTER — APPOINTMENT (OUTPATIENT)
Dept: NUCLEAR MEDICINE | Facility: HOSPITAL | Age: 84
End: 2019-03-15
Payer: COMMERCIAL

## 2019-03-15 ENCOUNTER — HOSPITAL ENCOUNTER (OUTPATIENT)
Facility: HOSPITAL | Age: 84
Setting detail: OBSERVATION
Discharge: NURSING, REHAB OR LONG-TERM CARE | End: 2019-03-16
Attending: EMERGENCY MEDICINE | Admitting: HOSPITALIST
Payer: COMMERCIAL

## 2019-03-15 ENCOUNTER — APPOINTMENT (OUTPATIENT)
Dept: NON INVASIVE DIAGNOSTICS | Facility: HOSPITAL | Age: 84
End: 2019-03-15
Payer: COMMERCIAL

## 2019-03-15 ENCOUNTER — APPOINTMENT (EMERGENCY)
Dept: RADIOLOGY | Facility: HOSPITAL | Age: 84
End: 2019-03-15
Payer: COMMERCIAL

## 2019-03-15 DIAGNOSIS — R11.0 NAUSEA: ICD-10-CM

## 2019-03-15 DIAGNOSIS — G89.29 CHRONIC PAIN: ICD-10-CM

## 2019-03-15 DIAGNOSIS — R07.9 CHEST PAIN, UNSPECIFIED TYPE: Primary | ICD-10-CM

## 2019-03-15 DIAGNOSIS — R79.89 ELEVATED D-DIMER: ICD-10-CM

## 2019-03-15 LAB
ALBUMIN SERPL BCP-MCNC: 3.3 G/DL (ref 3.5–5)
ALP SERPL-CCNC: 71 U/L (ref 46–116)
ALT SERPL W P-5'-P-CCNC: 16 U/L (ref 12–78)
ANION GAP SERPL CALCULATED.3IONS-SCNC: 5 MMOL/L (ref 4–13)
APTT PPP: 29 SECONDS (ref 26–38)
AST SERPL W P-5'-P-CCNC: 18 U/L (ref 5–45)
ATRIAL RATE: 103 BPM
ATRIAL RATE: 79 BPM
ATRIAL RATE: 79 BPM
BACTERIA UR QL AUTO: ABNORMAL /HPF
BASOPHILS # BLD AUTO: 0.06 THOUSANDS/ΜL (ref 0–0.1)
BASOPHILS NFR BLD AUTO: 1 % (ref 0–1)
BILIRUB SERPL-MCNC: 0.2 MG/DL (ref 0.2–1)
BILIRUB UR QL STRIP: NEGATIVE
BUN SERPL-MCNC: 16 MG/DL (ref 5–25)
CALCIUM SERPL-MCNC: 9.4 MG/DL (ref 8.3–10.1)
CHLORIDE SERPL-SCNC: 98 MMOL/L (ref 100–108)
CLARITY UR: CLEAR
CO2 SERPL-SCNC: 36 MMOL/L (ref 21–32)
COLOR UR: YELLOW
CREAT SERPL-MCNC: 0.65 MG/DL (ref 0.6–1.3)
DEPRECATED D DIMER PPP: 1846 NG/ML (FEU)
EOSINOPHIL # BLD AUTO: 0.18 THOUSAND/ΜL (ref 0–0.61)
EOSINOPHIL NFR BLD AUTO: 2 % (ref 0–6)
ERYTHROCYTE [DISTWIDTH] IN BLOOD BY AUTOMATED COUNT: 12.3 % (ref 11.6–15.1)
GFR SERPL CREATININE-BSD FRML MDRD: 80 ML/MIN/1.73SQ M
GLUCOSE SERPL-MCNC: 126 MG/DL (ref 65–140)
GLUCOSE UR STRIP-MCNC: NEGATIVE MG/DL
HCT VFR BLD AUTO: 42.9 % (ref 34.8–46.1)
HGB BLD-MCNC: 13.1 G/DL (ref 11.5–15.4)
HGB UR QL STRIP.AUTO: ABNORMAL
HYALINE CASTS #/AREA URNS LPF: ABNORMAL /LPF
IMM GRANULOCYTES # BLD AUTO: 0.06 THOUSAND/UL (ref 0–0.2)
IMM GRANULOCYTES NFR BLD AUTO: 1 % (ref 0–2)
INR PPP: 1.03 (ref 0.86–1.17)
KETONES UR STRIP-MCNC: NEGATIVE MG/DL
LACTATE SERPL-SCNC: 1.6 MMOL/L (ref 0.5–2)
LEUKOCYTE ESTERASE UR QL STRIP: ABNORMAL
LYMPHOCYTES # BLD AUTO: 2.1 THOUSANDS/ΜL (ref 0.6–4.47)
LYMPHOCYTES NFR BLD AUTO: 19 % (ref 14–44)
MAX DIASTOLIC BP: 75 MMHG
MAX HEART RATE: 162 BPM
MAX PREDICTED HEART RATE: 132 BPM
MAX. SYSTOLIC BP: 159 MMHG
MCH RBC QN AUTO: 30.3 PG (ref 26.8–34.3)
MCHC RBC AUTO-ENTMCNC: 30.5 G/DL (ref 31.4–37.4)
MCV RBC AUTO: 99 FL (ref 82–98)
MONOCYTES # BLD AUTO: 0.67 THOUSAND/ΜL (ref 0.17–1.22)
MONOCYTES NFR BLD AUTO: 6 % (ref 4–12)
MUCOUS THREADS UR QL AUTO: ABNORMAL
NEUTROPHILS # BLD AUTO: 7.78 THOUSANDS/ΜL (ref 1.85–7.62)
NEUTS SEG NFR BLD AUTO: 71 % (ref 43–75)
NITRITE UR QL STRIP: NEGATIVE
NON-SQ EPI CELLS URNS QL MICRO: ABNORMAL /HPF
NRBC BLD AUTO-RTO: 0 /100 WBCS
P AXIS: 81 DEGREES
P AXIS: 88 DEGREES
P AXIS: 93 DEGREES
PH UR STRIP.AUTO: 6 [PH]
PLATELET # BLD AUTO: 422 THOUSANDS/UL (ref 149–390)
PMV BLD AUTO: 8.8 FL (ref 8.9–12.7)
POTASSIUM SERPL-SCNC: 4.9 MMOL/L (ref 3.5–5.3)
PR INTERVAL: 170 MS
PR INTERVAL: 186 MS
PR INTERVAL: 192 MS
PROCALCITONIN SERPL-MCNC: <0.05 NG/ML
PROT SERPL-MCNC: 7.6 G/DL (ref 6.4–8.2)
PROT UR STRIP-MCNC: ABNORMAL MG/DL
PROTHROMBIN TIME: 13.4 SECONDS (ref 11.8–14.2)
PROTOCOL NAME: NORMAL
QRS AXIS: 122 DEGREES
QRS AXIS: 136 DEGREES
QRS AXIS: 136 DEGREES
QRSD INTERVAL: 72 MS
QRSD INTERVAL: 80 MS
QRSD INTERVAL: 82 MS
QT INTERVAL: 340 MS
QT INTERVAL: 392 MS
QT INTERVAL: 398 MS
QTC INTERVAL: 445 MS
QTC INTERVAL: 449 MS
QTC INTERVAL: 456 MS
RBC # BLD AUTO: 4.33 MILLION/UL (ref 3.81–5.12)
RBC #/AREA URNS AUTO: ABNORMAL /HPF
REASON FOR TERMINATION: NORMAL
SODIUM SERPL-SCNC: 139 MMOL/L (ref 136–145)
SP GR UR STRIP.AUTO: 1.02 (ref 1–1.03)
T WAVE AXIS: 72 DEGREES
T WAVE AXIS: 76 DEGREES
T WAVE AXIS: 80 DEGREES
TARGET HR FORMULA: NORMAL
TEST INDICATION: NORMAL
TIME IN EXERCISE PHASE: NORMAL
TROPONIN I SERPL-MCNC: <0.02 NG/ML
TROPONIN I SERPL-MCNC: <0.02 NG/ML
UROBILINOGEN UR QL STRIP.AUTO: 1 E.U./DL
VENTRICULAR RATE: 103 BPM
VENTRICULAR RATE: 79 BPM
VENTRICULAR RATE: 79 BPM
WBC # BLD AUTO: 10.85 THOUSAND/UL (ref 4.31–10.16)
WBC #/AREA URNS AUTO: ABNORMAL /HPF

## 2019-03-15 PROCEDURE — 93306 TTE W/DOPPLER COMPLETE: CPT | Performed by: INTERNAL MEDICINE

## 2019-03-15 PROCEDURE — 87040 BLOOD CULTURE FOR BACTERIA: CPT | Performed by: EMERGENCY MEDICINE

## 2019-03-15 PROCEDURE — 99225 PR SBSQ OBSERVATION CARE/DAY 25 MINUTES: CPT | Performed by: NURSE PRACTITIONER

## 2019-03-15 PROCEDURE — 99220 PR INITIAL OBSERVATION CARE/DAY 70 MINUTES: CPT | Performed by: HOSPITALIST

## 2019-03-15 PROCEDURE — 85025 COMPLETE CBC W/AUTO DIFF WBC: CPT | Performed by: EMERGENCY MEDICINE

## 2019-03-15 PROCEDURE — 80053 COMPREHEN METABOLIC PANEL: CPT | Performed by: EMERGENCY MEDICINE

## 2019-03-15 PROCEDURE — 99214 OFFICE O/P EST MOD 30 MIN: CPT | Performed by: INTERNAL MEDICINE

## 2019-03-15 PROCEDURE — 93306 TTE W/DOPPLER COMPLETE: CPT

## 2019-03-15 PROCEDURE — 84484 ASSAY OF TROPONIN QUANT: CPT | Performed by: PHYSICIAN ASSISTANT

## 2019-03-15 PROCEDURE — 93017 CV STRESS TEST TRACING ONLY: CPT

## 2019-03-15 PROCEDURE — 93010 ELECTROCARDIOGRAM REPORT: CPT | Performed by: INTERNAL MEDICINE

## 2019-03-15 PROCEDURE — 78452 HT MUSCLE IMAGE SPECT MULT: CPT | Performed by: INTERNAL MEDICINE

## 2019-03-15 PROCEDURE — 93018 CV STRESS TEST I&R ONLY: CPT | Performed by: INTERNAL MEDICINE

## 2019-03-15 PROCEDURE — 84145 PROCALCITONIN (PCT): CPT | Performed by: NURSE PRACTITIONER

## 2019-03-15 PROCEDURE — 85730 THROMBOPLASTIN TIME PARTIAL: CPT | Performed by: PHYSICIAN ASSISTANT

## 2019-03-15 PROCEDURE — 99285 EMERGENCY DEPT VISIT HI MDM: CPT

## 2019-03-15 PROCEDURE — 85379 FIBRIN DEGRADATION QUANT: CPT | Performed by: PHYSICIAN ASSISTANT

## 2019-03-15 PROCEDURE — 83605 ASSAY OF LACTIC ACID: CPT | Performed by: EMERGENCY MEDICINE

## 2019-03-15 PROCEDURE — 71046 X-RAY EXAM CHEST 2 VIEWS: CPT

## 2019-03-15 PROCEDURE — 93005 ELECTROCARDIOGRAM TRACING: CPT

## 2019-03-15 PROCEDURE — 71275 CT ANGIOGRAPHY CHEST: CPT

## 2019-03-15 PROCEDURE — 93016 CV STRESS TEST SUPVJ ONLY: CPT | Performed by: INTERNAL MEDICINE

## 2019-03-15 PROCEDURE — 81001 URINALYSIS AUTO W/SCOPE: CPT | Performed by: EMERGENCY MEDICINE

## 2019-03-15 PROCEDURE — 84484 ASSAY OF TROPONIN QUANT: CPT | Performed by: EMERGENCY MEDICINE

## 2019-03-15 PROCEDURE — 78452 HT MUSCLE IMAGE SPECT MULT: CPT

## 2019-03-15 PROCEDURE — 36415 COLL VENOUS BLD VENIPUNCTURE: CPT | Performed by: EMERGENCY MEDICINE

## 2019-03-15 PROCEDURE — A9502 TC99M TETROFOSMIN: HCPCS

## 2019-03-15 PROCEDURE — 85610 PROTHROMBIN TIME: CPT | Performed by: PHYSICIAN ASSISTANT

## 2019-03-15 RX ORDER — ONDANSETRON 2 MG/ML
1 INJECTION INTRAMUSCULAR; INTRAVENOUS ONCE
Status: DISCONTINUED | OUTPATIENT
Start: 2019-03-15 | End: 2019-03-15

## 2019-03-15 RX ORDER — BISACODYL 10 MG
10 SUPPOSITORY, RECTAL RECTAL DAILY
COMMUNITY

## 2019-03-15 RX ORDER — DILTIAZEM HYDROCHLORIDE 120 MG/1
120 CAPSULE, COATED, EXTENDED RELEASE ORAL DAILY
COMMUNITY
End: 2019-03-15 | Stop reason: ALTCHOICE

## 2019-03-15 RX ORDER — ONDANSETRON 4 MG/1
4 TABLET, ORALLY DISINTEGRATING ORAL ONCE
Status: COMPLETED | OUTPATIENT
Start: 2019-03-15 | End: 2019-03-15

## 2019-03-15 RX ORDER — DEXTROMETHORPHAN HYDROBROMIDE AND PROMETHAZINE HYDROCHLORIDE 15; 6.25 MG/5ML; MG/5ML
5 SYRUP ORAL 4 TIMES DAILY PRN
COMMUNITY
End: 2019-03-15 | Stop reason: ALTCHOICE

## 2019-03-15 RX ORDER — HEPARIN SODIUM 1000 [USP'U]/ML
4800 INJECTION, SOLUTION INTRAVENOUS; SUBCUTANEOUS ONCE
Status: COMPLETED | OUTPATIENT
Start: 2019-03-15 | End: 2019-03-15

## 2019-03-15 RX ORDER — ASPIRIN 81 MG/1
81 TABLET ORAL DAILY
Status: DISCONTINUED | OUTPATIENT
Start: 2019-03-15 | End: 2019-03-16 | Stop reason: HOSPADM

## 2019-03-15 RX ORDER — SIMVASTATIN 20 MG
20 TABLET ORAL
COMMUNITY
End: 2019-03-15 | Stop reason: ALTCHOICE

## 2019-03-15 RX ORDER — NITROGLYCERIN 0.4 MG/1
0.4 TABLET SUBLINGUAL
Status: DISCONTINUED | OUTPATIENT
Start: 2019-03-15 | End: 2019-03-16 | Stop reason: HOSPADM

## 2019-03-15 RX ORDER — HEPARIN SODIUM 10000 [USP'U]/100ML
3-30 INJECTION, SOLUTION INTRAVENOUS
Status: DISCONTINUED | OUTPATIENT
Start: 2019-03-15 | End: 2019-03-15

## 2019-03-15 RX ORDER — METHOCARBAMOL 750 MG/1
500 TABLET, FILM COATED ORAL EVERY 6 HOURS PRN
COMMUNITY

## 2019-03-15 RX ORDER — LANOLIN ALCOHOL/MO/W.PET/CERES
3 CREAM (GRAM) TOPICAL
COMMUNITY

## 2019-03-15 RX ORDER — FENTANYL 12 UG/H
1 PATCH TRANSDERMAL
Status: ON HOLD | COMMUNITY
End: 2019-03-16 | Stop reason: SDUPTHER

## 2019-03-15 RX ORDER — ACETAMINOPHEN 325 MG/1
650 TABLET ORAL EVERY 6 HOURS PRN
COMMUNITY

## 2019-03-15 RX ORDER — METHOCARBAMOL 500 MG/1
500 TABLET, FILM COATED ORAL EVERY 6 HOURS PRN
Status: DISCONTINUED | OUTPATIENT
Start: 2019-03-15 | End: 2019-03-16 | Stop reason: HOSPADM

## 2019-03-15 RX ORDER — ROSUVASTATIN CALCIUM 10 MG/1
10 TABLET, COATED ORAL DAILY
COMMUNITY

## 2019-03-15 RX ORDER — HEPARIN SODIUM 1000 [USP'U]/ML
4800 INJECTION, SOLUTION INTRAVENOUS; SUBCUTANEOUS AS NEEDED
Status: DISCONTINUED | OUTPATIENT
Start: 2019-03-15 | End: 2019-03-15

## 2019-03-15 RX ORDER — METOPROLOL TARTRATE 5 MG/5ML
5 INJECTION INTRAVENOUS ONCE
Status: COMPLETED | OUTPATIENT
Start: 2019-03-15 | End: 2019-03-15

## 2019-03-15 RX ORDER — ATORVASTATIN CALCIUM 40 MG/1
40 TABLET, FILM COATED ORAL
Status: DISCONTINUED | OUTPATIENT
Start: 2019-03-15 | End: 2019-03-16 | Stop reason: HOSPADM

## 2019-03-15 RX ORDER — HEPARIN SODIUM 1000 [USP'U]/ML
2400 INJECTION, SOLUTION INTRAVENOUS; SUBCUTANEOUS AS NEEDED
Status: DISCONTINUED | OUTPATIENT
Start: 2019-03-15 | End: 2019-03-15

## 2019-03-15 RX ORDER — SENNA PLUS 8.6 MG/1
1 TABLET ORAL DAILY
COMMUNITY
End: 2019-03-15 | Stop reason: ALTCHOICE

## 2019-03-15 RX ORDER — ONDANSETRON 4 MG/1
4 TABLET, FILM COATED ORAL EVERY 8 HOURS PRN
COMMUNITY

## 2019-03-15 RX ORDER — ACETAMINOPHEN 325 MG/1
650 TABLET ORAL EVERY 6 HOURS PRN
COMMUNITY
End: 2019-03-15 | Stop reason: ALTCHOICE

## 2019-03-15 RX ORDER — DIGOXIN 125 MCG
125 TABLET ORAL DAILY
COMMUNITY
End: 2019-03-15 | Stop reason: ALTCHOICE

## 2019-03-15 RX ORDER — ALBUTEROL SULFATE 2.5 MG/3ML
2.5 SOLUTION RESPIRATORY (INHALATION) EVERY 4 HOURS PRN
Status: DISCONTINUED | OUTPATIENT
Start: 2019-03-15 | End: 2019-03-16 | Stop reason: HOSPADM

## 2019-03-15 RX ORDER — LEVALBUTEROL 1.25 MG/.5ML
1.25 SOLUTION, CONCENTRATE RESPIRATORY (INHALATION) EVERY 8 HOURS PRN
Status: DISCONTINUED | OUTPATIENT
Start: 2019-03-15 | End: 2019-03-15

## 2019-03-15 RX ORDER — DOCUSATE SODIUM 100 MG/1
100 CAPSULE, LIQUID FILLED ORAL 2 TIMES DAILY
Status: DISCONTINUED | OUTPATIENT
Start: 2019-03-15 | End: 2019-03-16 | Stop reason: HOSPADM

## 2019-03-15 RX ORDER — CLOPIDOGREL BISULFATE 75 MG/1
75 TABLET ORAL DAILY
COMMUNITY

## 2019-03-15 RX ORDER — AMINOPHYLLINE DIHYDRATE 25 MG/ML
50 INJECTION, SOLUTION INTRAVENOUS ONCE
Status: COMPLETED | OUTPATIENT
Start: 2019-03-15 | End: 2019-03-15

## 2019-03-15 RX ORDER — ONDANSETRON 2 MG/ML
INJECTION INTRAMUSCULAR; INTRAVENOUS
Status: DISPENSED
Start: 2019-03-15 | End: 2019-03-15

## 2019-03-15 RX ORDER — LANOLIN ALCOHOL/MO/W.PET/CERES
3 CREAM (GRAM) TOPICAL
Status: DISCONTINUED | OUTPATIENT
Start: 2019-03-15 | End: 2019-03-16 | Stop reason: HOSPADM

## 2019-03-15 RX ORDER — PANTOPRAZOLE SODIUM 40 MG/1
40 TABLET, DELAYED RELEASE ORAL DAILY
Status: DISCONTINUED | OUTPATIENT
Start: 2019-03-15 | End: 2019-03-16 | Stop reason: HOSPADM

## 2019-03-15 RX ORDER — PANTOPRAZOLE SODIUM 40 MG/1
40 TABLET, DELAYED RELEASE ORAL DAILY
COMMUNITY

## 2019-03-15 RX ORDER — DOCUSATE SODIUM 100 MG/1
100 CAPSULE, LIQUID FILLED ORAL 2 TIMES DAILY
COMMUNITY

## 2019-03-15 RX ORDER — PANTOPRAZOLE SODIUM 20 MG/1
20 TABLET, DELAYED RELEASE ORAL DAILY
COMMUNITY
End: 2019-03-15 | Stop reason: ALTCHOICE

## 2019-03-15 RX ORDER — LANOLIN ALCOHOL/MO/W.PET/CERES
3 CREAM (GRAM) TOPICAL
COMMUNITY
End: 2019-03-15 | Stop reason: ALTCHOICE

## 2019-03-15 RX ORDER — CLOPIDOGREL BISULFATE 75 MG/1
75 TABLET ORAL DAILY
Status: DISCONTINUED | OUTPATIENT
Start: 2019-03-15 | End: 2019-03-16 | Stop reason: HOSPADM

## 2019-03-15 RX ORDER — HEPARIN SODIUM 5000 [USP'U]/ML
5000 INJECTION, SOLUTION INTRAVENOUS; SUBCUTANEOUS EVERY 8 HOURS SCHEDULED
Status: DISCONTINUED | OUTPATIENT
Start: 2019-03-15 | End: 2019-03-16 | Stop reason: HOSPADM

## 2019-03-15 RX ORDER — ASPIRIN 81 MG/1
81 TABLET ORAL DAILY
COMMUNITY

## 2019-03-15 RX ORDER — ONDANSETRON 2 MG/ML
1 INJECTION INTRAMUSCULAR; INTRAVENOUS ONCE
Status: COMPLETED | OUTPATIENT
Start: 2019-03-15 | End: 2019-03-15

## 2019-03-15 RX ORDER — SODIUM CHLORIDE 9 MG/ML
75 INJECTION, SOLUTION INTRAVENOUS CONTINUOUS
Status: DISCONTINUED | OUTPATIENT
Start: 2019-03-15 | End: 2019-03-16

## 2019-03-15 RX ORDER — FERROUS SULFATE 325(65) MG
325 TABLET ORAL
COMMUNITY
End: 2019-03-15 | Stop reason: ALTCHOICE

## 2019-03-15 RX ORDER — PHENYTOIN 50 MG/1
100 TABLET, CHEWABLE ORAL 2 TIMES DAILY
COMMUNITY
End: 2019-03-15 | Stop reason: ALTCHOICE

## 2019-03-15 RX ORDER — FENTANYL 12 UG/H
12 PATCH TRANSDERMAL
Status: DISCONTINUED | OUTPATIENT
Start: 2019-03-15 | End: 2019-03-16 | Stop reason: HOSPADM

## 2019-03-15 RX ORDER — OXYCODONE HYDROCHLORIDE 5 MG/1
2.5 TABLET ORAL EVERY 4 HOURS PRN
COMMUNITY
End: 2019-03-15 | Stop reason: ALTCHOICE

## 2019-03-15 RX ORDER — LOPERAMIDE HYDROCHLORIDE 2 MG/1
2 CAPSULE ORAL 4 TIMES DAILY PRN
COMMUNITY
End: 2019-03-15 | Stop reason: ALTCHOICE

## 2019-03-15 RX ORDER — ONDANSETRON 2 MG/ML
4 INJECTION INTRAMUSCULAR; INTRAVENOUS EVERY 6 HOURS PRN
Status: DISCONTINUED | OUTPATIENT
Start: 2019-03-15 | End: 2019-03-16 | Stop reason: HOSPADM

## 2019-03-15 RX ORDER — LORATADINE 10 MG/1
10 TABLET ORAL DAILY
COMMUNITY
End: 2019-03-15 | Stop reason: ALTCHOICE

## 2019-03-15 RX ADMIN — ASPIRIN 81 MG: 81 TABLET, COATED ORAL at 12:51

## 2019-03-15 RX ADMIN — MELATONIN 3 MG: 3 TAB ORAL at 22:00

## 2019-03-15 RX ADMIN — HEPARIN SODIUM 5000 UNITS: 5000 INJECTION INTRAVENOUS; SUBCUTANEOUS at 22:00

## 2019-03-15 RX ADMIN — PANTOPRAZOLE SODIUM 40 MG: 40 TABLET, DELAYED RELEASE ORAL at 12:42

## 2019-03-15 RX ADMIN — HEPARIN SODIUM AND DEXTROSE 18 UNITS/KG/HR: 10000; 5 INJECTION INTRAVENOUS at 06:28

## 2019-03-15 RX ADMIN — ONDANSETRON 4 MG: 4 TABLET, ORALLY DISINTEGRATING ORAL at 06:26

## 2019-03-15 RX ADMIN — HEPARIN SODIUM 4800 UNITS: 1000 INJECTION, SOLUTION INTRAVENOUS; SUBCUTANEOUS at 06:28

## 2019-03-15 RX ADMIN — METOPROLOL TARTRATE 5 MG: 5 INJECTION INTRAVENOUS at 10:53

## 2019-03-15 RX ADMIN — DOCUSATE SODIUM 100 MG: 100 CAPSULE, LIQUID FILLED ORAL at 12:42

## 2019-03-15 RX ADMIN — IOHEXOL 85 ML: 350 INJECTION, SOLUTION INTRAVENOUS at 06:52

## 2019-03-15 RX ADMIN — SODIUM CHLORIDE 75 ML/HR: 0.9 INJECTION, SOLUTION INTRAVENOUS at 11:05

## 2019-03-15 RX ADMIN — LEVALBUTEROL 1.25 MG: 1.25 SOLUTION, CONCENTRATE RESPIRATORY (INHALATION) at 11:21

## 2019-03-15 RX ADMIN — CLOPIDOGREL BISULFATE 75 MG: 75 TABLET ORAL at 12:51

## 2019-03-15 RX ADMIN — METOPROLOL TARTRATE 25 MG: 25 TABLET, FILM COATED ORAL at 12:51

## 2019-03-15 RX ADMIN — REGADENOSON 0.4 MG: 0.08 INJECTION, SOLUTION INTRAVENOUS at 10:33

## 2019-03-15 RX ADMIN — AMINOPHYLLINE 50 MG: 25 INJECTION, SOLUTION INTRAVENOUS at 10:37

## 2019-03-15 NOTE — ED NOTES
Pt unable to provide urine sample at this time  Cream put near groin area due to diaper rash        Kaye Schmidt  03/15/19 9020

## 2019-03-15 NOTE — CONSULTS
Consultation - Cardiology Team One  Stefano Victor 80 y o  female MRN: [id number]  Unit/Bed#: -01 Encounter: [id number]    Consults    Physician Requesting Consult: Ramón Levy MD  Reason for Consult / Principal Problem:  Chest pain    HPI: Cardiologist Dr Sigrid Dandy is a 80y o  year old female who has a history of CAD with history of stents in the past, last 2004, hypertension, hyperlipidemia, COPD, PVD, stroke  Patient came to the emergency room on 3/15/2019 in the early morning with complaints of chest pain that started around 3:00 a m  [place] [facility] Patient states it woke up out of her sleep and was in the center of her chest   She describes it as sharp pressure-like pain  She also had nausea but no vomiting  She also had dizziness  She denies any pain down her arm, up to her neck, through to her back  Currently the patient states she is feeling better      REVIEW OF SYSTEMS:  Constitutional:  Denies fever or chills   Eyes:  Denies change in visual acuity   HENT:  Denies nasal congestion or sore throat   Respiratory:  Denies cough or shortness of breath   Cardiovascular:  + chest pain  GI: + nausea Denies abdominal pain, vomiting, bloody stools or diarrhea   :  Denies dysuria, frequency, difficulty in micturition and nocturia  Musculoskeletal:  Denies back pain or joint pain   Neurologic: + dizziness Denies headache, focal weakness or sensory changes   Endocrine:  Denies polyuria or polydipsia   Lymphatic:  Denies swollen glands   Psychiatric:  Denies depression or anxiety     Historical Information   Past Medical History:   Diagnosis Date    Cardiac disease     [facility] )     Hyperlipidemia     Hypertension     MI ([facility]ocardial infarction) ([facility] )     PVD (peripheral vascular disease) ([facility] )     Stroke ([facility] )      Past Surgical History:   Procedure Laterality Date    APPENDECTOMY      BREAST SURGERY      CHOLECYSTECTOMY      CORONARY ANGIOPLASTY  ESOPHAGUS FOREIGN BODY REMOVAL N/A 6/13/2017    Procedure: REMOVAL FOREIGN BODY ESOPHAGUS;  Surgeon: Delmi Gaines MD;  Location: [facility];  Service: Gastroenterology     Social History     Substance and Sexual Activity   Alcohol Use No     Social History     Substance and Sexual Activity   Drug Use No     Social History     Tobacco Use   Smoking Status Former Smoker   Smokeless Tobacco Former User       Family History: History reviewed  No pertinent family history      MEDS & ALLERGIES:  all current active meds have been reviewed and current meds: Current Facility-Administered Medications   Medication Dose Route Frequency    aspirin (ECOTRIN LOW STRENGTH) EC tablet 81 mg  81 mg Oral Daily    atorvastatin (LIPITOR) tablet 40 mg  40 mg Oral Daily With Dinner    clopidogrel (PLAVIX) tablet 75 mg  75 mg Oral Daily    docusate sodium (COLACE) capsule 100 mg  100 mg Oral BID    heparin (porcine) 25,000 units in 250 mL infusion (premix)  3-30 Units/kg/hr (Order-Specific) Intravenous Titrated    heparin (porcine) injection 2,400 Units  2,400 Units Intravenous PRN    heparin (porcine) injection 4,800 Units  4,800 Units Intravenous PRN    magnesium hydroxide (MILK OF MAGNESIA) 400 mg/5 mL oral suspension 30 mL  30 mL Oral Daily PRN    melatonin tablet 3 mg  3 mg Oral HS    methocarbamol (ROBAXIN) tablet 500 mg  500 mg Oral Q6H PRN    metoprolol tartrate (LOPRESSOR) tablet 25 mg  25 mg Oral Q12H [address]    morphine injection 2 mg  2 mg Intravenous Q1H PRN    nitroglycerin (NITROSTAT) SL tablet 0 4 mg  0 4 mg Sublingual Q5 Min PRN    ondansetron (ZOFRAN) injection 4 mg  4 mg Intravenous Q6H PRN    pantoprazole (PROTONIX) EC tablet 40 mg  40 mg Oral Daily    sodium chloride 0 9 % infusion  75 mL/hr Intravenous Continuous       heparin (porcine) 3-30 Units/kg/hr (Order-Specific) Last Rate: Stopped (03/15/19 1032)   sodium chloride 75 mL/hr      Allergies   Allergen Reactions    Amoxicillin Other (See Comments)     unsure    Penicillins Other (See Comments)     unsure       OBJECTIVE:  Vitals:   Vitals:    03/15/19 0749   BP:    Pulse: 82   Resp:    Temp:    SpO2: 92%     Body mass index is 24 68 kg/m²  Systolic (53UYQ), WLS:769 , Min:102 , WRB:980     Diastolic (98BOF), MDU:53, Min:49, Max:59    No intake or output data in the 24 hours ending 03/15/19 1039  Weight (last 2 days)     Date/Time   Weight    03/15/19 0508   61 2 (134 92)            Invasive Devices     Peripheral Intravenous Line            Peripheral IV 03/15/19 Left Antecubital less than 1 day    Peripheral IV 03/15/19 Right Antecubital less than 1 day                PHYSICAL EXAMS:  General:  Patient is not in acute distress, laying in the bed comfortably, awake, alert responding to commands  Head: Normocephalic, Atraumatic  HEENT: White sclera, pink conjunctiva,  PERRLA,pharynx benign  Neck:  Supple, no neck vein distention, carotids+2/+2 no bruits, thyromegaly, adenopathy  Respiratory: clear to P/A  Cardiovascular:  PMI normal, S1-S2 normal, No  Murmurs, thrills, gallops, rubs   Regular rhythm  GI:  Abdomen soft nontender   No hepatosplenomegaly, adenopathy, ascites,or rebound tenderness  Extremities: No edema, normal pulses, no calf tenderness, no joint deformities, no venous disease   Integument:  No skin rashes or ulceration  Lymphatic:  No cervical or inguinal lymphadenopathy  Neurologic:  Patient is awake alert, responding to command, well-oriented to time and place and person moving all extremities    LABORATORY RESULTS:  Results from last 7 days   Lab Units 03/15/19  0816 03/15/19  0454   TROPONIN I ng/mL <0 02 <0 02     CBC with diff: Results from last 7 days   Lab Units 03/15/19  0454   WBC Thousand/uL 10 85*   HEMOGLOBIN g/dL 13 1   HEMATOCRIT % 42 9   MCV fL 99*   PLATELETS Thousands/uL 422*   MCH pg 30 3   MCHC g/dL 30 5*   RDW % 12 3   MPV fL 8 8*   NRBC AUTO /100 WBCs 0       CMP:  Results from last 7 days   Lab Units 03/15/19  0454   POTASSIUM mmol/L 4 9   CHLORIDE mmol/L 98*   CO2 mmol/L 36*   BUN mg/dL 16   CREATININE mg/dL 0 65   CALCIUM mg/dL 9 4   AST U/L 18   ALT U/L 16   ALK PHOS U/L 71   EGFR ml/min/1 73sq m 80       BMP:  Results from last 7 days   Lab Units 03/15/19  0454   POTASSIUM mmol/L 4 9   CHLORIDE mmol/L 98*   CO2 mmol/L 36*   BUN mg/dL 16   CREATININE mg/dL 0 65   CALCIUM mg/dL 9 4                        Results from last 7 days   Lab Units 03/15/19  0454   INR  1 03       Lipid Profile:   Lab Results   Component Value Date    CHOL 161 06/03/2015     Lab Results   Component Value Date    HDL 74 (H) 02/24/2017    HDL 73 06/03/2015     Lab Results   Component Value Date    LDLCALC 48 02/24/2017    LDLCALC 69 06/03/2015     Lab Results   Component Value Date    TRIG 111 02/24/2017    TRIG 96 06/03/2015       Cardiac testing:   No results found for this or any previous visit  No results found for this or any previous visit  No procedure found  No results found for this or any previous visit  Imaging:   I have personally reviewed pertinent reports  EKG reviewed personally:  Normal sinus rhythm, right axis deviation, pulmonary disease pattern    Assessment/Plan:  1-chest pain in patient with known CAD/stent (last one 2004); troponins negative x2  Will check echocardiogram to assess LV function and regional wall motion for abnormalities  Will check nuclear stress test to assess for ischemia  Further recommendations based upon stress test and echocardiogram findings  2-history of CAD with stent, the last 1 being 2004  Continue aspirin, Lipitor, Plavix, Lopressor, heparin drip  3-hypertension, stable  Continue all medications    4-hyperlipidemia, continue Lipitor      Code Status: Level 2 - DNAR: but accepts endotracheal intubation    Counseling / Coordination of Care  Total floor / unit time spent today 35 minutes    Greater than 50% of total time was spent with the patient and / or family counseling and / or coordination of care  A description of the counseling / coordination of care: Review of history, current assessment, development of a plan      Vi Blount PA-C  3/15/2019,10:39 AM

## 2019-03-15 NOTE — PROGRESS NOTES
Progress Note - Teri Sanches 5/12/1930, 80 y o  female MRN: [id number]    Unit/Bed#: -01 Encounter: [id number]    Primary Care Provider: Salma Foreman DO   Date and time admitted to hospital: 3/15/2019  4:53 AM        * Chest pain  Assessment & Plan  As reported by patient initially admitted on a heparin GTT  Evaluated by Cardiology no need for heparin GTT  Recommending nuclear medicine stat dress test and agree with echocardiogram  Patient had a normal negative stress test and echocardiogram normal EF and no regional wall motion abnormalities  Patient was cleared from cardiology perspective  Patient did have incidental findings of possible atelectasis versus pneumonia right lower lobe has a mild leukocytosis however appears asymptomatic without cough will obtain a procalcitonin if negative patient can likely return to her personal care facility tomorrow  Further concerned CTA showed a large hiatal hernia and esophageal thickening concerning for possible esophagitis could be correlating etiology to patient's symptom would recommend continuing a PPI on discharge      CAD (coronary artery disease)  Assessment & Plan  Plan per principal problem    HTN (hypertension)  Assessment & Plan  Continue home regimen with parameters    Hyperlipidemia  Assessment & Plan  Continue with statin    Centrilobular emphysema ([address] )  Assessment & Plan  Continue with current medications  P r n  Albuterol        VTE Pharmacologic Prophylaxis:   Pharmacologic: Heparin  Mechanical VTE Prophylaxis in Place: Yes    Patient Centered Rounds: I have performed bedside rounds with nursing staff today  Discussions with Specialists or Other Care Team Provider:  Cardiology    Education and Discussions with Family / Patient:  Patient and daughter updated via phone    Time Spent for Care: 30 minutes  More than 50% of total time spent on counseling and coordination of care as described above      Current Length of Stay: 0 day(s)    Current Patient Status: Observation   Certification Statement: The patient, admitted on an observation basis, will now require > 2 midnight hospital stay due to Ongoing chest pain symptoms with need for further diagnostic evaluations rule out pneumonia as possibility    Discharge Plan:  Anticipate discharge in a m  Code Status: Level 2 - DNAR: but accepts endotracheal intubation      Subjective:   Patient reporting improvement for chest pain symptoms however does note lately she has been sleeping with 4 pillows denies cough fever chills  Given CT findings long discussion with daughter via phone will check a procalcitonin is negative suspected patient's symptoms could be in setting of esophagitis and large findings of a large hiatal hernia  Daughter agreeable to another night stay and likely discharge if workup is negative  Objective:     Vitals:   Temp (24hrs), Av °F (36 7 °C), Min:97 5 °F (36 4 °C), Max:98 8 °F (37 1 °C)    Temp:  [97 5 °F (36 4 °C)-98 8 °F (37 1 °C)] 97 5 °F (36 4 °C)  HR:  [] 71  Resp:  [17-21] 18  BP: (102-131)/(49-61) 131/56  SpO2:  [86 %-98 %] 98 %  Body mass index is 24 19 kg/m²  Input and Output Summary (last 24 hours): Intake/Output Summary (Last 24 hours) at 3/15/2019 1840  Last data filed at 3/15/2019 1648  Gross per 24 hour   Intake    Output 100 ml   Net -100 ml       Physical Exam:     Physical Exam   Constitutional: She is oriented to person, place, and time  HENT:   Head: Normocephalic and atraumatic  Eyes: Conjunctivae and EOM are normal    Neck: Normal range of motion  Cardiovascular: Normal rate, regular rhythm and normal heart sounds  Pulmonary/Chest: Effort normal  She has decreased breath sounds in the right lower field  Abdominal: Soft  Bowel sounds are normal    Musculoskeletal: Normal range of motion  Neurological: She is alert and oriented to person, place, and time  Skin: Skin is warm and dry     Psychiatric: She has a normal mood and affect  Her behavior is normal          Additional Data:     Labs:    Results from last 7 days   Lab Units 03/15/19  0454   WBC Thousand/uL 10 85*   HEMOGLOBIN g/dL 13 1   HEMATOCRIT % 42 9   PLATELETS Thousands/uL 422*   NEUTROS PCT % 71   LYMPHS PCT % 19   MONOS PCT % 6   EOS PCT % 2     Results from last 7 days   Lab Units 03/15/19  0454   SODIUM mmol/L 139   POTASSIUM mmol/L 4 9   CHLORIDE mmol/L 98*   CO2 mmol/L 36*   BUN mg/dL 16   CREATININE mg/dL 0 65   ANION GAP mmol/L 5   CALCIUM mg/dL 9 4   ALBUMIN g/dL 3 3*   TOTAL BILIRUBIN mg/dL 0 20   ALK PHOS U/L 71   ALT U/L 16   AST U/L 18   GLUCOSE RANDOM mg/dL 126     Results from last 7 days   Lab Units 03/15/19  0454   INR  1 03             Results from last 7 days   Lab Units 03/15/19  0503   LACTIC ACID mmol/L 1 6           * I Have Reviewed All Lab Data Listed Above  * Additional Pertinent Lab Tests Reviewed:  [address] Admission Reviewed    Imaging:    Imaging Reports Reviewed Today Include:  As mentioned      Recent Cultures (last 7 days):           Last 24 Hours Medication List:     Current Facility-Administered Medications:  albuterol 2 5 mg Nebulization Q4H PRN Jody Jackson MD    aspirin 81 mg Oral Daily Vitor Santiago MD    atorvastatin 40 mg Oral Daily With Rupa Hernandez MD    clopidogrel 75 mg Oral Daily Vitor Santiago MD    docusate sodium 100 mg Oral BID Vitor Santiago MD    fentaNYL 12 mcg Transdermal Q72H Mearl Grade, CRNP    magnesium hydroxide 30 mL Oral Daily PRN Viotr Santiago MD    melatonin 3 mg Oral HS Vitor Santiago MD    methocarbamol 500 mg Oral Q6H PRN Vitor Santiago MD    metoprolol tartrate 25 mg Oral Q12H [address] Vitor Santiago MD    morphine injection 2 mg Intravenous Q1H PRN Vitor Santiago MD    nitroglycerin 0 4 mg Sublingual Q5 Min PRN Vitor Santiago MD    ondansetron 4 mg Intravenous Q6H PRN Vitor Santiago MD pantoprazole 40 mg Oral Daily Luis Alfredo Peralta MD    sodium chloride 75 mL/hr Intravenous Continuous Luis Alfredo Peralta MD Last Rate: 75 mL/hr (03/15/19 1105)        Today, Patient Was Seen By: SHELLEY Kasper    ** Please Note: Dictation voice to text software may have been used in the creation of this document   **

## 2019-03-15 NOTE — H&P
H&P- Erin Drake 5/12/1930, 80 y o  female MRN: [id number]    Unit/Bed#: ED 26 Encounter: [id number]    Primary Care Provider: Elisa Burk DO   Date and time admitted to hospital: 3/15/2019  4:53 AM        Chest pain  Assessment & Plan  Patient has a history of CAD status post stenting  Chest pain started around 4:00 a m , has some relief nitro  Initial cardiac markers are negative  D-dimer is elevated  Concern of unstable angina  Will start the patient on heparin drip, continue with aspirin Plavix  Consult Cardiology  Obtain echocardiogram  Start patient statin, continue beta-blocker      Centrilobular emphysema ([address] )  Assessment & Plan  Continue with current medications  P r n  Albuterol    Hyperlipidemia  Assessment & Plan  Continue with statins    CAD (coronary artery disease)  Assessment & Plan  As in chest pain section    HTN (hypertension)  Assessment & Plan  Resume home medication  Monitor and titrate as needed      VTE Prophylaxis: Heparin Drip   Code Status: DNR  Discussion with family: YES    Anticipated Length of Stay:  Patient will be admitted on an Observation basis with an anticipated length of stay of = 2 midnights  Justification for Hospital Stay: Chest pain/UA    Total Time for Visit, including Counseling / Coordination of Care: 1 hour  Greater than 50% of this total time spent on direct patient counseling and coordination of care  Chief Complaint:   Chest pain  History of Present Illness:    Erin Drake is a 80 y o  female who presents with past medical history of CAD COPD on oxygen hypertension who was sent from the nursing due to complain of chest pain which started around 4:00 a m  This morning chest pain is sharp/pressure nature  No radiation, no numbness in the left, patient complained of nausea but no vomiting also complain of dizziness  Initial cardiac markers were negative  Review of Systems:    Review of Systems   Constitutional: Negative  HENT: Negative  Respiratory: Positive for chest tightness  Cardiovascular: Positive for chest pain  Negative for palpitations and leg swelling  Gastrointestinal: Positive for nausea  Negative for abdominal pain and diarrhea  Genitourinary: Negative  Musculoskeletal: Positive for arthralgias  Neurological: Positive for light-headedness  Negative for facial asymmetry and headaches  Psychiatric/Behavioral: Negative  Past Medical and Surgical History:     Past Medical History:   Diagnosis Date    Cardiac disease     COPD (chronic obstructive pulmonary disease) ([place] [address] )     Hyperlipidemia     Hypertension     MI (myocardial infarction) ([place] [address] )     PVD (peripheral vascular disease) ([place] [address] )     Stroke ([place] [address] )        Past Surgical History:   Procedure Laterality Date    APPENDECTOMY      BREAST SURGERY      CHOLECYSTECTOMY      CORONARY ANGIOPLASTY      ESOPHAGUS FOREIGN BODY REMOVAL N/A 6/13/2017    Procedure: REMOVAL FOREIGN BODY ESOPHAGUS;  Surgeon: Karla Lopez MD;  Location: [facility];  Service: Gastroenterology       Meds/Allergies:    Prior to Admission medications    Medication Sig Start Date End Date Taking?  Authorizing Provider   acetaminophen (TYLENOL) 325 mg tablet Take 650 mg by mouth every 6 (six) hours as needed for mild pain  3/15/19 Yes Historical Provider, MD   apixaban (ELIQUIS) 5 mg Take 5 mg by mouth 2 (two) times a day  3/15/19 Yes Historical Provider, MD   ARTIFICIAL TEAR SOLUTION OP Apply 1 drop to eye 2 (two) times a day Both eyes  3/15/19 Yes Historical Provider, MD   bisacodyl (DULCOLAX) 5 mg EC tablet Take 10 mg by mouth daily as needed for constipation  3/15/19 Yes Historical Provider, MD   digoxin (LANOXIN) 0 125 mg tablet Take 125 mcg by mouth daily  3/15/19 Yes Historical Provider, MD   diltiazem (CARDIZEM CD) 120 mg 24 hr capsule Take 120 mg by mouth daily  3/15/19 Yes Historical Provider, MD   ferrous sulfate 325 (65 Fe) mg tablet Take 325 mg by mouth 3 (three) times a day with meals  3/15/19 Yes Historical Provider, MD   loperamide (IMODIUM) 2 mg capsule Take 2 mg by mouth 4 (four) times a day as needed for diarrhea  3/15/19 Yes Historical Provider, MD   loratadine (CLARITIN) 10 mg tablet Take 10 mg by mouth daily  3/15/19 Yes Historical Provider, MD   magnesium hydroxide (MILK OF MAGNESIA) 400 mg/5 mL oral suspension Take 30 mL by mouth daily as needed for constipation  3/15/19 Yes Historical Provider, MD   melatonin 3 mg Take 3 mg by mouth daily at bedtime  3/15/19 Yes Historical Provider, MD   oxyCODONE (ROXICODONE) 5 mg immediate release tablet Take 2 5 mg by mouth every 4 (four) hours as needed for moderate pain  3/15/19 Yes Historical Provider, MD   pantoprazole (PROTONIX) 20 mg tablet Take 20 mg by mouth daily  3/15/19 Yes Historical Provider, MD   phenytoin (DILANTIN) 50 mg tablet Chew 100 mg 2 (two) times a day  3/15/19 Yes Historical Provider, MD   promethazine-dextromethorphan (PHENERGAN-DM) 6 25-15 mg/5 mL oral syrup Take 5 mL by mouth 4 (four) times a day as needed for cough  3/15/19 Yes Historical Provider, MD   senna (SENOKOT) 8 6 MG tablet Take 1 tablet by mouth daily  3/15/19 Yes Historical Provider, MD   simvastatin (ZOCOR) 20 mg tablet Take 20 mg by mouth daily at bedtime  3/15/19 Yes Historical Provider, MD   albuterol (PROVENTIL HFA,VENTOLIN HFA) 90 mcg/act inhaler Inhale 2 puffs every 6 (six) hours as needed for wheezing 10/19/17 3/15/19  Roc Anders MD   aspirin 81 mg chewable tablet Chew 81 mg daily  3/15/19  Historical Provider, MD   benzonatate (TESSALON PERLES) 100 mg capsule TAKE 1 CAPSULE BY MOUTH THREE TIMES DAILY AS NEEDED FOR COUGH 9/9/18 3/15/19  Arik Ballard DO   clopidogrel (PLAVIX) 75 mg tablet Take 1 tablet (75 mg total) by mouth daily 9/11/18 3/15/19  Bhaskar Stephen MD   guaiFENesin (MUCINEX) 600 mg 12 hr tablet Take 1 tablet by mouth every 12 (twelve) hours 10/19/17 3/15/19  Roc Anders MD   metoprolol tartrate (LOPRESSOR) 25 mg tablet Take 1 tablet (25 mg total) by mouth 2 (two) times a day 9/11/18 3/15/19  Kaylene Stanford MD   nitroglycerin (NITROSTAT) 0 4 mg SL tablet PLACE 1 TABLET UNDER THE TONGUE EVERY 5 MINUTES AS NEEDED FOR CHEST PAIN 8/14/18 3/15/19  Arik Ballard DO   pantoprazole (PROTONIX) 40 mg tablet Take 1 tablet (40 mg total) by mouth daily  Patient taking differently: Take 20 mg by mouth daily  9/6/18 3/15/19  Arik Balalrd DO   rosuvastatin (CRESTOR) 10 MG tablet Take 1 tablet (10 mg total) by mouth daily 9/11/18 3/15/19  Kaylene Stanford MD   tiotropium (SPIRIVA RESPIMAT) 1 25 MCG/ACT AERS inhaler Inhale daily 11/1/17 3/15/19  Historical Provider, MD   umeclidinium bromide (INCRUSE ELLIPTA) 62 5 mcg/inh AEPB inhaler Inhale daily 11/1/17 3/15/19  Historical Provider, MD     I have reveiwed home medications using records provided by [facility]  Allergies: Allergies   Allergen Reactions    Amoxicillin Other (See Comments)     unsure    Penicillins Other (See Comments)     unsure       Social History:  Marital Status:    Substance Use History:   Social History     Substance and Sexual Activity   Alcohol Use No     Social History     Tobacco Use   Smoking Status Former Smoker   Smokeless Tobacco Former User     Social History     Substance and Sexual Activity   Drug Use No       Family History:    History reviewed  No pertinent family history  Physical Exam:     Vitals:   Blood Pressure: 125/59 (03/15/19 0507)  Pulse: 100 (03/15/19 0507)  Temperature: 97 7 °F (36 5 °C) (03/15/19 0554)  Temp Source: Oral (03/15/19 0554)  Respirations: 21 (03/15/19 0507)  Weight - Scale: 61 2 kg (134 lb 14 7 oz) (03/15/19 0508)  SpO2: 92 % (03/15/19 0507)    Physical Exam   Constitutional: She is oriented to person, place, and time  She appears well-developed  No distress  HENT:   Head: Normocephalic and atraumatic  Eyes: Pupils are equal, round, and reactive to light  EOM are normal    Neck: Normal range of motion  Neck supple   No JVD present  Cardiovascular: Normal rate and regular rhythm  Pulmonary/Chest: Effort normal and breath sounds normal  She has no wheezes  She has no rales  Abdominal: Soft  Bowel sounds are normal  She exhibits no distension  Musculoskeletal: Normal range of motion  She exhibits no edema  Neurological: She is alert and oriented to person, place, and time  No sensory deficit  Normal speech  No focal weakness  Hard hearing       Skin: Skin is warm and dry  She is not diaphoretic  Additional Data:     Lab Results: I have personally reviewed pertinent reports  Results from last 7 days   Lab Units 03/15/19  0454   WBC Thousand/uL 10 85*   HEMOGLOBIN g/dL 13 1   HEMATOCRIT % 42 9   PLATELETS Thousands/uL 422*   NEUTROS PCT % 71   LYMPHS PCT % 19   MONOS PCT % 6   EOS PCT % 2                     Results from last 7 days   Lab Units 03/15/19  0503   LACTIC ACID mmol/L 1 6       Imaging: I have personally reviewed pertinent films in PACS    XR chest 2 views    (Results Pending)   CTA ED chest PE study    (Results Pending)       EKG, Pathology, and Other Studies Reviewed on Admission:   · EKG: NSR,      Allscripts / Epic Records Reviewed: Yes     ** Please Note: This note has been constructed using a voice recognition system   **

## 2019-03-15 NOTE — ED NOTES
1  CC: Chest pain  2  Orientation status: Ox3  3  Abnormal labs/vitals/focused assessment: blind, hard of hearing, elevated d-dimer  4  Medications/drips: heparin gtt 18 units/kg/hr  5  Narcotic time: fentanyl patch on left upper chest   6  IV lines/drains/etc : EMS 20g LAC, 20g RAC  7  Isolation status: NA  8  Skin: skin breakdown on right groin  9  Ambulation status: assist x1  10   Phone number: [phone]       Dale Hernandez RN  03/15/19 4704

## 2019-03-15 NOTE — ASSESSMENT & PLAN NOTE
Patient has a history of CAD status post stenting  Chest pain started around 4:00 a m , has some relief nitro  Initial cardiac markers are negative  D-dimer is elevated  Concern of unstable angina  Will start the patient on heparin drip, continue with aspirin Plavix  Consult Cardiology  Obtain echocardiogram  Start patient statin, continue beta-blocker

## 2019-03-15 NOTE — ASSESSMENT & PLAN NOTE
As reported by patient initially admitted on a heparin GTT  Evaluated by Cardiology no need for heparin GTT  Recommending nuclear medicine stat dress test and agree with echocardiogram  Patient had a normal negative stress test and echocardiogram normal EF and no regional wall motion abnormalities  Patient was cleared from cardiology perspective  Patient did have incidental findings of possible atelectasis versus pneumonia right lower lobe has a mild leukocytosis however appears asymptomatic without cough will obtain a procalcitonin if negative patient can likely return to her personal care facility tomorrow  Further concerned CTA showed a large hiatal hernia and esophageal thickening concerning for possible esophagitis could be correlating etiology to patient's symptom would recommend continuing a PPI on discharge

## 2019-03-15 NOTE — ED PROVIDER NOTES
History  Chief Complaint   Patient presents with    Chest Pain     Patient stated that she has been having left sided chest pain for the last hour  Hx of four MI  Patient is a 80year old female with a history of myocardial infarction, COPD, peripheral vascular disease, and stroke the presents emergency department with worsening persistent left-sided chest pressure for 1 hour  Patient was brought emergency department by ambulance whereby she was given Zofran, ASA, and nitroglycerin  Patient verbalized EMT that left-sided chest pain had decreased from 6/10 to 4/10 status post nitroglycerin delivery  Patient verbalizes that her 1st myocardial infarction was in 1969  Patient currently lives in an assisted living facility  Patient stated that her current ED symptomatology of left-sided chest pressure had woken her up out of sound sleep and feels similar to past myocardial infarctions previously experienced  Patient currently on Plavix  Patient denies palliative and provocative factors  Patient denies not effective treatment  Patient denies fevers, chills, and vomiting  Patient denies diarrhea, constipation, and urinary symptoms  Patient denies headache, tinnitus, dizziness, meningeal, and vertiginous symptoms  Patient denies recent fall or recent trauma  Patient sick contacts and recent travel  Patient denies numbness, tingling, and loss of power  Patient affirms shortness of breath commencing with ED presenting symptomatology of chest pain  Patient denies abdominal pain  Patient is not acute distress        History provided by:  Patient   used: No    Chest Pain   Pain location:  L chest  Pain quality: pressure    Pain radiates to:  L shoulder  Pain radiates to the back: no    Pain severity:  Mild  Onset quality:  Sudden  Duration:  1 hour  Timing:  Constant  Progression:  Improving (Status post nitroglycerin)  Chronicity:  Recurrent  Context: not breathing, no drug use, not eating, no intercourse, not lifting, no movement, not raising an arm, not at rest, no stress and no trauma    Relieved by:  Nitroglycerin, aspirin, oxygen, antacids, rest and certain positions  Worsened by:  Nothing tried  Ineffective treatments:  None tried  Associated symptoms: nausea    Associated symptoms: no abdominal pain, no AICD problem, no altered mental status, no anorexia, no anxiety, no back pain, no claudication, no cough, no diaphoresis, no dizziness, no fatigue, no fever, no headache, no heartburn, no lower extremity edema, no numbness, no orthopnea, no palpitations, no PND, no shortness of breath, not vomiting and no weakness    Nausea:     Severity:  Mild    Onset quality:  Gradual    Duration:  3 days    Timing:  Intermittent    Progression:  Waxing and waning  Risk factors: high cholesterol and hypertension    Risk factors: no aortic disease, no Marfan's syndrome, not obese, no prior DVT/PE, no smoking and no surgery        Prior to Admission Medications   Prescriptions Last Dose Informant Patient Reported?  Taking?   acetaminophen (TYLENOL) 325 mg tablet   Yes Yes   Sig: Take 650 mg by mouth every 6 (six) hours as needed for mild pain   aspirin (ECOTRIN LOW STRENGTH) 81 mg EC tablet   Yes Yes   Sig: Take 81 mg by mouth daily   bisacodyl (DULCOLAX) 10 mg suppository   Yes Yes   Sig: Insert 10 mg into the rectum daily   clopidogrel (PLAVIX) 75 mg tablet   Yes Yes   Sig: Take 75 mg by mouth daily   docusate sodium (COLACE) 100 mg capsule   Yes Yes   Sig: Take 100 mg by mouth 2 (two) times a day   fentaNYL (DURAGESIC) 12 mcg/hr TD 72 hr patch   Yes Yes   Sig: Place 1 patch on the skin every third day   magnesium hydroxide (MILK OF MAGNESIA) 400 mg/5 mL oral suspension   Yes Yes   Sig: Take 30 mL by mouth daily as needed for constipation   melatonin 3 mg   Yes Yes   Sig: Take 3 mg by mouth daily at bedtime   menthol-cetylpyridinium (CEPACOL) 3 MG lozenge   Yes Yes   Sig: Take 1 lozenge by mouth as needed for sore throat   methocarbamol (ROBAXIN) 750 mg tablet   Yes Yes   Sig: Take 500 mg by mouth every 6 (six) hours as needed for muscle spasms   metoprolol tartrate (LOPRESSOR) 25 mg tablet   Yes Yes   Sig: Take 25 mg by mouth every 12 (twelve) hours   ondansetron (ZOFRAN) 4 mg tablet   Yes Yes   Sig: Take 4 mg by mouth every 8 (eight) hours as needed for nausea or vomiting   pantoprazole (PROTONIX) 40 mg tablet   Yes Yes   Sig: Take 40 mg by mouth daily   rosuvastatin (CRESTOR) 10 MG tablet   Yes Yes   Sig: Take 10 mg by mouth daily      Facility-Administered Medications: None       Past Medical History:   Diagnosis Date    Cardiac disease     [facility]     Hyperlipidemia     Hypertension     [place] ([place]ocardial infarction) ([place] [address] )     PVD (peripheral vascular disease) ([place] [address] )     Stroke ([place] [address] )        Past Surgical History:   Procedure Laterality Date    APPENDECTOMY      BREAST SURGERY      CHOLECYSTECTOMY      CORONARY ANGIOPLASTY      ESOPHAGUS FOREIGN BODY REMOVAL N/A 6/13/2017    Procedure: REMOVAL FOREIGN BODY ESOPHAGUS;  Surgeon: Deann Johnson MD;  Location: [facility];  Service: Gastroenterology       History reviewed  No pertinent family history  I have reviewed and agree with the history as documented  Social History     Tobacco Use    Smoking status: Former Smoker    Smokeless tobacco: Former User   Substance Use Topics    Alcohol use: No    Drug use: No        Review of Systems   Constitutional: Negative for chills, diaphoresis, fatigue and fever  HENT: Negative for congestion, nosebleeds, postnasal drip, rhinorrhea, sinus pressure and sinus pain  Eyes: Negative for photophobia and visual disturbance  Respiratory: Negative for cough, chest tightness and shortness of breath  Cardiovascular: Positive for chest pain  Negative for palpitations, orthopnea, claudication, leg swelling and PND  Gastrointestinal: Positive for nausea   Negative for abdominal pain, anorexia, constipation, diarrhea, heartburn and vomiting  Genitourinary: Negative for difficulty urinating, dysuria and frequency  Musculoskeletal: Negative for back pain, gait problem, neck pain and neck stiffness  Skin: Negative for pallor and rash  Allergic/Immunologic: Negative for environmental allergies and food allergies  Neurological: Negative for dizziness, weakness, numbness and headaches  Psychiatric/Behavioral: Negative for confusion  All other systems reviewed and are negative  Physical Exam  Physical Exam   Constitutional: She is oriented to person, place, and time  She appears well-developed and well-nourished  She is active and cooperative  Non-toxic appearance  She does not have a sickly appearance  She does not appear ill  No distress  HENT:   Head: Normocephalic and atraumatic  Right Ear: Hearing, tympanic membrane, external ear and ear canal normal  No drainage, swelling or tenderness  No mastoid tenderness  No decreased hearing is noted  Left Ear: Hearing, tympanic membrane, external ear and ear canal normal  No drainage, swelling or tenderness  No mastoid tenderness  No decreased hearing is noted  Nose: Nose normal    Mouth/Throat: Uvula is midline, oropharynx is clear and moist and mucous membranes are normal    Patient was able to hear better out of her right ear   Eyes: Pupils are equal, round, and reactive to light  Conjunctivae, EOM and lids are normal  Right eye exhibits no discharge  Left eye exhibits no discharge  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 , and only able to [facility] out shapes    Patient was able to follow provider and ED staff at their position in ED 26  Neck: Trachea normal, normal range of motion, full passive range of motion without pain and phonation normal  Neck supple  No JVD present  No tracheal tenderness, no spinous process tenderness and no muscular tenderness present  No neck rigidity   No tracheal deviation and normal range of motion present  Cardiovascular: Regular rhythm, normal heart sounds, intact distal pulses and normal pulses  Tachycardia present  Diastolic murmurs: distal extremity pulses intact and bounding   Pulses:       Radial pulses are 2+ on the right side, and 2+ on the left side  Dorsalis pedis pulses are 2+ on the right side, and 2+ on the left side  Posterior tibial pulses are 2+ on the right side, and 2+ on the left side  Pulmonary/Chest: Effort normal  No stridor  She has no decreased breath sounds  She has wheezes (mild expiratory wheezes) in the right middle field, the right lower field, the left middle field and the left lower field  She has no rhonchi  She has no rales  She exhibits no tenderness, no bony tenderness and no crepitus  Abdominal: Soft  Bowel sounds are normal  She exhibits no distension  There is no tenderness  There is no rigidity, no rebound, no guarding and no CVA tenderness  Musculoskeletal: Normal range of motion  Right lower leg: Normal         Left lower leg: Normal    Passive ROM intact  Upper and lower extremity 5/5 bilaterally  Neurovascularly intact  No grinding or clicking of joints         Lymphadenopathy:        Head (right side): No submental, no submandibular, no tonsillar, no preauricular, no posterior auricular and no occipital adenopathy present  Head (left side): No submental, no submandibular, no tonsillar, no preauricular, no posterior auricular and no occipital adenopathy present  She has no cervical adenopathy  Right cervical: No superficial cervical, no deep cervical and no posterior cervical adenopathy present  Left cervical: No superficial cervical, no deep cervical and no posterior cervical adenopathy present  Neurological: She is alert and oriented to person, place, and time  She has normal strength and normal reflexes  No sensory deficit  GCS eye subscore is 4  GCS verbal subscore is 5   GCS motor subscore is 6  Reflex Scores:       Patellar reflexes are 2+ on the right side and 2+ on the left side  Skin: Skin is warm and intact  Capillary refill takes less than 2 seconds  She is not diaphoretic  Psychiatric: She has a normal mood and affect  Her speech is normal and behavior is normal  Judgment and thought content normal  Cognition and memory are normal    Nursing note and vitals reviewed        Vital Signs  ED Triage Vitals   Temperature Pulse Respirations Blood Pressure SpO2   03/15/19 0457 03/15/19 0455 03/15/19 0455 03/15/19 0455 03/15/19 0455   98 8 °F (37 1 °C) 100 19 128/59 (!) 86 %      Temp Source Heart Rate Source Patient Position - Orthostatic VS BP Location FiO2 (%)   03/15/19 0457 03/15/19 0455 03/15/19 0455 03/15/19 0455 --   Oral Monitor Sitting Right arm       Pain Score       03/15/19 0530       6           Vitals:    03/15/19 0455 03/15/19 0507 03/15/19 0615 03/15/19 0623   BP: 128/59 125/59  102/50   Pulse: 100 100 92 92   Patient Position - Orthostatic VS: Sitting   Lying       qSOFA     Row Name 03/15/19 0623 03/15/19 0615 03/15/19 0507 03/15/19 0455       Altered mental status GCS < 15             Respiratory Rate > / =22  0  0  0  0     Systolic BP < / =225  0    0  0     Q Sofa Score  0  0  0  0           Visual Acuity      ED Medications  Medications   heparin (porcine) 25,000 units in 250 mL infusion (premix) (18 Units/kg/hr × 60 kg (Order-Specific) Intravenous New Bag 3/15/19 0628)   heparin (porcine) injection 4,800 Units (has no administration in time range)   heparin (porcine) injection 2,400 Units (has no administration in time range)   ondansetron (ZOFRAN) 4 mg/2 mL injection 4 mg (0 mg Does not apply Given to EMS 3/15/19 0459)   ondansetron (ZOFRAN-ODT) dispersible tablet 4 mg (4 mg Oral Given 3/15/19 0626)   heparin (porcine) injection 4,800 Units (4,800 Units Intravenous Given 3/15/19 0628)   iohexol (OMNIPAQUE) 350 MG/ML injection (MULTI-DOSE) 85 mL (85 mL Intravenous Given 3/15/19 0652)       Diagnostic Studies  Results Reviewed     Procedure Component Value Units Date/Time    Urine Microscopic [[id number]]  (Abnormal) Collected:  03/15/19 0650    Lab Status:  Final result Specimen:  Urine, Clean Catch Updated:  03/15/19 0707     RBC, UA 0-1 /hpf      WBC, UA 0-1 /hpf      Epithelial Cells Occasional /hpf      Bacteria, UA Occasional /hpf      Hyaline Casts, UA 2-4 /lpf      MUCUS THREADS Occasional    Troponin I [[id number]]     Lab Status:  No result Specimen:  Blood     UA w Reflex to Microscopic w Reflex to Culture [[id number]]  (Abnormal) Collected:  03/15/19 0650    Lab Status:  Final result Specimen:  Urine, Clean Catch Updated:  03/15/19 0655     Color, UA Yellow     Clarity, UA Clear     Specific [place], UA 1 020     pH, UA 6 0     Leukocytes, UA Trace     Nitrite, UA Negative     Protein, UA 30 (1+) mg/dl      Glucose, UA Negative mg/dl      Ketones, UA Negative mg/dl      Urobilinogen, UA 1 0 E U /dl      Bilirubin, UA Negative     Blood, UA Trace-Intact    Comprehensive metabolic panel [[id number]]  (Abnormal) Collected:  03/15/19 0454    Lab Status:  Final result Specimen:  Blood from Arm, Left Updated:  03/15/19 4224     Sodium 139 mmol/L      Potassium 4 9 mmol/L      Chloride 98 mmol/L      CO2 36 mmol/L      ANION GAP 5 mmol/L      BUN 16 mg/dL      Creatinine 0 65 mg/dL      Glucose 126 mg/dL      Calcium 9 4 mg/dL      AST 18 U/L      ALT 16 U/L      Alkaline Phosphatase 71 U/L      Total Protein 7 6 g/dL      Albumin 3 3 g/dL      Total Bilirubin 0 20 mg/dL      eGFR 80 ml/min/1 73sq m     Narrative:       National Kidney Disease Education Program recommendations are as follows:  GFR calculation is accurate only with a steady state creatinine  Chronic Kidney disease less than 60 ml/min/1 73 sq  meters  Kidney failure less than 15 ml/min/1 73 sq  meters      APTT [[id number]]  (Normal) Collected:  03/15/19 0454    Lab Status:  Final result Specimen: Blood from Arm, Left Updated:  03/15/19 0626     PTT 29 seconds     Protime-INR [[id number]]  (Normal) Collected:  03/15/19 0454    Lab Status:  Final result Specimen:  Blood from Arm, Left Updated:  03/15/19 0626     Protime 13 4 seconds      INR 1 03    D-Dimer [[id number]]  (Abnormal) Collected:  03/15/19 0454    Lab Status:  Final result Specimen:  Blood from Arm, Left Updated:  03/15/19 0556     D-Dimer, Quant 1,846 ng/ml (FEU)     Lactic acid x2 [[id number]]  (Normal) Collected:  03/15/19 0503    Lab Status:  Final result Specimen:  Blood from Arm, Right Updated:  03/15/19 0533     LACTIC ACID 1 6 mmol/L     Narrative:       Result may be elevated if tourniquet was used during collection  Troponin I [[id number]]  (Normal) Collected:  03/15/19 0454    Lab Status:  Final result Specimen:  Blood from Arm, Left Updated:  03/15/19 0528     Troponin I <0 02 ng/mL     Blood culture #2 [[id number]] Collected:  03/15/19 0506    Lab Status: In process Specimen:  Blood from Arm, Left Updated:  03/15/19 0510    Blood culture #1 [[id number]] Collected:  03/15/19 0503    Lab Status:   In process Specimen:  Blood from Arm, Right Updated:  03/15/19 0506    CBC and differential [[id number]]  (Abnormal) Collected:  03/15/19 0454    Lab Status:  Final result Specimen:  Blood from Arm, Left Updated:  03/15/19 0504     WBC 10 85 Thousand/uL      RBC 4 33 Million/uL      Hemoglobin 13 1 g/dL      Hematocrit 42 9 %      MCV 99 fL      MCH 30 3 pg      MCHC 30 5 g/dL      RDW 12 3 %      MPV 8 8 fL      Platelets 949 Thousands/uL      nRBC 0 /100 WBCs      Neutrophils Relative 71 %      Immat GRANS % 1 %      Lymphocytes Relative 19 %      Monocytes Relative 6 %      Eosinophils Relative 2 %      Basophils Relative 1 %      Neutrophils Absolute 7 78 Thousands/µL      Immature Grans Absolute 0 06 Thousand/uL      Lymphocytes Absolute 2 10 Thousands/µL      Monocytes Absolute 0 67 Thousand/µL      Eosinophils Absolute 0 18 Thousand/µL Basophils Absolute 0 06 Thousands/µL                  XR chest 2 views   Final Result by Slade Salazar MD (42/80 [id number])   Hiatal hernia  No acute cardiopulmonary disease  Workstation performed: [id number]         CTA ED chest PE study    (Results Pending)              Procedures  ECG 12 Lead Documentation  Date/Time: 3/15/2019 6:25 AM  Performed by: Lizbeth Herring PA-C  Authorized by: Lizbeth Herring PA-C     Indications / Diagnosis:  Left sided chest pain  ECG reviewed by me, the ED Provider: yes    Patient location:  ED  Previous ECG:     Previous ECG:  Compared to current    Comparison ECG info: When compared with ECG of October 14 2017; SD interval has decreased    Comparison to cardiac monitor: Yes    Interpretation:     Interpretation: normal    Rate:     ECG rate:  103    ECG rate assessment: tachycardic    Rhythm:     Rhythm: sinus tachycardia    Ectopy:     Ectopy: none    QRS:     QRS axis:  Normal    QRS intervals:  Normal  Conduction:     Conduction: normal    ST segments:     ST segments:  Normal  T waves:     T waves: normal             Phone Contacts  ED Phone Contact    ED Course  ED Course as of Mar 15 0713   Fri Mar 15, 2019   0526 Spoke with patient's daughter and discussed current ED course  Patient has DNR, and will go over DNR protocol when patient gets back from x-ray     WBC(!): 10 85   0559 D-DIMER QUANTITATIVE(!): 1,846   0606 Ordered Heparin drip as per TREV; Dr Randy Jung      3787 INR: 1 03   0640 Protime: 13 4   0640 PTT: 29         HEART Risk Score      Most Recent Value   History  1 Filed at: 03/15/2019 0512   ECG  0 Filed at: 03/15/2019 3523   Age  2 Filed at: 03/15/2019 0512   Risk Factors  2 Filed at: 03/15/2019 0512   Troponin  0 Filed at: 03/15/2019 0512   Heart Score Risk Calculator   History  1 Filed at: 03/15/2019 0512   ECG  0 Filed at: 03/15/2019 6211   Age  2 Filed at: 03/15/2019 0512   Risk Factors  2 Filed at: 03/15/2019 0512   Troponin  0 Filed at: 03/15/2019 2603   HEART Score  5 Filed at: 03/15/2019 0685   HEART Score  5 Filed at: 03/15/2019 2861                            MDM  Number of Diagnoses or Management Options  Chest pain, unspecified type: new and requires workup  Elevated d-dimer: new and requires workup  Nausea: minor  Diagnosis management comments: Patient verbalizes having a a history of four myocardial infarctions with the 1st myocardial infarction at [address] [facility]  D Dimer 1,846; CTA  ED chest PE study pending; Ordered Heparin Drip as verbally indicated by Dr Ely Mauricio, Internal Medicine   Initial troponin negative; delta troponin ordered  Initial ECG normal; delta ECG ordered  WBC 10 85; Lactic 1 6 to rule out systemic infectious process;  Blood culture x2 in process  Chest x-ray initial read with no acute cardiopulmonary disease  HEART score of 5  Urinalysis not collected at present; patient verbalizes not being able to urinate at this time  Spoke with Dr Ely Mauricio, Internal Medicine and both agreed to place patient on Inpatient Observational status  Patient placed on inpatient observational status under the care of Dr Ely Mauricio, Internal Medicine  Patient is hemodynamically stable and in no acute distress at time of final evaluation       Amount and/or Complexity of Data Reviewed  Clinical lab tests: ordered and reviewed  Tests in the radiology section of CPT®: ordered and reviewed  Review and summarize past medical records: yes  Discuss the patient with other providers: yes (Dr Sari Mauricio, Internal Medicine)    Risk of Complications, Morbidity, and/or Mortality  Presenting problems: moderate  Diagnostic procedures: high  Management options: high    Patient Progress  Patient progress: stable      Disposition  Final diagnoses:   Chest pain, unspecified type   Nausea   Elevated d-dimer     Time reflects when diagnosis was documented in both MDM as applicable and the Disposition within this note     Time User Action Codes Description Comment    3/15/2019  5:41 AM Ev Freedman Add [R07 9] Chest pain, unspecified type     3/15/2019  7:05 AM Ev Freedman Add [R11 0] Nausea     3/15/2019  7:05 AM Ev Freedman Add [R79 89] Elevated d-dimer       ED Disposition     ED Disposition Condition Date/Time Comment    Admit Stable Fri Mar 15, 2019  5:48 AM Case was discussed with Dr Padilla Pak and the patient's admission status was agreed to be Admission Status: observation status to the service of Dr Padilla Pak   Follow-up Information    None         Patient's Medications   Discharge Prescriptions    No medications on file     No discharge procedures on file      ED Provider  Electronically Signed by           aHrrison Acevedo PA-C  03/15/19 79 [facility] LAWRENCE Nielsen  03/15/19 0245

## 2019-03-15 NOTE — SOCIAL WORK
CM phoned patient's daughter-Mary Kate to inform of OBS status  CM mailed the OBS notice to address on facesheet  OBS is in the chart  Wesley Sage informed patient is a resident at [facility] and will return to [facility] once discharge  Nurse and SLIM notified

## 2019-03-15 NOTE — PLAN OF CARE
Problem: DISCHARGE PLANNING - CARE MANAGEMENT  Goal: Discharge to post-acute care or home with appropriate resources  Description  INTERVENTIONS:  - Conduct assessment to determine patient/family and health care team treatment goals, and need for post-acute services based on payer coverage, community resources, and patient preferences, and barriers to discharge  - Address psychosocial, clinical, and financial barriers to discharge as identified in assessment in conjunction with the patient/family and health care team  - Arrange appropriate level of post-acute services according to patient?s   needs and preference and payer coverage in collaboration with the physician and health care team  - Communicate with and update the patient/family, physician, and health care team regarding progress on the discharge plan  - Arrange appropriate transportation to post-acute venues  Outcome: Progressing   CM phoned patient's daughter-Mary Kate to inform of OBS status  CM mailed the OBS notice to address on facesheet  OBS is in the chart  Nelson Caldwell informed patient is a resident at [facility] and will return to [facility] once discharge  Nurse and SLIM notified

## 2019-03-16 VITALS
SYSTOLIC BLOOD PRESSURE: 124 MMHG | TEMPERATURE: 97.7 F | HEIGHT: 62 IN | RESPIRATION RATE: 18 BRPM | WEIGHT: 132.28 LBS | HEART RATE: 77 BPM | BODY MASS INDEX: 24.34 KG/M2 | DIASTOLIC BLOOD PRESSURE: 61 MMHG | OXYGEN SATURATION: 100 %

## 2019-03-16 LAB
ERYTHROCYTE [DISTWIDTH] IN BLOOD BY AUTOMATED COUNT: 12.6 % (ref 11.6–15.1)
HCT VFR BLD AUTO: 36.1 % (ref 34.8–46.1)
HGB BLD-MCNC: 11 G/DL (ref 11.5–15.4)
MCH RBC QN AUTO: 30.7 PG (ref 26.8–34.3)
MCHC RBC AUTO-ENTMCNC: 30.5 G/DL (ref 31.4–37.4)
MCV RBC AUTO: 101 FL (ref 82–98)
PLATELET # BLD AUTO: 318 THOUSANDS/UL (ref 149–390)
PMV BLD AUTO: 8.3 FL (ref 8.9–12.7)
RBC # BLD AUTO: 3.58 MILLION/UL (ref 3.81–5.12)
WBC # BLD AUTO: 8.06 THOUSAND/UL (ref 4.31–10.16)

## 2019-03-16 PROCEDURE — 85027 COMPLETE CBC AUTOMATED: CPT | Performed by: NURSE PRACTITIONER

## 2019-03-16 PROCEDURE — 99217 PR OBSERVATION CARE DISCHARGE MANAGEMENT: CPT | Performed by: NURSE PRACTITIONER

## 2019-03-16 PROCEDURE — 99214 OFFICE O/P EST MOD 30 MIN: CPT | Performed by: INTERNAL MEDICINE

## 2019-03-16 RX ORDER — FENTANYL 12 UG/H
1 PATCH TRANSDERMAL
Qty: 1 PATCH | Refills: 0 | Status: ON HOLD | OUTPATIENT
Start: 2019-03-16 | End: 2019-03-24 | Stop reason: SDUPTHER

## 2019-03-16 RX ADMIN — PANTOPRAZOLE SODIUM 40 MG: 40 TABLET, DELAYED RELEASE ORAL at 06:19

## 2019-03-16 RX ADMIN — CLOPIDOGREL BISULFATE 75 MG: 75 TABLET ORAL at 08:37

## 2019-03-16 RX ADMIN — DOCUSATE SODIUM 100 MG: 100 CAPSULE, LIQUID FILLED ORAL at 08:37

## 2019-03-16 RX ADMIN — ASPIRIN 81 MG: 81 TABLET, COATED ORAL at 08:37

## 2019-03-16 RX ADMIN — SODIUM CHLORIDE 75 ML/HR: 0.9 INJECTION, SOLUTION INTRAVENOUS at 03:23

## 2019-03-16 RX ADMIN — METOPROLOL TARTRATE 25 MG: 25 TABLET, FILM COATED ORAL at 08:37

## 2019-03-16 RX ADMIN — HEPARIN SODIUM 5000 UNITS: 5000 INJECTION INTRAVENOUS; SUBCUTANEOUS at 06:19

## 2019-03-16 NOTE — DISCHARGE SUMMARY
Discharge- Arcadia Kussmaul 5/12/1930, 80 y o  female MRN: [id number]    Unit/Bed#: -01 Encounter: [id number]    Primary Care Provider: Reina Simms DO   Date and time admitted to hospital: 3/15/2019  4:53 AM        * Chest pain  Assessment & Plan  Present on admission significant  Troponins negative x3  Cardiology evaluated recommended nuclear medicine stress test with normal study  Echocardiogram showing LVEF 55-65% no regional wall motion abnormalities  CTA was significant findings of large hiatal hernia with esophageal thickening suggestive in the right setting of esophagitis patient was initiated on a PPI was symptomatic improvement  CT further showed question of a right lower lobe pneumonia versus atelectasis procalcitonin is negative patient is asymptomatic no clinical evidence to support pneumonia at this time patient had a mild leukocytosis on admission suspect reactive now resolved  Patient is cleared for discharge back to [facility]      CAD (coronary artery disease)  Assessment & Plan  Plan per principal problem    HTN (hypertension)  Assessment & Plan  Continue home regimen with parameters    Hyperlipidemia  Assessment & Plan  Continue with statin    Centrilobular emphysema ([address] )  Assessment & Plan  Continue with current medications  P r n   Albuterol          Discharging Physician / Practitioner: SHELLEY Conner  PCP: Reina Simms DO  Admission Date:   Admission Orders (From admission, onward)    Ordered        03/15/19 0547  Place in Observation (expected length of stay for this patient is less than two midnights)  Once     Order ID Start Status   [id number] 03/15/19 0543 Completed              Discharge Date: 03/16/19    Resolved Problems  Date Reviewed: 3/15/2019    None          Consultations During Hospital Stay:  · Cardiology    Procedures Performed:   · Nuclear medicine stress test  · Echocardiogram    Significant Findings / Test Results:   · Atypical chest pain suspected in setting of esophagitis/large hiatal hernia cardiac workup negative    Incidental Findings:   · CTA with large hiatal hernia     Test Results Pending at Discharge (will require follow up): · None     Outpatient Tests Requested:  · None    Complications:  Another midnight in setting of waiting procalcitonin was suspicion on CTA of pneumonia low suspicion at discharge no antibiotics required    Reason for Admission:  Chest pain    Hospital Course: Stefano Victor is a 80 y o  female patient who originally presented to the hospital on 3/15/2019 due to experiencing chest pain in patient with known history of stroke MI hypertension hyperlipidemia COPD that awoke patient from her sleep 04:00  Patient brought to the ED from [facility] due to a chest pain event that [facility] patient for a m  Patient had negative troponins x3 cardiac evaluation recommended nuclear medicine stress test that was normal echocardiogram performed that showed LVEF 55-65% no regional wall motion abnormalities  CTA showed further large hiatal hernia and some esophageal thickening suggestive of esophagitis in the right setting  She was initiated on a PPI and symptomatically improved  Patient had on CTA however showing a right lower lobe pneumonia versus atelectasis was held to await procalcitonin in light of having a mild leukocytosis procalcitonin was normal and leukocytosis resolved Likely suspecting reactive  Given negative cardiac workup no significant signs of pneumonia patient was discharged back to her care facility on a PPI  Granddaughter who is been helping the daughter aware plan of care and agrees with discharge back to facility today  Please see above list of diagnoses and related plan for additional information       Condition at Discharge: good     Discharge Day Visit / Exam:     Subjective:  Patient alert and pleasant denies any further chest pain denies shortness of breath above her baseline and overall clinically states she feels well and feels ready to return to her facility given the findings of her workup  Call to granddaughter updated plan of care given cardiac workup findings head CT findings were discussed agreeable with plan of care return to the personal care facility  Vitals: Blood Pressure: 124/61 (03/16/19 0729)  Pulse: 77 (03/16/19 0315)  Temperature: 97 7 °F (36 5 °C) (03/16/19 0729)  Temp Source: Oral (03/16/19 0729)  Respirations: 18 (03/16/19 0729)  Height: 5' 2" (157 5 cm) (03/15/19 1450)  Weight - Scale: 60 kg (132 lb 4 4 oz) (03/15/19 1450)  SpO2: 100 % (03/16/19 0315)  Exam:   Physical Exam   Constitutional: She is oriented to person, place, and time  HENT:   Head: Normocephalic and atraumatic  Eyes: Conjunctivae and EOM are normal    Neck: Normal range of motion  Cardiovascular: Normal rate, regular rhythm and normal heart sounds  Pulmonary/Chest: Effort normal and breath sounds normal    Abdominal: Soft  Bowel sounds are normal    Musculoskeletal: Normal range of motion  Neurological: She is alert and oriented to person, place, and time  Skin: Skin is warm and dry  Psychiatric: She has a normal mood and affect  Her behavior is normal        Discussion with Family:  Granddaughter    Discharge instructions/Information to patient and family:   See after visit summary for information provided to patient and family  Provisions for Follow-Up Care:  See after visit summary for information related to follow-up care and any pertinent home health orders  Disposition:     Other [facility] [address] [facility]:   · Not Applicable to this Patient - Not Applicable to this Patient    Planned Readmission:  None     Discharge Statement:  I spent 33 minutes discharging the patient  This time was spent on the day of discharge  I had direct contact with the patient on the day of discharge   Greater than 50% of the total time was spent examining patient, answering all patient questions, arranging and discussing plan of care with patient as well as directly providing post-discharge instructions  Additional time then spent on discharge activities  Discharge Medications:  See after visit summary for reconciled discharge medications provided to patient and family        ** Please Note: This note has been constructed using a voice recognition system **

## 2019-03-16 NOTE — DISCHARGE INSTRUCTIONS
Chest Pain   WHAT YOU NEED TO KNOW:   Chest pain can be caused by a range of conditions, from not serious to life-threatening  Chest pain can be a symptom of a digestive problem, such as acid reflux or a stomach ulcer  An anxiety attack or a strong emotion, such as anger, can also cause chest pain  Infection, inflammation, or a fracture in the bones or cartilage in your chest can cause pain or discomfort  Sometimes chest pain or pressure is caused by poor blood flow to your heart (angina)  Chest pain may also be caused by life-threatening conditions such as a heart attack or blood clot in your lungs  DISCHARGE INSTRUCTIONS:   Call 911 if:   · You have any of the following signs of a heart attack:      ¨ Squeezing, pressure, or pain in your chest that lasts longer than 5 minutes or returns    ¨ Discomfort or pain in your back, neck, jaw, stomach, or arm     ¨ Trouble breathing    ¨ Nausea or vomiting    ¨ Lightheadedness or a sudden cold sweat, especially with chest pain or trouble breathing    Seek care immediately if:   · You have chest discomfort that gets worse, even with medicine  · You cough or vomit blood  · Your bowel movements are black or bloody  · You cannot stop vomiting, or it hurts to swallow  Contact your healthcare provider if:   · You have questions or concerns about your condition or care  Medicines:   · Medicines  may be given to treat the cause of your chest pain  Examples include pain medicine, anxiety medicine, or medicines to increase blood flow to your heart  · Do not take certain medicines without asking your healthcare provider first   These include NSAIDs, herbal or vitamin supplements, or hormones (estrogen or progestin)  · Take your medicine as directed  Contact your healthcare provider if you think your medicine is not helping or if you have side effects  Tell him or her if you are allergic to any medicine   Keep a list of the medicines, vitamins, and herbs you take  Include the amounts, and when and why you take them  Bring the list or the pill bottles to follow-up visits  Carry your medicine list with you in case of an emergency  Follow up with your healthcare provider within 72 hours, or as directed: You may need to return for more tests to find the cause of your chest pain  You may be referred to a specialist, such as a cardiologist or gastroenterologist  Write down your questions so you remember to ask them during your visits  Healthy living tips: The following are general healthy guidelines  If your chest pain is caused by a heart problem, your healthcare provider will give you specific guidelines to follow  · Do not smoke  Nicotine and other chemicals in cigarettes and cigars can cause lung and heart damage  Ask your healthcare provider for information if you currently smoke and need help to quit  E-cigarettes or smokeless tobacco still contain nicotine  Talk to your healthcare provider before you use these products  · Eat a variety of healthy, low-fat foods  Healthy foods include fruits, vegetables, whole-grain breads, low-fat dairy products, beans, lean meats, and fish  Ask for more information about a heart healthy diet  · Ask about activity  Your healthcare provider will tell you which activities to limit or avoid  Ask when you can drive, return to work, and have sex  Ask about the best exercise plan for you  · Maintain a healthy weight  Ask your healthcare provider how much you should weigh  Ask him or her to help you create a weight loss plan if you are overweight  © 2017 [address] [employer] Information is for End User's use only and may not be sold, redistributed or otherwise used for commercial purposes  All illustrations and images included in CareNotes® are the copyrighted property of [employer]  or [employer] Dorado  The above information is an  only   It is not intended as medical advice for individual conditions or treatments  Talk to your doctor, nurse or pharmacist before following any medical regimen to see if it is safe and effective for you  Chronic Hypertension   WHAT YOU NEED TO KNOW:   Hypertension is high blood pressure (BP)  Your BP is the force of your blood moving against the walls of your arteries  Normal BP is less than 120/80  Prehypertension is between 120/80 and 139/89  Hypertension is 140/90 or higher  Hypertension causes your BP to get so high that your heart has to work much harder than normal  This can damage your heart  Chronic hypertension is a long-term condition that you can control with a healthy lifestyle or medicines  A controlled blood pressure helps protect your organs, such as your heart, lungs, brain, and kidneys  DISCHARGE INSTRUCTIONS:   Call 911 for any of the following:   · You have discomfort in your chest that feels like squeezing, pressure, fullness, or pain  · You become confused or have difficulty speaking  · You suddenly feel lightheaded or have trouble breathing  · You have pain or discomfort in your back, neck, jaw, stomach, or arm  Seek care immediately if:   · You have a severe headache or vision loss  · You have weakness in an arm or leg  Contact your healthcare provider if:   · You feel faint, dizzy, confused, or drowsy  · You have been taking your BP medicine and your BP is still higher than your healthcare provider says it should be  · You have questions or concerns about your condition or care  Medicines: You may need any of the following:  · Medicine  may be used to help lower your BP  You may need more than one type of medicine  Take the medicine exactly as directed  · Diuretics  help decrease extra fluid that collects in your body  This will help lower your BP  You may urinate more often while you take this medicine  · Cholesterol medicine  helps lower your cholesterol level   A low cholesterol level helps prevent heart disease and makes it easier to control your blood pressure  · Take your medicine as directed  Contact your healthcare provider if you think your medicine is not helping or if you have side effects  Tell him or her if you are allergic to any medicine  Keep a list of the medicines, vitamins, and herbs you take  Include the amounts, and when and why you take them  Bring the list or the pill bottles to follow-up visits  Carry your medicine list with you in case of an emergency  Follow up with your healthcare provider as directed: You will need to return to have your blood pressure checked and to have other lab tests done  Write down your questions so you remember to ask them during your visits  Manage chronic hypertension:  Talk with your healthcare provider about these and other ways to manage hypertension:  · Take your BP at home  Sit and rest for 5 minutes before you take your BP  Extend your arm and support it on a flat surface  Your arm should be at the same level as your heart  Follow the directions that came with your BP monitor  If possible, take at least 2 BP readings each time  Take your BP at least twice a day at the same times each day, such as morning and evening  Keep a record of your BP readings and bring it to your follow-up visits  Ask your healthcare provider what your blood pressure should be  · Limit sodium (salt) as directed  Too much sodium can affect your fluid balance  Check labels to find low-sodium or no-salt-added foods  Some low-sodium foods use potassium salts for flavor  Too much potassium can also cause health problems  Your healthcare provider will tell you how much sodium and potassium are safe for you to have in a day  He or she may recommend that you limit sodium to 2,300 mg a day  · Follow the meal plan recommended by your healthcare provider    A dietitian or your provider can give you more information on low-sodium plans or the DASH (Dietary Approaches to Stop Hypertension) eating plan  The DASH plan is low in sodium, unhealthy fats, and total fat  It is high in potassium, calcium, and fiber  · Exercise to maintain a healthy weight  Exercise at least 30 minutes per day, on most days of the week  This will help decrease your blood pressure  Ask about the best exercise plan for you  · Decrease stress  This may help lower your BP  Learn ways to relax, such as deep breathing or listening to music  · Limit alcohol  Women should limit alcohol to 1 drink a day  Men should limit alcohol to 2 drinks a day  A drink of alcohol is 12 ounces of beer, 5 ounces of wine, or 1½ ounces of liquor  · Do not smoke  Nicotine and other chemicals in cigarettes and cigars can increase your BP and also cause lung damage  Ask your healthcare provider for information if you currently smoke and need help to quit  E-cigarettes or smokeless tobacco still contain nicotine  Talk to your healthcare provider before you use these products  © 2017 [address]  Information is for End User's use only and may not be sold, redistributed or otherwise used for commercial purposes  All illustrations and images included in CareNotes® are the copyrighted property of [employer]  or [employer] Dorado  The above information is an  only  It is not intended as medical advice for individual conditions or treatments  Talk to your doctor, nurse or pharmacist before following any medical regimen to see if it is safe and effective for you

## 2019-03-16 NOTE — ASSESSMENT & PLAN NOTE
Present on admission significant  Troponins negative x3  Cardiology evaluated recommended nuclear medicine stress test with normal study  Echocardiogram showing LVEF 55-65% no regional wall motion abnormalities  CTA was significant findings of large hiatal hernia with esophageal thickening suggestive in the right setting of esophagitis patient was initiated on a PPI was symptomatic improvement  CT further showed question of a right lower lobe pneumonia versus atelectasis procalcitonin is negative patient is asymptomatic no clinical evidence to support pneumonia at this time patient had a mild leukocytosis on admission suspect reactive now resolved  Patient is cleared for discharge back to [facility]

## 2019-03-16 NOTE — PROGRESS NOTES
General Cardiology   Progress Note   Bill Breed 80 y o  female MRN: [id number]  Unit/Bed#: -01 Encounter: [id number]        Subjective:    Patient seen and examined  No significant events overnight  Patient states that her chest pain is now resolved  REVIEW OF SYSTEMS:  Constitutional:  Denies fever or chills   Eyes:  Denies change in visual acuity   HENT:  Denies nasal congestion or sore throat   Respiratory:  Denies cough or shortness of breath   Cardiovascular:  Positive for chest pain, denies edema   GI:  Denies abdominal pain, nausea, vomiting, bloody stools or diarrhea   :  Denies dysuria, frequency, difficulty in micturition and nocturia  Musculoskeletal:  Denies back pain or joint pain   Neurologic:  Denies headache, focal weakness or sensory changes   Endocrine:  Denies polyuria or polydipsia   Lymphatic:  Denies swollen glands   Psychiatric:  Denies depression or anxiety     Objective:   Vitals:  Vitals:    03/16/19 0729   BP: 124/61   Pulse:    Resp: 18   Temp: 97 7 °F (36 5 °C)   SpO2:        Body mass index is 24 19 kg/m²  Systolic (37KPZ), CML:221 , Min:105 , BQA:625     Diastolic (05ZDP), IOT:74, Min:49, Max:85      Intake/Output Summary (Last 24 hours) at 3/16/2019 1225  Last data filed at 3/16/2019 0941  Gross per 24 hour   Intake 1875 ml   Output 950 ml   Net 925 ml     Weight (last 2 days)     Date/Time   Weight    03/15/19 1450   60 (132 28)    03/15/19 0508   61 2 (134 92)              Telemetry Review:   Normal sinus rhythm with a rate in the 60s      PHYSICAL EXAMS:  General:  Patient is not in acute distress, laying in the bed comfortably, awake, alert responding to commands  Head: Normocephalic, Atraumatic  HEENT: White sclera, pink conjunctiva,  PERRLA,pharynx benign  Neck:  Supple, no neck vein distention  Respiratory: clear to P/A  Cardiovascular:  PMI normal, S1-S2 normal, No  Murmurs, thrills, gallops, rubs   Regular rhythm  GI:  Abdomen soft nontender   No hepatosplenomegaly, adenopathy, ascites,or rebound tenderness  Extremities: No edema, normal pulses, no calf tenderness, no joint deformities, no venous disease   Integument:  No skin rashes or ulceration  Lymphatic:  No cervical or inguinal lymphadenopathy  Neurologic:  Patient is awake alert, responding to command, well-oriented to time and place and person moving all extremities      LABORATORY RESULTS:  Results from last 7 days   Lab Units 03/15/19  0816 03/15/19  0454   TROPONIN I ng/mL <0 02 <0 02     CBC with diff:   Results from last 7 days   Lab Units 03/16/19  0533 03/15/19  0454   WBC Thousand/uL 8 06 10 85*   HEMOGLOBIN g/dL 11 0* 13 1   HEMATOCRIT % 36 1 42 9   MCV fL 101* 99*   PLATELETS Thousands/uL 318 422*   MCH pg 30 7 30 3   MCHC g/dL 30 5* 30 5*   RDW % 12 6 12 3   MPV fL 8 3* 8 8*   NRBC AUTO /100 WBCs  --  0       CMP:  Results from last 7 days   Lab Units 03/15/19  0454   POTASSIUM mmol/L 4 9   CHLORIDE mmol/L 98*   CO2 mmol/L 36*   BUN mg/dL 16   CREATININE mg/dL 0 65   CALCIUM mg/dL 9 4   AST U/L 18   ALT U/L 16   ALK PHOS U/L 71   EGFR ml/min/1 73sq m 80       BMP:  Results from last 7 days   Lab Units 03/15/19  0454   POTASSIUM mmol/L 4 9   CHLORIDE mmol/L 98*   CO2 mmol/L 36*   BUN mg/dL 16   CREATININE mg/dL 0 65   CALCIUM mg/dL 9 4                          Results from last 7 days   Lab Units 03/15/19  0454   INR  1 03       Lipid Profile:   Lab Results   Component Value Date    CHOL 161 06/03/2015     Lab Results   Component Value Date    HDL 74 (H) 02/24/2017    HDL 73 06/03/2015     Lab Results   Component Value Date    LDLCALC 48 02/24/2017    LDLCALC 69 06/03/2015     Lab Results   Component Value Date    TRIG 111 02/24/2017    TRIG 96 06/03/2015       Cardiac testing:   Results for orders placed during the hospital encounter of 03/15/19   Echo complete with contrast if indicated    Narrative [address] [place] [address] [address] [facility] [address] [address]  [phone]    Transthoracic Echocardiogram  2D, M-mode, Doppler, and Color Doppler    Study date:  15-Mar-2019    Patient: Cinda Rivera  MR number: [id number]  Account number: [de-identified]  : 12-May-1930  Age: 80 years  Gender: Female  Status: Outpatient  Location: Bedside  Height: 62 in  Weight: 134 lb  BP: 104/ 49 mmHg    Indications: Unstable angina    Diagnoses: R07 9 - Chest pain, unspecified    Sonographer:  Gregg Rutherford RDCS  Referring Physician:  SHELLEY Moore  Group:  [facility]  Interpreting Physician:  Lino Bob MD    SUMMARY    LEFT VENTRICLE:  Systolic function was hyperdynamic  Ejection fraction was estimated to be 65 %  There were no regional wall motion abnormalities  There was mild concentric hypertrophy  Doppler parameters were consistent with abnormal left ventricular relaxation (grade 1 diastolic dysfunction)  AORTIC VALVE:  There was mild regurgitation  TRICUSPID VALVE:  There was mild regurgitation  Estimated peak PA pressure was 45 mmHg  PULMONIC VALVE:  There was moderate regurgitation  HISTORY: Symptoms: chest pain  PRIOR HISTORY: Myocardial infarction  Risk factors: hypertension and hypercholesterolemia  Cerebrovascular disease  Chronic lung disease  PROCEDURE: The procedure was performed at the bedside  This was a routine study  The transthoracic approach was used  The study included complete 2D imaging, M-mode, complete spectral Doppler, and color Doppler  The heart rate was 68 bpm,  at the start of the study  Images were obtained from the parasternal, apical, subcostal, and suprasternal notch acoustic windows  Echocardiographic views were limited due to poor acoustic window availability and lung interference  Image  quality was adequate  LEFT VENTRICLE: Size was normal  Systolic function was hyperdynamic  Ejection fraction was estimated to be 65 %  There were no regional wall motion abnormalities   There was mild concentric hypertrophy  DOPPLER: Doppler parameters were  consistent with abnormal left ventricular relaxation (grade 1 diastolic dysfunction)  RIGHT VENTRICLE: The size was normal  Systolic function was normal  Wall thickness was normal     LEFT ATRIUM: Size was normal     RIGHT ATRIUM: Size was normal     MITRAL VALVE: Valve structure was normal  There was normal leaflet separation  DOPPLER: The transmitral velocity was within the normal range  There was no evidence for stenosis  There was no regurgitation  AORTIC VALVE: The valve was trileaflet  Leaflets exhibited normal thickness and normal cuspal separation  DOPPLER: Transaortic velocity was within the normal range  There was no evidence for stenosis  There was mild regurgitation  TRICUSPID VALVE: The valve structure was normal  There was normal leaflet separation  DOPPLER: The transtricuspid velocity was within the normal range  There was no evidence for stenosis  There was mild regurgitation  Estimated peak PA  pressure was 45 mmHg  PULMONIC VALVE: Leaflets exhibited normal thickness, no calcification, and normal cuspal separation  DOPPLER: The transpulmonic velocity was within the normal range  There was moderate regurgitation  PERICARDIUM: There was no pericardial effusion  The pericardium was normal in appearance  AORTA: The root exhibited normal size  SYSTEMIC VEINS: IVC: The inferior vena cava was normal in size and course   Respirophasic changes were normal     SYSTEM MEASUREMENT TABLES    2D  %FS: 33 9 %  Ao Diam: 3 1 cm  EDV(Teich): 50 3 ml  EF(Teich): 63 9 %  ESV(Teich): 18 2 ml  IVSd: 1 3 cm  LA Area: 15 2 cm2  LA Diam: 2 2 cm  LVEDV MOD A4C: 69 2 ml  LVEF MOD A4C: 60 3 %  LVESV MOD A4C: 27 5 ml  LVIDd: 3 5 cm  LVIDs: 2 3 cm  LVLd A4C: 6 9 cm  LVLs A4C: 5 7 cm  LVPWd: 1 2 cm  RA Area: 13 7 cm2  RVIDd: 3 8 cm  SV MOD A4C: 41 7 ml  SV(Teich): 32 2 ml    CW  AR Dec Conway: 3 m/s2  AR Dec Time: 1480 9 ms  AR PHT: 429 5 ms  AR Vmax: 4 5 m/s  AR maxP 9 mmHg  TR Vmax: 3 4 m/s  TR maxP 7 mmHg    MM  TAPSE: 2 3 cm    PW  E': 0 1 m/s  E/E': 12  MV A Rubens: 1 2 m/s  MV Dec Evangeline: 2 m/s2  MV DecT: 344 3 ms  MV E Rubens: 0 7 m/s  MV E/A Ratio: 0 6  MV PHT: 99 8 ms  MVA By PHT: 2 2 cm2    [employer] Accredited Echocardiography Laboratory    Prepared and electronically signed by    Kemar Tinoco MD  Signed 15-Mar-2019 18:20:01           Meds/Allergies   all current active meds have been reviewed  Medications Prior to Admission   Medication    acetaminophen (TYLENOL) 325 mg tablet    aspirin (ECOTRIN LOW STRENGTH) 81 mg EC tablet    bisacodyl (DULCOLAX) 10 mg suppository    clopidogrel (PLAVIX) 75 mg tablet    docusate sodium (COLACE) 100 mg capsule    fentaNYL (DURAGESIC) 12 mcg/hr TD 72 hr patch    magnesium hydroxide (MILK OF MAGNESIA) 400 mg/5 mL oral suspension    melatonin 3 mg    menthol-cetylpyridinium (CEPACOL) 3 MG lozenge    methocarbamol (ROBAXIN) 750 mg tablet    metoprolol tartrate (LOPRESSOR) 25 mg tablet    ondansetron (ZOFRAN) 4 mg tablet    pantoprazole (PROTONIX) 40 mg tablet    rosuvastatin (CRESTOR) 10 MG tablet            Assessment/Plan:  1  Chest pain in a patient with known CAD/stenting (the last 1 in ), noncardiac in origin, suspect GI related  -echocardiogram normal and stress test negative for ischemia  - continue aspirin, Lipitor, Plavix and metoprolol tartrate  -continue cardiac diet    2  History of CAD with stenting  -continue aspirin, Lipitor, Plavix and metoprolol tartrate  -continue cardiac diet    3  Hypertension, currently controlled  -continue monitor blood pressures    4  Hyperlipidemia  -continue Lipitor    Cardiology will sign off on this case  Please feel free to consult us for any further issues  Counseling / Coordination of Care  Total floor / unit time spent today 25 minutes    Greater than 50% of total time was spent with the patient and / or family counseling and / or coordination of care  ** Please Note: Dragon [employer] Dictation voice to text software may have been used in the creation of this document   **

## 2019-03-16 NOTE — SOCIAL WORK
CM called Cristobal Nunn from [facility] and obtained 1pm transport from [facility] to [place] [address]  Patient is on 2LO2 and will go via [facility] due to not qualifying for BLS  Patient insurance require auth and she does not meet criteria at this time for BLS transport  Report number placed in facility contacts  Nursing made aware

## 2019-03-16 NOTE — UTILIZATION REVIEW
Initial Clinical Review    Admission: Date/Time/Statement: OBSERVATION 3/15/19 @ 0547    Orders Placed This Encounter   Procedures    Place in Observation (expected length of stay for this patient is less than two midnights)     Standing Status:   Standing     Number of Occurrences:   1     Order Specific Question:   Admitting Physician     Answer:   Ramon Espinoza     Order Specific Question:   Level of Care     Answer:   Med Surg [16]     ED: Date/Time/Mode of Arrival:   ED Arrival Information     Expected Arrival Acuity Means of Arrival Escorted By Service Admission Type    - 3/15/2019 04:52 Emergent Ambulance [address]  [facility] General Medicine Emergency    Arrival Complaint    chest pain        Chief Complaint:   Chief Complaint   Patient presents with    Chest Pain     Patient stated that she has been having left sided chest pain for the last hour  Hx of four MI  Assessment/Plan: MRS GRIJALVA IS AN 89 YO FEMALE WHO PRESENTS TO THE ED FROM THE NURSING HOME WHERE SHE RESIDES FOR C/O NON-RADIATING, SHARP, PRESSURE-LIKE CHEST PAIN STARTING AT 0400 ASSOCIATED WITH NAUSEA, DIZZINESS AND NO VOMITING  PMH INCLUDES CAD, COPD ON OXYGEN AND HTN  TROPONINS WERE NEGATIVE  D DIMER WAS ELEVATED, UNSTABLE ANGINA - HEPARIN DRIP, ASPIRIN AND PLAVIX, CONSULT TO CARDIO, GET ECHO, START PT ON STATIN AND CONTINUE BETA BLOCKER  CENTRILOBULAR EMPHYSEMA - CONTINUE HOME MEDS AND PRN ALBUTEROL  [address] [facility]  HTN - CONTINUE HOME MEDS AND TITRATE AS NEEDED        ED Vital Signs:   ED Triage Vitals   Temperature Pulse Respirations Blood Pressure SpO2   03/15/19 0457 03/15/19 0455 03/15/19 0455 03/15/19 0455 03/15/19 0455   98 8 °F (37 1 °C) 100 19 128/59 (!) 86 %      Temp Source Heart Rate Source Patient Position - Orthostatic VS BP Location FiO2 (%)   03/15/19 0457 03/15/19 0455 03/15/19 0455 03/15/19 0455 --   Oral Monitor Sitting Right arm       Pain Score       03/15/19 0530       6 Wt Readings from Last 1 Encounters:   03/15/19 60 kg (132 lb 4 4 oz)     Vital Signs (abnormal):   03/15/19 2332  97 16 °F (36 2 °C)Abnormal   86  18  110/50  97 %     03/15/19 1922  98 06 °F (36 7 °C)  78  18  107/49Abnormal   92 %     03/15/19 0748  97 5 °F (36 4 °C)  77  18  104/49Abnormal   93 %     03/15/19 0455    100  19  128/59  86 %Abnormal   Nasal cannula     Pertinent Labs/Diagnostic Test Results:   CL 98  CO2 36  ALB 3 3  TROP 0 02 X 2   WBC 10 85  HGB 11 0  PLATELETS 574  D DIMER 1846  COLOR Yellow      Clarity, UA Clear   SL AMB SPECIFIC GRAVITY_URINE 1 020   Glucose, UA Negat    Ketones, UA Negat    Blood, UA Trace    Nitrite, UA Negat    Leukocytes, UA Trace   pH, UA 6 0   POCT URINE PROTEIN 30 (1+)   Bilirubin, UA Negat    SL AMB POCT UROBILINOGEN 1 0   RBC, UA 0-1   WBC, UA 0-1   Bacteria, UA Occas    Hyaline Casts, UA 2-     3/15 CXR - Hiatal hernia  No acute cardiopulmonary disease  3/15 CTA ED CHEST PE STUDY - 1  No CT evidence of pulmonary embolism  2   Right lower lobe infiltrate representing atelectasis or pneumonia  3   Large hiatal hernia with diffuse thickening of the esophagus, increased from the prior study  Correlate for reflux esophagitis  Recommend follow-up upper endoscopy      3/15 ECG - Sinus tachycardia  Left posterior fascicular block  Abnormal ECG  When compared with ECG of 14-OCT-2017 19:16,  RI interval has decreased    3/15 ECG - Sinus rhythm with occasional Premature ventricular complexes  Right axis deviation  Pulmonary disease pattern    3/15 NM STRESS TEST - IMPRESSIONS: Normal study  Myocardial perfusion imaging was normal at rest and with stress   Left ventricular systolic function was normal      3/15 CARDIAC ECHO - EF 65%  LV MILD CONCENTRIC HYPERTROPHY, LV RELAXATION - GRADE 1 DIASTOLIC DYSFUNCTION  AV, TV, PV - MILD REGURG      ED Treatment:   Medication Administration from 03/15/2019 0452 to 03/15/2019 7780    Date/Time Order Dose Route Action   03/15/2019 0626 ondansetron (ZOFRAN-ODT) dispersible tablet 4 mg 4 mg Oral Given   03/15/2019 0628 heparin (porcine) injection 4,800 Units 4,800 Units Intravenous Given   03/15/2019 0706 heparin (porcine) 25,000 units in 250 mL infusion (premix) 18 Units/kg/hr Intravenous Handoff   03/15/2019 0628 heparin (porcine) 25,000 units in 250 mL infusion (premix) 18 Units/kg/hr Intravenous New Bag   03/15/2019 0652 iohexol (OMNIPAQUE) 350 MG/ML injection (MULTI-DOSE) 85 mL 85 mL Intravenous Given        Past Medical/Surgical History:    Active Ambulatory Problems     Diagnosis Date Noted    HTN (hypertension) 10/15/2017    CAD (coronary artery disease) 10/15/2017    Hyperlipidemia 01/31/2014    Generalized osteoarthritis of multiple sites 10/12/2016    Gait disturbance 11/01/2017    Centrilobular emphysema ([facility] [address] ) 02/18/2016    Bilateral hearing loss 11/01/2017    PVD (peripheral vascular disease) ([facility] [address] ) 09/28/2016    Legally blind 11/01/2017     Resolved Ambulatory Problems     Diagnosis Date Noted    COPD exacerbation ([facility] [address] ) 10/15/2017     Past Medical History:   Diagnosis Date    Cardiac disease     COPD (chronic obstructive pulmonary disease) ([facility] [address] )     Hyperlipidemia     Hypertension     MI (myocardial infarction) ([facility] [address] )     PVD (peripheral vascular disease) ([facility] [address] )     Stroke ([facility] [address] )      Admitting Diagnosis: Nausea [R11 0]  Chest pain [R07 9]  Elevated d-dimer [R79 89]  Chest pain, unspecified type [R07 9]     Age/Sex: 80 y o  female     Admission Orders:  Scheduled Meds:   Current Facility-Administered Medications:  albuterol 2 5 mg Nebulization Q4H PRN   aspirin 81 mg Oral Daily   atorvastatin 40 mg Oral Daily With Dinner   clopidogrel 75 mg Oral Daily   docusate sodium 100 mg Oral BID   fentaNYL 12 mcg Transdermal Q72H   heparin (porcine) 5,000 Units Subcutaneous Q8H [address]   magnesium hydroxide 30 mL Oral Daily PRN   melatonin 3 mg Oral HS   methocarbamol 500 mg Oral Q6H PRN   metoprolol tartrate 25 mg Oral Q12H GABRIELA   morphine injection 2 mg Intravenous Q1H PRN   nitroglycerin 0 4 mg Sublingual Q5 Min PRN   ondansetron 4 mg Intravenous Q6H PRN   pantoprazole 40 mg Oral Daily     Continuous Infusions:  HEPARIN DRIP TITRATING TO PTT - ON 3/15 AND STOPPED 3/15 @ 1100  IV 0 9% NSS @ 75 ML/HR     PRN Meds:   albuterol    magnesium hydroxide    methocarbamol    morphine injection    nitroglycerin    Ondansetron  XOPENEX X 1    TELE  SCDs  VITALS Q 4 HR   CARDIAC DIET   CONS CARDIO     Network Utilization Review Department  Phone: [phone]; Fax [phone]  [email or web address]  ATTENTION: Please call with any questions or concerns to [phone]  and carefully listen to the prompts so that you are directed to the right person  Send all requests for admission clinical reviews, approved or denied determinations and any other requests to fax [phone]   All voicemails are confidential

## 2019-03-16 NOTE — SOCIAL WORK
Per SLIM pt clear to do  CM s/w ISHA Sultana who reported pt can return  Nurse to call report [phone]

## 2019-03-16 NOTE — PLAN OF CARE
Problem: DISCHARGE PLANNING - CARE MANAGEMENT  Goal: Discharge to post-acute care or home with appropriate resources  Description  INTERVENTIONS:  - Conduct assessment to determine patient/family and health care team treatment goals, and need for post-acute services based on payer coverage, community resources, and patient preferences, and barriers to discharge  - Address psychosocial, clinical, and financial barriers to discharge as identified in assessment in conjunction with the patient/family and health care team  - Arrange appropriate level of post-acute services according to patient?s   needs and preference and payer coverage in collaboration with the physician and health care team  - Communicate with and update the patient/family, physician, and health care team regarding progress on the discharge plan  - Arrange appropriate transportation to post-acute venues  Outcome: Progressing  Note:   CM called Chelle Wolf from [facility] and obtained 1pm transport from [facility] to [place] [address]  Patient is on 2LO2 and will go via [facility] due to not qualifying for BLS  Patient insurance require auth and she does not meet criteria at this time for BLS transport  Report number placed in facility contacts  Nursing made aware

## 2019-03-18 ENCOUNTER — TRANSITIONAL CARE MANAGEMENT (OUTPATIENT)
Dept: INTERNAL MEDICINE CLINIC | Facility: CLINIC | Age: 84
End: 2019-03-18

## 2019-03-20 LAB
BACTERIA BLD CULT: NORMAL
BACTERIA BLD CULT: NORMAL

## 2019-03-23 ENCOUNTER — APPOINTMENT (EMERGENCY)
Dept: RADIOLOGY | Facility: HOSPITAL | Age: 84
End: 2019-03-23
Payer: COMMERCIAL

## 2019-03-23 ENCOUNTER — HOSPITAL ENCOUNTER (OUTPATIENT)
Facility: HOSPITAL | Age: 84
Setting detail: OBSERVATION
Discharge: NURSING, REHAB OR LONG-TERM CARE | End: 2019-03-24
Attending: EMERGENCY MEDICINE | Admitting: INTERNAL MEDICINE
Payer: COMMERCIAL

## 2019-03-23 DIAGNOSIS — J96.21 ACUTE ON CHRONIC RESPIRATORY FAILURE WITH HYPOXIA AND HYPERCAPNIA (HCC): ICD-10-CM

## 2019-03-23 DIAGNOSIS — G89.29 CHRONIC PAIN: ICD-10-CM

## 2019-03-23 DIAGNOSIS — J96.22 ACUTE ON CHRONIC RESPIRATORY FAILURE WITH HYPOXIA AND HYPERCAPNIA (HCC): ICD-10-CM

## 2019-03-23 DIAGNOSIS — J43.2 CENTRILOBULAR EMPHYSEMA (HCC): ICD-10-CM

## 2019-03-23 DIAGNOSIS — J44.1 COPD EXACERBATION (HCC): Primary | ICD-10-CM

## 2019-03-23 LAB
ALBUMIN SERPL BCP-MCNC: 2.7 G/DL (ref 3.5–5)
ALP SERPL-CCNC: 58 U/L (ref 46–116)
ALT SERPL W P-5'-P-CCNC: 13 U/L (ref 12–78)
ANION GAP SERPL CALCULATED.3IONS-SCNC: 3 MMOL/L (ref 4–13)
AST SERPL W P-5'-P-CCNC: 12 U/L (ref 5–45)
ATRIAL RATE: 70 BPM
ATRIAL RATE: 72 BPM
BASE EX.OXY STD BLDV CALC-SCNC: 68.8 % (ref 60–80)
BASE EXCESS BLDV CALC-SCNC: 8.6 MMOL/L
BASOPHILS # BLD AUTO: 0.04 THOUSANDS/ΜL (ref 0–0.1)
BASOPHILS NFR BLD AUTO: 0 % (ref 0–1)
BILIRUB SERPL-MCNC: 0.2 MG/DL (ref 0.2–1)
BUN SERPL-MCNC: 16 MG/DL (ref 5–25)
CALCIUM SERPL-MCNC: 9 MG/DL (ref 8.3–10.1)
CHLORIDE SERPL-SCNC: 103 MMOL/L (ref 100–108)
CO2 SERPL-SCNC: 36 MMOL/L (ref 21–32)
CREAT SERPL-MCNC: 0.82 MG/DL (ref 0.6–1.3)
EOSINOPHIL # BLD AUTO: 0.2 THOUSAND/ΜL (ref 0–0.61)
EOSINOPHIL NFR BLD AUTO: 2 % (ref 0–6)
ERYTHROCYTE [DISTWIDTH] IN BLOOD BY AUTOMATED COUNT: 12.6 % (ref 11.6–15.1)
GFR SERPL CREATININE-BSD FRML MDRD: 64 ML/MIN/1.73SQ M
GLUCOSE SERPL-MCNC: 150 MG/DL (ref 65–140)
HCO3 BLDV-SCNC: 35.6 MMOL/L (ref 24–30)
HCT VFR BLD AUTO: 37.2 % (ref 34.8–46.1)
HGB BLD-MCNC: 11 G/DL (ref 11.5–15.4)
IMM GRANULOCYTES # BLD AUTO: 0.04 THOUSAND/UL (ref 0–0.2)
IMM GRANULOCYTES NFR BLD AUTO: 0 % (ref 0–2)
LIPASE SERPL-CCNC: 81 U/L (ref 73–393)
LYMPHOCYTES # BLD AUTO: 1.49 THOUSANDS/ΜL (ref 0.6–4.47)
LYMPHOCYTES NFR BLD AUTO: 17 % (ref 14–44)
MCH RBC QN AUTO: 30 PG (ref 26.8–34.3)
MCHC RBC AUTO-ENTMCNC: 29.6 G/DL (ref 31.4–37.4)
MCV RBC AUTO: 101 FL (ref 82–98)
MONOCYTES # BLD AUTO: 0.52 THOUSAND/ΜL (ref 0.17–1.22)
MONOCYTES NFR BLD AUTO: 6 % (ref 4–12)
NEUTROPHILS # BLD AUTO: 6.73 THOUSANDS/ΜL (ref 1.85–7.62)
NEUTS SEG NFR BLD AUTO: 75 % (ref 43–75)
NRBC BLD AUTO-RTO: 0 /100 WBCS
NT-PROBNP SERPL-MCNC: 165 PG/ML
O2 CT BLDV-SCNC: 11.6 ML/DL
P AXIS: 76 DEGREES
P AXIS: 79 DEGREES
PCO2 BLDV: 61.3 MM HG (ref 42–50)
PH BLDV: 7.38 [PH] (ref 7.3–7.4)
PLATELET # BLD AUTO: 359 THOUSANDS/UL (ref 149–390)
PMV BLD AUTO: 8.5 FL (ref 8.9–12.7)
PO2 BLDV: 33.5 MM HG (ref 35–45)
POTASSIUM SERPL-SCNC: 3.8 MMOL/L (ref 3.5–5.3)
PR INTERVAL: 202 MS
PR INTERVAL: 212 MS
PROT SERPL-MCNC: 7.1 G/DL (ref 6.4–8.2)
QRS AXIS: 98 DEGREES
QRS AXIS: 98 DEGREES
QRSD INTERVAL: 70 MS
QRSD INTERVAL: 84 MS
QT INTERVAL: 392 MS
QT INTERVAL: 404 MS
QTC INTERVAL: 429 MS
QTC INTERVAL: 436 MS
RBC # BLD AUTO: 3.67 MILLION/UL (ref 3.81–5.12)
SODIUM SERPL-SCNC: 142 MMOL/L (ref 136–145)
T WAVE AXIS: 79 DEGREES
T WAVE AXIS: 83 DEGREES
TROPONIN I SERPL-MCNC: <0.02 NG/ML
VENTRICULAR RATE: 70 BPM
VENTRICULAR RATE: 72 BPM
WBC # BLD AUTO: 9.02 THOUSAND/UL (ref 4.31–10.16)

## 2019-03-23 PROCEDURE — 71046 X-RAY EXAM CHEST 2 VIEWS: CPT

## 2019-03-23 PROCEDURE — 94640 AIRWAY INHALATION TREATMENT: CPT

## 2019-03-23 PROCEDURE — 84484 ASSAY OF TROPONIN QUANT: CPT | Performed by: EMERGENCY MEDICINE

## 2019-03-23 PROCEDURE — 94760 N-INVAS EAR/PLS OXIMETRY 1: CPT

## 2019-03-23 PROCEDURE — 36415 COLL VENOUS BLD VENIPUNCTURE: CPT | Performed by: EMERGENCY MEDICINE

## 2019-03-23 PROCEDURE — 99285 EMERGENCY DEPT VISIT HI MDM: CPT

## 2019-03-23 PROCEDURE — 80053 COMPREHEN METABOLIC PANEL: CPT | Performed by: EMERGENCY MEDICINE

## 2019-03-23 PROCEDURE — 82805 BLOOD GASES W/O2 SATURATION: CPT | Performed by: EMERGENCY MEDICINE

## 2019-03-23 PROCEDURE — 83880 ASSAY OF NATRIURETIC PEPTIDE: CPT | Performed by: EMERGENCY MEDICINE

## 2019-03-23 PROCEDURE — 96374 THER/PROPH/DIAG INJ IV PUSH: CPT

## 2019-03-23 PROCEDURE — 99220 PR INITIAL OBSERVATION CARE/DAY 70 MINUTES: CPT | Performed by: INTERNAL MEDICINE

## 2019-03-23 PROCEDURE — 93005 ELECTROCARDIOGRAM TRACING: CPT

## 2019-03-23 PROCEDURE — 93010 ELECTROCARDIOGRAM REPORT: CPT | Performed by: INTERNAL MEDICINE

## 2019-03-23 PROCEDURE — 83690 ASSAY OF LIPASE: CPT | Performed by: EMERGENCY MEDICINE

## 2019-03-23 PROCEDURE — 85025 COMPLETE CBC W/AUTO DIFF WBC: CPT | Performed by: EMERGENCY MEDICINE

## 2019-03-23 RX ORDER — ASPIRIN 81 MG/1
81 TABLET ORAL DAILY
Status: DISCONTINUED | OUTPATIENT
Start: 2019-03-24 | End: 2019-03-24 | Stop reason: HOSPADM

## 2019-03-23 RX ORDER — FENTANYL 12 UG/H
1 PATCH TRANSDERMAL
Status: DISCONTINUED | OUTPATIENT
Start: 2019-03-23 | End: 2019-03-24 | Stop reason: HOSPADM

## 2019-03-23 RX ORDER — HEPARIN SODIUM 5000 [USP'U]/ML
5000 INJECTION, SOLUTION INTRAVENOUS; SUBCUTANEOUS EVERY 8 HOURS SCHEDULED
Status: DISCONTINUED | OUTPATIENT
Start: 2019-03-23 | End: 2019-03-24 | Stop reason: HOSPADM

## 2019-03-23 RX ORDER — BISACODYL 10 MG
10 SUPPOSITORY, RECTAL RECTAL DAILY
Status: DISCONTINUED | OUTPATIENT
Start: 2019-03-24 | End: 2019-03-24 | Stop reason: HOSPADM

## 2019-03-23 RX ORDER — CLOPIDOGREL BISULFATE 75 MG/1
75 TABLET ORAL DAILY
Status: DISCONTINUED | OUTPATIENT
Start: 2019-03-24 | End: 2019-03-24 | Stop reason: HOSPADM

## 2019-03-23 RX ORDER — ONDANSETRON 4 MG/1
4 TABLET, FILM COATED ORAL EVERY 8 HOURS PRN
Status: DISCONTINUED | OUTPATIENT
Start: 2019-03-23 | End: 2019-03-23

## 2019-03-23 RX ORDER — PANTOPRAZOLE SODIUM 40 MG/1
40 TABLET, DELAYED RELEASE ORAL DAILY
Status: DISCONTINUED | OUTPATIENT
Start: 2019-03-24 | End: 2019-03-24 | Stop reason: HOSPADM

## 2019-03-23 RX ORDER — DOCUSATE SODIUM 100 MG/1
100 CAPSULE, LIQUID FILLED ORAL 2 TIMES DAILY PRN
Status: DISCONTINUED | OUTPATIENT
Start: 2019-03-23 | End: 2019-03-24 | Stop reason: HOSPADM

## 2019-03-23 RX ORDER — ALBUTEROL SULFATE 2.5 MG/3ML
2.5 SOLUTION RESPIRATORY (INHALATION) EVERY 6 HOURS PRN
Status: DISCONTINUED | OUTPATIENT
Start: 2019-03-23 | End: 2019-03-24 | Stop reason: HOSPADM

## 2019-03-23 RX ORDER — ONDANSETRON 4 MG/1
4 TABLET, ORALLY DISINTEGRATING ORAL EVERY 8 HOURS PRN
Status: DISCONTINUED | OUTPATIENT
Start: 2019-03-23 | End: 2019-03-24 | Stop reason: HOSPADM

## 2019-03-23 RX ORDER — METHYLPREDNISOLONE SODIUM SUCCINATE 125 MG/2ML
125 INJECTION, POWDER, LYOPHILIZED, FOR SOLUTION INTRAMUSCULAR; INTRAVENOUS ONCE
Status: COMPLETED | OUTPATIENT
Start: 2019-03-23 | End: 2019-03-23

## 2019-03-23 RX ORDER — ACETAMINOPHEN 325 MG/1
650 TABLET ORAL EVERY 6 HOURS PRN
Status: DISCONTINUED | OUTPATIENT
Start: 2019-03-23 | End: 2019-03-24 | Stop reason: HOSPADM

## 2019-03-23 RX ORDER — LANOLIN ALCOHOL/MO/W.PET/CERES
3 CREAM (GRAM) TOPICAL
Status: DISCONTINUED | OUTPATIENT
Start: 2019-03-23 | End: 2019-03-24 | Stop reason: HOSPADM

## 2019-03-23 RX ORDER — METHYLPREDNISOLONE SODIUM SUCCINATE 40 MG/ML
40 INJECTION, POWDER, LYOPHILIZED, FOR SOLUTION INTRAMUSCULAR; INTRAVENOUS EVERY 12 HOURS SCHEDULED
Status: DISCONTINUED | OUTPATIENT
Start: 2019-03-23 | End: 2019-03-24 | Stop reason: HOSPADM

## 2019-03-23 RX ORDER — ALBUTEROL SULFATE 2.5 MG/3ML
5 SOLUTION RESPIRATORY (INHALATION) ONCE
Status: COMPLETED | OUTPATIENT
Start: 2019-03-23 | End: 2019-03-23

## 2019-03-23 RX ORDER — METHOCARBAMOL 500 MG/1
500 TABLET, FILM COATED ORAL EVERY 8 HOURS SCHEDULED
Status: DISCONTINUED | OUTPATIENT
Start: 2019-03-23 | End: 2019-03-24 | Stop reason: HOSPADM

## 2019-03-23 RX ADMIN — FENTANYL 1 PATCH: 12 PATCH, EXTENDED RELEASE TRANSDERMAL at 21:15

## 2019-03-23 RX ADMIN — METHYLPREDNISOLONE SODIUM SUCCINATE 125 MG: 125 INJECTION, POWDER, FOR SOLUTION INTRAMUSCULAR; INTRAVENOUS at 16:49

## 2019-03-23 RX ADMIN — ALBUTEROL SULFATE 5 MG: 2.5 SOLUTION RESPIRATORY (INHALATION) at 16:49

## 2019-03-23 RX ADMIN — METHYLPREDNISOLONE SODIUM SUCCINATE 40 MG: 40 INJECTION, POWDER, FOR SOLUTION INTRAMUSCULAR; INTRAVENOUS at 21:15

## 2019-03-23 RX ADMIN — MELATONIN 3 MG: 3 TAB ORAL at 21:16

## 2019-03-23 RX ADMIN — METHOCARBAMOL 500 MG: 500 TABLET ORAL at 21:19

## 2019-03-23 RX ADMIN — HEPARIN SODIUM 5000 UNITS: 5000 INJECTION INTRAVENOUS; SUBCUTANEOUS at 21:15

## 2019-03-23 RX ADMIN — IPRATROPIUM BROMIDE 0.5 MG: 0.5 SOLUTION RESPIRATORY (INHALATION) at 16:49

## 2019-03-23 RX ADMIN — METOPROLOL TARTRATE 25 MG: 25 TABLET, FILM COATED ORAL at 21:15

## 2019-03-24 VITALS
OXYGEN SATURATION: 97 % | TEMPERATURE: 97.4 F | WEIGHT: 125.22 LBS | RESPIRATION RATE: 18 BRPM | SYSTOLIC BLOOD PRESSURE: 161 MMHG | BODY MASS INDEX: 23.04 KG/M2 | HEART RATE: 72 BPM | DIASTOLIC BLOOD PRESSURE: 66 MMHG | HEIGHT: 62 IN

## 2019-03-24 LAB
ANION GAP SERPL CALCULATED.3IONS-SCNC: 4 MMOL/L (ref 4–13)
BUN SERPL-MCNC: 20 MG/DL (ref 5–25)
CALCIUM SERPL-MCNC: 9.8 MG/DL (ref 8.3–10.1)
CHLORIDE SERPL-SCNC: 102 MMOL/L (ref 100–108)
CO2 SERPL-SCNC: 36 MMOL/L (ref 21–32)
CREAT SERPL-MCNC: 0.69 MG/DL (ref 0.6–1.3)
ERYTHROCYTE [DISTWIDTH] IN BLOOD BY AUTOMATED COUNT: 12.5 % (ref 11.6–15.1)
GFR SERPL CREATININE-BSD FRML MDRD: 78 ML/MIN/1.73SQ M
GLUCOSE P FAST SERPL-MCNC: 173 MG/DL (ref 65–99)
GLUCOSE SERPL-MCNC: 173 MG/DL (ref 65–140)
HCT VFR BLD AUTO: 38 % (ref 34.8–46.1)
HGB BLD-MCNC: 11.6 G/DL (ref 11.5–15.4)
MCH RBC QN AUTO: 30.7 PG (ref 26.8–34.3)
MCHC RBC AUTO-ENTMCNC: 30.5 G/DL (ref 31.4–37.4)
MCV RBC AUTO: 101 FL (ref 82–98)
PLATELET # BLD AUTO: 372 THOUSANDS/UL (ref 149–390)
PMV BLD AUTO: 8.5 FL (ref 8.9–12.7)
POTASSIUM SERPL-SCNC: 4.8 MMOL/L (ref 3.5–5.3)
RBC # BLD AUTO: 3.78 MILLION/UL (ref 3.81–5.12)
SODIUM SERPL-SCNC: 142 MMOL/L (ref 136–145)
WBC # BLD AUTO: 7.06 THOUSAND/UL (ref 4.31–10.16)

## 2019-03-24 PROCEDURE — 80048 BASIC METABOLIC PNL TOTAL CA: CPT | Performed by: INTERNAL MEDICINE

## 2019-03-24 PROCEDURE — 85027 COMPLETE CBC AUTOMATED: CPT | Performed by: INTERNAL MEDICINE

## 2019-03-24 PROCEDURE — 99217 PR OBSERVATION CARE DISCHARGE MANAGEMENT: CPT | Performed by: FAMILY MEDICINE

## 2019-03-24 RX ORDER — ALBUTEROL SULFATE 2.5 MG/3ML
2.5 SOLUTION RESPIRATORY (INHALATION) EVERY 6 HOURS PRN
Refills: 0
Start: 2019-03-24

## 2019-03-24 RX ORDER — FENTANYL 12 UG/H
1 PATCH TRANSDERMAL
Qty: 2 PATCH | Refills: 0 | Status: SHIPPED | OUTPATIENT
Start: 2019-03-24 | End: 2019-04-03

## 2019-03-24 RX ORDER — PREDNISONE 10 MG/1
TABLET ORAL
Refills: 0
Start: 2019-03-24 | End: 2020-04-24 | Stop reason: HOSPADM

## 2019-03-24 RX ADMIN — METOPROLOL TARTRATE 25 MG: 25 TABLET, FILM COATED ORAL at 10:04

## 2019-03-24 RX ADMIN — HEPARIN SODIUM 5000 UNITS: 5000 INJECTION INTRAVENOUS; SUBCUTANEOUS at 05:24

## 2019-03-24 RX ADMIN — PANTOPRAZOLE SODIUM 40 MG: 40 TABLET, DELAYED RELEASE ORAL at 10:04

## 2019-03-24 RX ADMIN — METHYLPREDNISOLONE SODIUM SUCCINATE 40 MG: 40 INJECTION, POWDER, FOR SOLUTION INTRAMUSCULAR; INTRAVENOUS at 10:04

## 2019-03-24 RX ADMIN — ASPIRIN 81 MG: 81 TABLET, COATED ORAL at 10:15

## 2019-03-24 RX ADMIN — CLOPIDOGREL BISULFATE 75 MG: 75 TABLET ORAL at 10:04

## 2019-03-24 RX ADMIN — METHOCARBAMOL 500 MG: 500 TABLET ORAL at 05:24

## 2019-03-25 ENCOUNTER — TRANSITIONAL CARE MANAGEMENT (OUTPATIENT)
Dept: INTERNAL MEDICINE CLINIC | Facility: CLINIC | Age: 84
End: 2019-03-25

## 2019-04-14 NOTE — PROGRESS NOTES
Progress Note - Pulmonary   Vinnie Self 80 y o  female MRN: [id number]  Unit/Bed#: -01 Encounter: [id number]    Assessment:  1  COPD exacerbation  2  Acute tracheobronchitis    Plan:  1  COPD exacerbation due to acute tracheobronchitis  - CTA done shows no PE, no infiltrates  On prior CT in 2015 she had 2 tiny nodules in right lung not seen on this exam  - Continue azithromycin course for tracheobronchitis, flu negative, viral panel pending  - She is on her baseline 3L oxygen with sats in mid-high 90s  - Continue with ATC nebs, she is not on any long acting inhalers at home  - Will switch to PO prednisone for tomorrow  - Continue mucinex  - She has not seen us in the office on over 1 year, will need follow up upon discharge  - Anticipate discharge home tomorrow    Subjective:   Patient feels tired today and has been sneezing, otherwise feeling better  No nausea, no shortness of breath  Does have a mild dry cough  Objective:     Vitals: Blood pressure 157/70, pulse 76, temperature 97 9 °F (36 6 °C), temperature source Oral, resp  rate 16, height 5' 2" (1 575 m), weight 62 6 kg (138 lb 0 1 oz), SpO2 93 %  ,Body mass index is 25 24 kg/m²  Intake/Output Summary (Last 24 hours) at 10/17/17 1450  Last data filed at 10/17/17 1349   Gross per 24 hour   Intake              360 ml   Output                0 ml   Net              360 ml       Invasive Devices     Peripheral Intravenous Line            Peripheral IV 10/14/17 Right Antecubital 2 days                Physical Exam: General appearance: alert, appears stated age and cooperative  Lungs: Diminished air entry bilaterally, no wheezing  Heart: regular rate and rhythm and S1, S2 normal  Extremities: extremities normal, atraumatic, no cyanosis or edema  Neurologic: Mental status: Alert, oriented, thought content appropriate     Labs: I have personally reviewed pertinent lab results  , CBC: No results found for: WBC, HGB, HCT, MCV, PLT, ADJUSTEDWBC, MCH, MCHC, RDW, MPV, NRBC, CMP: No results found for: NA, K, CL, CO2, ANIONGAP, BUN, CREATININE, GLUCOSE, CALCIUM, AST, ALT, ALKPHOS, PROT, ALBUMIN, BILITOT, EGFR  Imaging and other studies: I have personally reviewed pertinent reports     and I have personally reviewed pertinent films in PACS 78

## 2019-04-16 ENCOUNTER — PATIENT OUTREACH (OUTPATIENT)
Dept: INTERNAL MEDICINE CLINIC | Facility: CLINIC | Age: 84
End: 2019-04-16

## 2019-04-16 DIAGNOSIS — Z71.89 COMPLEX CARE COORDINATION: Primary | ICD-10-CM

## 2019-04-24 ENCOUNTER — PATIENT OUTREACH (OUTPATIENT)
Dept: INTERNAL MEDICINE CLINIC | Facility: CLINIC | Age: 84
End: 2019-04-24

## 2020-01-02 ENCOUNTER — OFFICE VISIT (OUTPATIENT)
Dept: CARDIOLOGY CLINIC | Facility: CLINIC | Age: 85
End: 2020-01-02
Payer: MEDICARE

## 2020-01-02 VITALS
BODY MASS INDEX: 26.5 KG/M2 | OXYGEN SATURATION: 95 % | SYSTOLIC BLOOD PRESSURE: 124 MMHG | DIASTOLIC BLOOD PRESSURE: 60 MMHG | HEIGHT: 62 IN | HEART RATE: 64 BPM | WEIGHT: 144 LBS

## 2020-01-02 DIAGNOSIS — E78.2 MIXED HYPERLIPIDEMIA: ICD-10-CM

## 2020-01-02 DIAGNOSIS — I10 ESSENTIAL HYPERTENSION: ICD-10-CM

## 2020-01-02 DIAGNOSIS — I25.10 CORONARY ARTERY DISEASE INVOLVING NATIVE CORONARY ARTERY OF NATIVE HEART WITHOUT ANGINA PECTORIS: Primary | ICD-10-CM

## 2020-01-02 PROCEDURE — 99214 OFFICE O/P EST MOD 30 MIN: CPT | Performed by: INTERNAL MEDICINE

## 2020-01-02 RX ORDER — FENTANYL 12 UG/H
1 PATCH TRANSDERMAL
COMMUNITY
Start: 2019-12-30

## 2020-01-02 RX ORDER — GABAPENTIN 100 MG/1
CAPSULE ORAL
COMMUNITY
Start: 2019-12-19

## 2020-01-02 RX ORDER — FERROUS SULFATE 325(65) MG
TABLET ORAL
COMMUNITY
Start: 2019-12-01

## 2020-01-02 RX ORDER — TRAMADOL HYDROCHLORIDE 50 MG/1
TABLET ORAL
COMMUNITY
Start: 2019-12-17 | End: 2020-04-19

## 2020-01-02 RX ORDER — ALPRAZOLAM 0.25 MG/1
TABLET ORAL
COMMUNITY
Start: 2019-12-22 | End: 2020-04-15 | Stop reason: HOSPADM

## 2020-01-02 RX ORDER — METHOCARBAMOL 500 MG/1
TABLET, FILM COATED ORAL
COMMUNITY
Start: 2019-12-13 | End: 2020-04-19

## 2020-01-02 RX ORDER — NAPROXEN 500 MG/1
TABLET ORAL
COMMUNITY
Start: 2019-12-26 | End: 2020-04-19

## 2020-01-02 RX ORDER — NITROGLYCERIN 0.4 MG/1
TABLET SUBLINGUAL
COMMUNITY
Start: 2019-12-06

## 2020-01-02 NOTE — PROGRESS NOTES
[facility] [address] [place] [facility] [id number]  Cardiology Follow Up    Alonso Jimenez  5/12/1930  [id number]      1  Coronary artery disease involving native coronary artery of native heart without angina pectoris     2  Mixed hyperlipidemia     3  Essential hypertension         Chief Complaint   Patient presents with    Follow-up    Coronary Artery Disease       Interval History:  Patient presents for follow-up visit  Patient has multiple medical problems  Patient has history of CAD status post stents many years ago  Patient also has COPD  Patient has back issues  She states that she has been compliant with all her present medications  Her shortness of breath is stable  Patient Active Problem List   Diagnosis    HTN (hypertension)    CAD (coronary artery disease)    Hyperlipidemia    Generalized osteoarthritis of multiple sites    Gait disturbance    Centrilobular emphysema (HCC)    Bilateral hearing loss    [facility]    Legally blind    Chest pain     Past Medical History:   Diagnosis Date    Cardiac disease     COPD (chronic obstructive pulmonary disease) ([facility]     Hyperlipidemia     Hypertension     MI (myocardial infarction) ([facility]     PVD (peripheral vascular disease) ([facility] )     Stroke ([facility] )      Social History     Socioeconomic History    Marital status:       Spouse name: Not on file    Number of children: Not on file    Years of education: Not on file    Highest education level: Not on file   Occupational History    Not on file   Social Needs    Financial resource strain: Not on file    Food insecurity:     Worry: Not on file     Inability: Not on file    Transportation needs:     Medical: Not on file     Non-medical: Not on file   Tobacco Use    Smoking status: Former Smoker    Smokeless tobacco: Former User   Substance and Sexual Activity    Alcohol use: Never     Frequency: Never    Drug use: No    Sexual activity: Not on file   Lifestyle    Physical activity:     Days per week: Not on file     Minutes per session: Not on file    Stress: Not on file   Relationships    Social connections:     Talks on phone: Not on file     Gets together: Not on file     Attends Taoism service: Not on file     Active member of club or organization: Not on file     Attends meetings of clubs or organizations: Not on file     Relationship status: Not on file    Intimate partner violence:     Fear of current or ex partner: Not on file     Emotionally abused: Not on file     Physically abused: Not on file     Forced sexual activity: Not on file   Other Topics Concern    Not on file   Social History Narrative    Not on file      No family history on file    Past Surgical History:   Procedure Laterality Date    APPENDECTOMY      BREAST SURGERY      CHOLECYSTECTOMY      CORONARY ANGIOPLASTY      ESOPHAGUS FOREIGN BODY REMOVAL N/A 6/13/2017    Procedure: REMOVAL FOREIGN BODY ESOPHAGUS;  Surgeon: Meyer Peabody, MD;  Location: [facility];  Service: Gastroenterology       Current Outpatient Medications:     acetaminophen (TYLENOL) 325 mg tablet, Take 650 mg by mouth every 6 (six) hours as needed for mild pain, Disp: , Rfl:     albuterol (2 5 mg/3 mL) 0 083 % nebulizer solution, Take 1 vial (2 5 mg total) by nebulization every 6 (six) hours as needed for wheezing, Disp: , Rfl: 0    ALPRAZolam (XANAX) 0 25 mg tablet, , Disp: , Rfl:     aspirin (ECOTRIN LOW STRENGTH) 81 mg EC tablet, Take 81 mg by mouth daily, Disp: , Rfl:     bisacodyl (DULCOLAX) 10 mg suppository, Insert 10 mg into the rectum daily, Disp: , Rfl:     clopidogrel (PLAVIX) 75 mg tablet, Take 75 mg by mouth daily, Disp: , Rfl:     docusate sodium (COLACE) 100 mg capsule, Take 100 mg by mouth 2 (two) times a day, Disp: , Rfl:     fentaNYL (DURAGESIC) 12 mcg/hr TD 72 hr patch, , Disp: , Rfl:     FEROSUL 325 (65 Fe) MG tablet, , Disp: , Rfl:    gabapentin (NEURONTIN) 100 mg capsule, , Disp: , Rfl:     magnesium hydroxide (MILK OF MAGNESIA) 400 mg/5 mL oral suspension, Take 30 mL by mouth daily as needed for constipation, Disp: , Rfl:     melatonin 3 mg, Take 3 mg by mouth daily at bedtime, Disp: , Rfl:     menthol-cetylpyridinium (CEPACOL) 3 MG lozenge, Take 1 lozenge by mouth as needed for sore throat, Disp: , Rfl:     methocarbamol (ROBAXIN) 500 mg tablet, , Disp: , Rfl:     methocarbamol (ROBAXIN) 750 mg tablet, Take 500 mg by mouth every 6 (six) hours as needed for muscle spasms, Disp: , Rfl:     metoprolol tartrate (LOPRESSOR) 25 mg tablet, Take 25 mg by mouth every 12 (twelve) hours, Disp: , Rfl:     naproxen (NAPROSYN) 500 mg tablet, , Disp: , Rfl:     nitroglycerin (NITROSTAT) 0 4 mg SL tablet, , Disp: , Rfl:     ondansetron (ZOFRAN) 4 mg tablet, Take 4 mg by mouth every 8 (eight) hours as needed for nausea or vomiting, Disp: , Rfl:     pantoprazole (PROTONIX) 40 mg tablet, Take 40 mg by mouth daily, Disp: , Rfl:     predniSONE 10 mg tablet, Take 50 mg for 5 days  , Disp: , Rfl: 0    rosuvastatin (CRESTOR) 10 MG tablet, Take 10 mg by mouth daily, Disp: , Rfl:     traMADol (ULTRAM) 50 mg tablet, , Disp: , Rfl:   Allergies   Allergen Reactions    Amoxicillin Hives and Vomiting     unsure    Penicillins Hives and Vomiting     unsure       Labs:  No visits with results within 2 Month(s) from this visit     Latest known visit with results is:   Admission on 03/23/2019, Discharged on 03/24/2019   Component Date Value    WBC 03/23/2019 9 02     RBC 03/23/2019 3 67*    Hemoglobin 03/23/2019 11 0*    Hematocrit 03/23/2019 37 2     MCV 03/23/2019 101*    MCH 03/23/2019 30 0     MCHC 03/23/2019 29 6*    RDW 03/23/2019 12 6     MPV 03/23/2019 8 5*    Platelets 37/56/7814 359     nRBC 03/23/2019 0     Neutrophils Relative 03/23/2019 75     Immat GRANS % 03/23/2019 0     Lymphocytes Relative 03/23/2019 17     Monocytes Relative 03/23/2019 6     Eosinophils Relative 03/23/2019 2     Basophils Relative 03/23/2019 0     Neutrophils Absolute 03/23/2019 6 73     Immature Grans Absolute 03/23/2019 0 04     Lymphocytes Absolute 03/23/2019 1 49     Monocytes Absolute 03/23/2019 0 52     Eosinophils Absolute 03/23/2019 0 20     Basophils Absolute 03/23/2019 0 04     Sodium 03/23/2019 142     Potassium 03/23/2019 3 8     Chloride 03/23/2019 103     CO2 03/23/2019 36*    ANION GAP 03/23/2019 3*    BUN 03/23/2019 16     Creatinine 03/23/2019 0 82     Glucose 03/23/2019 150*    Calcium 03/23/2019 9 0     AST 03/23/2019 12     ALT 03/23/2019 13     Alkaline Phosphatase 03/23/2019 58     Total Protein 03/23/2019 7 1     Albumin 03/23/2019 2 7*    Total Bilirubin 03/23/2019 0 20     eGFR 03/23/2019 64     Lipase 03/23/2019 81     Troponin I 03/23/2019 <0 02     NT-proBNP 03/23/2019 165     pH, Jose 03/23/2019 7 382     pCO2, Jose 03/23/2019 61 3*    pO2, Jose 03/23/2019 33 5*    HCO3, Jose 03/23/2019 35 6*    Base Excess, Jose 03/23/2019 8 6     O2 Content, Jose 03/23/2019 11 6     O2 HGB, VENOUS 03/23/2019 68 8     Ventricular Rate 03/23/2019 72     Atrial Rate 03/23/2019 72     MO Interval 03/23/2019 212     QRSD Interval 03/23/2019 70     QT Interval 03/23/2019 392     QTC Interval 03/23/2019 429     P Axis 03/23/2019 76     QRS Axis 03/23/2019 98     T Wave Axis 03/23/2019 83     Ventricular Rate 03/23/2019 70     Atrial Rate 03/23/2019 70     MO Interval 03/23/2019 202     QRSD Interval 03/23/2019 84     QT Interval 03/23/2019 404     QTC Interval 03/23/2019 436     P Axis 03/23/2019 79     QRS Axis 03/23/2019 98     T Wave Axis 03/23/2019 79     Sodium 03/24/2019 142     Potassium 03/24/2019 4 8     Chloride 03/24/2019 102     CO2 03/24/2019 36*    ANION GAP 03/24/2019 4     BUN 03/24/2019 20     Creatinine 03/24/2019 0 69     Glucose 03/24/2019 173*    Glucose, Fasting 03/24/2019 173*    Calcium 03/24/2019 9 8     eGFR 03/24/2019 78     WBC 03/24/2019 7 06     RBC 03/24/2019 3 78*    Hemoglobin 03/24/2019 11 6     Hematocrit 03/24/2019 38 0     MCV 03/24/2019 101*    MCH 03/24/2019 30 7     MCHC 03/24/2019 30 5*    RDW 03/24/2019 12 5     Platelets 18/49/0717 372     MPV 03/24/2019 8 5*     Imaging: No results found  Review of Systems:  Review of Systems   REVIEW OF SYSTEMS:  Constitutional:  Denies fever or chills   Eyes:  Denies change in visual acuity   HENT:  Denies nasal congestion or sore throat   Respiratory:   shortness of breath   Cardiovascular:  Denies chest pain or edema   GI:  Denies abdominal pain, nausea, vomiting, bloody stools or diarrhea   :  Denies dysuria, frequency, difficulty in micturition and nocturia  Musculoskeletal:  DJD  Neurologic:  Denies headache, focal weakness or sensory changes   Endocrine:  Denies polyuria or polydipsia   Lymphatic:  Denies swollen glands   Psychiatric:  Denies depression or anxiety     Physical Exam:    /60   Pulse 64   Ht 5' 2" (1 575 m)   Wt 65 3 kg (144 lb)   LMP  (LMP Unknown)   SpO2 95%   BMI 26 34 kg/m²     Physical Exam   PHYSICAL EXAM:  General:  Patient is not in acute distress, on oxygen   Head: Normocephalic, Atraumatic  HEENT:  Both pupils normal-size atraumatic, normocephalic, nonicteric  Neck:  JVP not raised  Trachea central  No carotid bruit  Respiratory:  Decreased breath sounds bilaterally  Cardiovascular:  Regular rate and rhythm no S3 no murmurs  GI:  Abdomen soft nontender  No organomegaly  Lymphatic:  No cervical or inguinal lymphadenopathy  Neurologic:  Patient is awake alert, oriented   Grossly nonfocal  Extremities no edema    Discussion/Summary:  Patient with multiple medical problems who seems to be doing reasonably well from cardiac standpoint  Previous studies reviewed with patient  Medications reviewed and possible side effects discussed   concepts of cardiovascular disease , signs and symptoms of heart disease  Dietary and risk factor modification reinforced  All questions answered  Safety measures reviewed  Patient advised to report any problems prompting medical attention  Patient had coronary intervention 7 to 8 years ago  Risks of dual anti-platelet therapy outweigh the benefits at this time  Will discontinue Plavix  Continue baby aspirin  Continue rest of the medications  Overall conservative management based on advanced age and multiple comorbidities  Patient is agreeable with the plan of care  Follow-up in 6 months  Follow-up with primary care physician

## 2020-02-24 NOTE — TELEPHONE ENCOUNTER
Patient of Dr Cate De Dios wife called stating that the patient needs to have a stent put in his heart in about 1 -2 weeks  Patient's wife is concern about the dye and they infection that the patient had because of his stent being blocked  Please call patient wife Chinedu Gutierrez @ [phone] to advise her about the dye and they replacement of the heart stent  Patient was seen today by Dr Moira Perales  And Dr Moira Perales is going to be speaking to Dr Taiwo Jones (Cardioloist luis) about when the procedure is going to be done    Cleophas Handler,   appt 3 to 4 weeks

## 2020-04-12 ENCOUNTER — APPOINTMENT (EMERGENCY)
Dept: RADIOLOGY | Facility: HOSPITAL | Age: 85
DRG: 177 | End: 2020-04-12
Payer: MEDICARE

## 2020-04-12 ENCOUNTER — HOSPITAL ENCOUNTER (INPATIENT)
Facility: HOSPITAL | Age: 85
LOS: 3 days | Discharge: NURSING, REHAB OR LONG-TERM CARE | DRG: 177 | End: 2020-04-15
Attending: EMERGENCY MEDICINE | Admitting: INTERNAL MEDICINE
Payer: MEDICARE

## 2020-04-12 DIAGNOSIS — R06.00 DYSPNEA: ICD-10-CM

## 2020-04-12 DIAGNOSIS — J06.9 URI (UPPER RESPIRATORY INFECTION): ICD-10-CM

## 2020-04-12 DIAGNOSIS — R09.02 HYPOXIA: Primary | ICD-10-CM

## 2020-04-12 DIAGNOSIS — I50.9 CHF (CONGESTIVE HEART FAILURE) (HCC): ICD-10-CM

## 2020-04-12 PROBLEM — K59.00 CONSTIPATION: Status: ACTIVE | Noted: 2020-04-12

## 2020-04-12 PROBLEM — J96.01 ACUTE RESPIRATORY FAILURE WITH HYPOXIA (HCC): Status: ACTIVE | Noted: 2020-04-12

## 2020-04-12 PROBLEM — R06.02 SHORTNESS OF BREATH: Status: ACTIVE | Noted: 2020-04-12

## 2020-04-12 LAB
ALBUMIN SERPL BCP-MCNC: 2.7 G/DL (ref 3.5–5)
ALP SERPL-CCNC: 60 U/L (ref 46–116)
ALT SERPL W P-5'-P-CCNC: 11 U/L (ref 12–78)
ANION GAP SERPL CALCULATED.3IONS-SCNC: 2 MMOL/L (ref 4–13)
AST SERPL W P-5'-P-CCNC: 20 U/L (ref 5–45)
ATRIAL RATE: 99 BPM
BASOPHILS # BLD AUTO: 0.03 THOUSANDS/ΜL (ref 0–0.1)
BASOPHILS NFR BLD AUTO: 0 % (ref 0–1)
BILIRUB SERPL-MCNC: 0.3 MG/DL (ref 0.2–1)
BUN SERPL-MCNC: 18 MG/DL (ref 5–25)
CALCIUM SERPL-MCNC: 8.7 MG/DL (ref 8.3–10.1)
CHLORIDE SERPL-SCNC: 100 MMOL/L (ref 100–108)
CK SERPL-CCNC: 22 U/L (ref 26–192)
CO2 SERPL-SCNC: 39 MMOL/L (ref 21–32)
CREAT SERPL-MCNC: 1.06 MG/DL (ref 0.6–1.3)
CRP SERPL QL: 46.8 MG/L
D DIMER PPP FEU-MCNC: 2.1 UG/ML FEU
EOSINOPHIL # BLD AUTO: 0.09 THOUSAND/ΜL (ref 0–0.61)
EOSINOPHIL NFR BLD AUTO: 1 % (ref 0–6)
ERYTHROCYTE [DISTWIDTH] IN BLOOD BY AUTOMATED COUNT: 12.9 % (ref 11.6–15.1)
GFR SERPL CREATININE-BSD FRML MDRD: 47 ML/MIN/1.73SQ M
GLUCOSE SERPL-MCNC: 127 MG/DL (ref 65–140)
HCT VFR BLD AUTO: 36.8 % (ref 34.8–46.1)
HGB BLD-MCNC: 10.9 G/DL (ref 11.5–15.4)
IMM GRANULOCYTES # BLD AUTO: 0.13 THOUSAND/UL (ref 0–0.2)
IMM GRANULOCYTES NFR BLD AUTO: 2 % (ref 0–2)
LACTATE SERPL-SCNC: 1.4 MMOL/L (ref 0.5–2)
LDH SERPL-CCNC: 250 U/L (ref 81–234)
LYMPHOCYTES # BLD AUTO: 1.18 THOUSANDS/ΜL (ref 0.6–4.47)
LYMPHOCYTES NFR BLD AUTO: 14 % (ref 14–44)
MCH RBC QN AUTO: 31.1 PG (ref 26.8–34.3)
MCHC RBC AUTO-ENTMCNC: 29.6 G/DL (ref 31.4–37.4)
MCV RBC AUTO: 105 FL (ref 82–98)
MONOCYTES # BLD AUTO: 0.53 THOUSAND/ΜL (ref 0.17–1.22)
MONOCYTES NFR BLD AUTO: 6 % (ref 4–12)
NEUTROPHILS # BLD AUTO: 6.65 THOUSANDS/ΜL (ref 1.85–7.62)
NEUTS SEG NFR BLD AUTO: 77 % (ref 43–75)
NRBC BLD AUTO-RTO: 0 /100 WBCS
NT-PROBNP SERPL-MCNC: 865 PG/ML
P AXIS: 78 DEGREES
PLATELET # BLD AUTO: 338 THOUSANDS/UL (ref 149–390)
PMV BLD AUTO: 8.6 FL (ref 8.9–12.7)
POTASSIUM SERPL-SCNC: 4.2 MMOL/L (ref 3.5–5.3)
PR INTERVAL: 196 MS
PROT SERPL-MCNC: 7.5 G/DL (ref 6.4–8.2)
QRS AXIS: 121 DEGREES
QRSD INTERVAL: 74 MS
QT INTERVAL: 350 MS
QTC INTERVAL: 449 MS
RBC # BLD AUTO: 3.51 MILLION/UL (ref 3.81–5.12)
SODIUM SERPL-SCNC: 141 MMOL/L (ref 136–145)
T WAVE AXIS: 77 DEGREES
TROPONIN I SERPL-MCNC: 0.02 NG/ML
VENTRICULAR RATE: 99 BPM
WBC # BLD AUTO: 8.61 THOUSAND/UL (ref 4.31–10.16)

## 2020-04-12 PROCEDURE — 83605 ASSAY OF LACTIC ACID: CPT | Performed by: EMERGENCY MEDICINE

## 2020-04-12 PROCEDURE — 84484 ASSAY OF TROPONIN QUANT: CPT | Performed by: EMERGENCY MEDICINE

## 2020-04-12 PROCEDURE — 99285 EMERGENCY DEPT VISIT HI MDM: CPT

## 2020-04-12 PROCEDURE — 85025 COMPLETE CBC W/AUTO DIFF WBC: CPT | Performed by: EMERGENCY MEDICINE

## 2020-04-12 PROCEDURE — 99285 EMERGENCY DEPT VISIT HI MDM: CPT | Performed by: EMERGENCY MEDICINE

## 2020-04-12 PROCEDURE — 85379 FIBRIN DEGRADATION QUANT: CPT | Performed by: PHYSICIAN ASSISTANT

## 2020-04-12 PROCEDURE — 83615 LACTATE (LD) (LDH) ENZYME: CPT | Performed by: PHYSICIAN ASSISTANT

## 2020-04-12 PROCEDURE — 86140 C-REACTIVE PROTEIN: CPT | Performed by: PHYSICIAN ASSISTANT

## 2020-04-12 PROCEDURE — 93005 ELECTROCARDIOGRAM TRACING: CPT

## 2020-04-12 PROCEDURE — 71045 X-RAY EXAM CHEST 1 VIEW: CPT

## 2020-04-12 PROCEDURE — 36415 COLL VENOUS BLD VENIPUNCTURE: CPT | Performed by: EMERGENCY MEDICINE

## 2020-04-12 PROCEDURE — 96374 THER/PROPH/DIAG INJ IV PUSH: CPT

## 2020-04-12 PROCEDURE — 99223 1ST HOSP IP/OBS HIGH 75: CPT | Performed by: PHYSICIAN ASSISTANT

## 2020-04-12 PROCEDURE — 83520 IMMUNOASSAY QUANT NOS NONAB: CPT | Performed by: PHYSICIAN ASSISTANT

## 2020-04-12 PROCEDURE — 83880 ASSAY OF NATRIURETIC PEPTIDE: CPT | Performed by: EMERGENCY MEDICINE

## 2020-04-12 PROCEDURE — 87635 SARS-COV-2 COVID-19 AMP PRB: CPT | Performed by: EMERGENCY MEDICINE

## 2020-04-12 PROCEDURE — 85384 FIBRINOGEN ACTIVITY: CPT | Performed by: PHYSICIAN ASSISTANT

## 2020-04-12 PROCEDURE — 82306 VITAMIN D 25 HYDROXY: CPT | Performed by: PHYSICIAN ASSISTANT

## 2020-04-12 PROCEDURE — 82728 ASSAY OF FERRITIN: CPT | Performed by: PHYSICIAN ASSISTANT

## 2020-04-12 PROCEDURE — 82550 ASSAY OF CK (CPK): CPT | Performed by: PHYSICIAN ASSISTANT

## 2020-04-12 PROCEDURE — 93010 ELECTROCARDIOGRAM REPORT: CPT | Performed by: INTERNAL MEDICINE

## 2020-04-12 PROCEDURE — 87040 BLOOD CULTURE FOR BACTERIA: CPT | Performed by: EMERGENCY MEDICINE

## 2020-04-12 PROCEDURE — 80053 COMPREHEN METABOLIC PANEL: CPT | Performed by: EMERGENCY MEDICINE

## 2020-04-12 RX ORDER — METHOCARBAMOL 500 MG/1
500 TABLET, FILM COATED ORAL EVERY 6 HOURS PRN
Status: DISCONTINUED | OUTPATIENT
Start: 2020-04-12 | End: 2020-04-13

## 2020-04-12 RX ORDER — DOCUSATE SODIUM 100 MG/1
100 CAPSULE, LIQUID FILLED ORAL 2 TIMES DAILY
Status: DISCONTINUED | OUTPATIENT
Start: 2020-04-12 | End: 2020-04-13

## 2020-04-12 RX ORDER — LIDOCAINE 50 MG/G
1 PATCH TOPICAL DAILY
Status: DISCONTINUED | OUTPATIENT
Start: 2020-04-13 | End: 2020-04-15 | Stop reason: HOSPADM

## 2020-04-12 RX ORDER — ASPIRIN 81 MG/1
81 TABLET ORAL DAILY
Status: DISCONTINUED | OUTPATIENT
Start: 2020-04-13 | End: 2020-04-13

## 2020-04-12 RX ORDER — FERROUS SULFATE 325(65) MG
325 TABLET ORAL 2 TIMES DAILY WITH MEALS
Status: DISCONTINUED | OUTPATIENT
Start: 2020-04-13 | End: 2020-04-13

## 2020-04-12 RX ORDER — ALBUTEROL SULFATE 90 UG/1
2 AEROSOL, METERED RESPIRATORY (INHALATION) ONCE
Status: DISCONTINUED | OUTPATIENT
Start: 2020-04-12 | End: 2020-04-12

## 2020-04-12 RX ORDER — CALCIUM CARBONATE 200(500)MG
1000 TABLET,CHEWABLE ORAL DAILY PRN
Status: DISCONTINUED | OUTPATIENT
Start: 2020-04-12 | End: 2020-04-13

## 2020-04-12 RX ORDER — ONDANSETRON 2 MG/ML
4 INJECTION INTRAMUSCULAR; INTRAVENOUS EVERY 6 HOURS PRN
Status: DISCONTINUED | OUTPATIENT
Start: 2020-04-12 | End: 2020-04-15

## 2020-04-12 RX ORDER — FUROSEMIDE 10 MG/ML
40 INJECTION INTRAMUSCULAR; INTRAVENOUS ONCE
Status: COMPLETED | OUTPATIENT
Start: 2020-04-12 | End: 2020-04-12

## 2020-04-12 RX ORDER — ATORVASTATIN CALCIUM 40 MG/1
40 TABLET, FILM COATED ORAL
Status: DISCONTINUED | OUTPATIENT
Start: 2020-04-13 | End: 2020-04-13

## 2020-04-12 RX ORDER — ASCORBIC ACID 500 MG
1000 TABLET ORAL 2 TIMES DAILY
Status: DISCONTINUED | OUTPATIENT
Start: 2020-04-12 | End: 2020-04-13

## 2020-04-12 RX ORDER — FUROSEMIDE 10 MG/ML
20 INJECTION INTRAMUSCULAR; INTRAVENOUS DAILY
Status: DISCONTINUED | OUTPATIENT
Start: 2020-04-13 | End: 2020-04-13

## 2020-04-12 RX ORDER — ZINC SULFATE 50(220)MG
220 CAPSULE ORAL DAILY
Status: DISCONTINUED | OUTPATIENT
Start: 2020-04-13 | End: 2020-04-13

## 2020-04-12 RX ORDER — ALPRAZOLAM 0.25 MG/1
0.25 TABLET ORAL DAILY
Status: DISCONTINUED | OUTPATIENT
Start: 2020-04-13 | End: 2020-04-13

## 2020-04-12 RX ORDER — HEPARIN SODIUM 5000 [USP'U]/ML
5000 INJECTION, SOLUTION INTRAVENOUS; SUBCUTANEOUS EVERY 8 HOURS SCHEDULED
Status: DISCONTINUED | OUTPATIENT
Start: 2020-04-12 | End: 2020-04-13

## 2020-04-12 RX ORDER — LANOLIN ALCOHOL/MO/W.PET/CERES
3 CREAM (GRAM) TOPICAL
Status: DISCONTINUED | OUTPATIENT
Start: 2020-04-12 | End: 2020-04-13

## 2020-04-12 RX ORDER — TRAMADOL HYDROCHLORIDE 50 MG/1
50 TABLET ORAL EVERY 6 HOURS PRN
Status: DISCONTINUED | OUTPATIENT
Start: 2020-04-12 | End: 2020-04-13

## 2020-04-12 RX ORDER — POLYETHYLENE GLYCOL 3350 17 G/17G
17 POWDER, FOR SOLUTION ORAL DAILY
Status: DISCONTINUED | OUTPATIENT
Start: 2020-04-13 | End: 2020-04-13

## 2020-04-12 RX ORDER — FENTANYL 25 UG/H
25 PATCH TRANSDERMAL
Status: DISCONTINUED | OUTPATIENT
Start: 2020-04-13 | End: 2020-04-15 | Stop reason: HOSPADM

## 2020-04-12 RX ORDER — ACETAMINOPHEN 325 MG/1
650 TABLET ORAL EVERY 6 HOURS PRN
Status: DISCONTINUED | OUTPATIENT
Start: 2020-04-12 | End: 2020-04-13

## 2020-04-12 RX ORDER — PANTOPRAZOLE SODIUM 40 MG/1
40 TABLET, DELAYED RELEASE ORAL DAILY
Status: DISCONTINUED | OUTPATIENT
Start: 2020-04-13 | End: 2020-04-13

## 2020-04-12 RX ORDER — ALBUTEROL SULFATE 90 UG/1
8 AEROSOL, METERED RESPIRATORY (INHALATION) ONCE
Status: COMPLETED | OUTPATIENT
Start: 2020-04-12 | End: 2020-04-12

## 2020-04-12 RX ORDER — CLOPIDOGREL BISULFATE 75 MG/1
75 TABLET ORAL DAILY
Status: DISCONTINUED | OUTPATIENT
Start: 2020-04-13 | End: 2020-04-13

## 2020-04-12 RX ORDER — GABAPENTIN 100 MG/1
100 CAPSULE ORAL 3 TIMES DAILY
Status: DISCONTINUED | OUTPATIENT
Start: 2020-04-12 | End: 2020-04-13

## 2020-04-12 RX ADMIN — OXYCODONE HYDROCHLORIDE AND ACETAMINOPHEN 1000 MG: 500 TABLET ORAL at 21:18

## 2020-04-12 RX ADMIN — GABAPENTIN 100 MG: 100 CAPSULE ORAL at 21:13

## 2020-04-12 RX ADMIN — MELATONIN 3 MG: at 21:13

## 2020-04-12 RX ADMIN — DOCUSATE SODIUM 100 MG: 100 CAPSULE, LIQUID FILLED ORAL at 21:18

## 2020-04-12 RX ADMIN — CEFTRIAXONE SODIUM 1000 MG: 10 INJECTION, POWDER, FOR SOLUTION INTRAVENOUS at 19:56

## 2020-04-12 RX ADMIN — FUROSEMIDE 40 MG: 10 INJECTION, SOLUTION INTRAMUSCULAR; INTRAVENOUS at 17:27

## 2020-04-12 RX ADMIN — HEPARIN SODIUM 5000 UNITS: 5000 INJECTION INTRAVENOUS; SUBCUTANEOUS at 21:13

## 2020-04-12 RX ADMIN — AZITHROMYCIN 500 MG: 500 INJECTION, POWDER, LYOPHILIZED, FOR SOLUTION INTRAVENOUS at 20:00

## 2020-04-12 RX ADMIN — ALBUTEROL SULFATE 8 PUFF: 90 AEROSOL, METERED RESPIRATORY (INHALATION) at 17:33

## 2020-04-12 NOTE — ED PROVIDER NOTES
History  Chief Complaint   Patient presents with    Cough     Per EMS pt co cough and SOB  PT from [facility] with exposure to + Covid 19   Shortness of Breath     80 y o  F presents from [facility] with shortness of breath and increased oxygen requirement  PMHx is concerning for cardiac diseae, COPD, HLD, HTN, MI, PVD, and prior stroke  Presenting from [facility]  - she has had cough and SOB since yesterday, presents afebrile  EMS noted a wet sounding non-productive cough  PMHx of COPD and requires 2L NC at home baseline  PVM noted increased O2 requirement today  Prior to Admission Medications   Prescriptions Last Dose Informant Patient Reported? Taking?    ALPRAZolam (XANAX) 0 25 mg tablet   Yes No   FEROSUL 325 (65 Fe) MG tablet   Yes No   acetaminophen (TYLENOL) 325 mg tablet   Yes No   Sig: Take 650 mg by mouth every 6 (six) hours as needed for mild pain   albuterol (2 5 mg/3 mL) 0 083 % nebulizer solution   No No   Sig: Take 1 vial (2 5 mg total) by nebulization every 6 (six) hours as needed for wheezing   aspirin (ECOTRIN LOW STRENGTH) 81 mg EC tablet   Yes No   Sig: Take 81 mg by mouth daily   bisacodyl (DULCOLAX) 10 mg suppository   Yes No   Sig: Insert 10 mg into the rectum daily   clopidogrel (PLAVIX) 75 mg tablet   Yes No   Sig: Take 75 mg by mouth daily   docusate sodium (COLACE) 100 mg capsule   Yes No   Sig: Take 100 mg by mouth 2 (two) times a day   fentaNYL (DURAGESIC) 12 mcg/hr TD 72 hr patch   Yes No   gabapentin (NEURONTIN) 100 mg capsule   Yes No   magnesium hydroxide (MILK OF MAGNESIA) 400 mg/5 mL oral suspension   Yes No   Sig: Take 30 mL by mouth daily as needed for constipation   melatonin 3 mg   Yes No   Sig: Take 3 mg by mouth daily at bedtime   menthol-cetylpyridinium (CEPACOL) 3 MG lozenge   Yes No   Sig: Take 1 lozenge by mouth as needed for sore throat   methocarbamol (ROBAXIN) 500 mg tablet   Yes No   methocarbamol (ROBAXIN) 750 mg tablet   Yes No Sig: Take 500 mg by mouth every 6 (six) hours as needed for muscle spasms   metoprolol tartrate (LOPRESSOR) 25 mg tablet   Yes No   Sig: Take 25 mg by mouth every 12 (twelve) hours   naproxen (NAPROSYN) 500 mg tablet   Yes No   nitroglycerin (NITROSTAT) 0 4 mg SL tablet   Yes No   ondansetron (ZOFRAN) 4 mg tablet   Yes No   Sig: Take 4 mg by mouth every 8 (eight) hours as needed for nausea or vomiting   pantoprazole (PROTONIX) 40 mg tablet   Yes No   Sig: Take 40 mg by mouth daily   predniSONE 10 mg tablet   No No   Sig: Take 50 mg for 5 days  rosuvastatin (CRESTOR) 10 MG tablet   Yes No   Sig: Take 10 mg by mouth daily   traMADol (ULTRAM) 50 mg tablet   Yes No      Facility-Administered Medications: None       Past Medical History:   Diagnosis Date    Cardiac disease     [facility]     Hyperlipidemia     Hypertension     MI (myocardial infarction) [place] [address] )     PVD (peripheral vascular disease) ([place] [address] )     Stroke ([place] [address] )        Past Surgical History:   Procedure Laterality Date    APPENDECTOMY      BREAST SURGERY      CHOLECYSTECTOMY      CORONARY ANGIOPLASTY      ESOPHAGUS FOREIGN BODY REMOVAL N/A 6/13/2017    Procedure: REMOVAL FOREIGN BODY ESOPHAGUS;  Surgeon: Geovanni Krishnan MD;  Location: [facility];  Service: Gastroenterology       History reviewed  No pertinent family history  I have reviewed and agree with the history as documented  E-Cigarette/Vaping     E-Cigarette/Vaping Substances     Social History     Tobacco Use    Smoking status: Former Smoker    Smokeless tobacco: Former User   Substance Use Topics    Alcohol use: Never     Frequency: Never    Drug use: No       Review of Systems   Unable to perform ROS: Severe respiratory distress       Physical Exam  Physical Exam   Constitutional: She is oriented to person, place, and time  She appears well-developed and well-nourished  Non-toxic appearance  She appears ill  She appears distressed     HENT: Head: Normocephalic and atraumatic  Mouth/Throat: Oropharynx is clear and moist    Eyes: Conjunctivae and EOM are normal    Neck: Normal range of motion  Cardiovascular: Normal rate  Pulmonary/Chest: Accessory muscle usage present  Tachypnea noted  She is in respiratory distress  She has decreased breath sounds  She has no wheezes  She has rhonchi  She has rales  She exhibits bony tenderness  Abdominal: Soft  There is no tenderness  Musculoskeletal: Normal range of motion  Right lower leg: Normal  She exhibits no tenderness and no edema  Left lower leg: Normal  She exhibits no tenderness and no edema  Neurological: She is alert and oriented to person, place, and time  No cranial nerve deficit  Skin: Skin is warm and dry  She is not diaphoretic  No pallor  Psychiatric: She has a normal mood and affect  Her behavior is normal    Vitals reviewed  Vital Signs  ED Triage Vitals [04/12/20 1656]   Temp Pulse Respirations Blood Pressure SpO2   -- 101 (!) 26 127/67 91 %      Temp Source Heart Rate Source Patient Position - Orthostatic VS BP Location FiO2 (%)   Temporal Monitor Sitting Right arm --      Pain Score       --           Vitals:    04/12/20 1656   BP: 127/67   Pulse: 101   Patient Position - Orthostatic VS: Sitting         Visual Acuity      ED Medications  Medications   ceftriaxone (ROCEPHIN) 1 g/50 mL in dextrose IVPB (has no administration in time range)   azithromycin (ZITHROMAX) 500 mg in sodium chloride 0 9% 250mL IVPB 500 mg (has no administration in time range)   furosemide (LASIX) injection 40 mg (40 mg Intravenous Given 4/12/20 1727)   albuterol (PROVENTIL HFA,VENTOLIN HFA) inhaler 8 puff (8 puffs Inhalation Given 4/12/20 1733)       Diagnostic Studies  Results Reviewed     Procedure Component Value Units Date/Time    [facility] Coron[facility] [[id number]] Collected:  04/12/20 1812    Lab Status:   In process Specimen:  Nasopharyngeal Swab Updated: 04/12/20 1818    NT-BNP PRO [[id number]]  (Abnormal) Collected:  04/12/20 1718    Lab Status:  Final result Specimen:  Blood from Arm, Right Updated:  04/12/20 1751     NT-proBNP 865 pg/mL     Lactic acid, plasma [[id number]]  (Normal) Collected:  04/12/20 1718    Lab Status:  Final result Specimen:  Blood from Arm, Right Updated:  04/12/20 1746     LACTIC ACID 1 4 mmol/L     Narrative:       Result may be elevated if tourniquet was used during collection  Troponin I [[id number]]  (Normal) Collected:  04/12/20 1718    Lab Status:  Final result Specimen:  Blood from Arm, Right Updated:  04/12/20 1746     Troponin I 0 02 ng/mL     Comprehensive metabolic panel [[id number]]  (Abnormal) Collected:  04/12/20 1718    Lab Status:  Final result Specimen:  Blood from Arm, Right Updated:  04/12/20 1744     Sodium 141 mmol/L      Potassium 4 2 mmol/L      Chloride 100 mmol/L      CO2 39 mmol/L      ANION GAP 2 mmol/L      BUN 18 mg/dL      Creatinine 1 06 mg/dL      Glucose 127 mg/dL      Calcium 8 7 mg/dL      AST 20 U/L      ALT 11 U/L      Alkaline Phosphatase 60 U/L      Total Protein 7 5 g/dL      Albumin 2 7 g/dL      Total Bilirubin 0 30 mg/dL      eGFR 47 ml/min/1 73sq m     Narrative:       Jenelle guidelines for Chronic Kidney Disease (CKD):     Stage 1 with normal or high GFR (GFR > 90 mL/min/1 73 square meters)    Stage 2 Mild CKD (GFR = 60-89 mL/min/1 73 square meters)    Stage 3A Moderate CKD (GFR = 45-59 mL/min/1 73 square meters)    Stage 3B Moderate CKD (GFR = 30-44 mL/min/1 73 square meters)    Stage 4 Severe CKD (GFR = 15-29 mL/min/1 73 square meters)    Stage 5 End Stage CKD (GFR <15 mL/min/1 73 square meters)  Note: GFR calculation is accurate only with a steady state creatinine    Blood culture [[id number]] Collected:  04/12/20 1732    Lab Status:   In process Specimen:  Blood from Arm, Left Updated:  04/12/20 1737    CBC and differential [[id number]]  (Abnormal) Collected: 04/12/20 1718    Lab Status:  Final result Specimen:  Blood from Arm, Right Updated:  04/12/20 1725     WBC 8 61 Thousand/uL      RBC 3 51 Million/uL      Hemoglobin 10 9 g/dL      Hematocrit 36 8 %       fL      MCH 31 1 pg      MCHC 29 6 g/dL      RDW 12 9 %      MPV 8 6 fL      Platelets 646 Thousands/uL      nRBC 0 /100 WBCs      Neutrophils Relative 77 %      Immat GRANS % 2 %      Lymphocytes Relative 14 %      Monocytes Relative 6 %      Eosinophils Relative 1 %      Basophils Relative 0 %      Neutrophils Absolute 6 65 Thousands/µL      Immature Grans Absolute 0 13 Thousand/uL      Lymphocytes Absolute 1 18 Thousands/µL      Monocytes Absolute 0 53 Thousand/µL      Eosinophils Absolute 0 09 Thousand/µL      Basophils Absolute 0 03 Thousands/µL     Blood culture [[id number]] Collected:  04/12/20 1718    Lab Status: In process Specimen:  Blood from Arm, Right Updated:  04/12/20 1723                 XR chest 1 view portable   ED Interpretation by Isidro Napoles DO (04/12 9117)   Concern for infiltrates vs dependent edema  Final Result by Adonis Gaspar MD (04/12 1857)      There are small bilateral pleural effusions, and diffuse interstitial prominence  Findings are more typical of interstitial pulmonary edema than COVID-19 pneumonia              Workstation performed: [id number]                    Procedures  ECG 12 Lead Documentation Only  Date/Time: 4/12/2020 5:27 PM  Performed by: Isidro Napoles DO  Authorized by: Isidro Napoles DO     Indications / Diagnosis:  Sob  ECG reviewed by me, the ED Provider: yes    Patient location:  ED  Previous ECG:     Previous ECG:  Compared to current    Comparison ECG info:  March 2019    Similarity:  No change    Comparison to cardiac monitor: Yes    Interpretation:     Interpretation: non-specific    Rate:     ECG rate:  99    ECG rate assessment: normal    Rhythm:     Rhythm: sinus rhythm    Ectopy:     Ectopy: PVCs    QRS:     QRS axis:  Normal    QRS intervals:  Normal  Conduction:     Conduction: normal    ST segments:     ST segments:  Non-specific  T waves:     T waves: normal               ED Course  ED Course as of Apr 12 1904   Sun Apr 12, 2020   1757 NT-proBNP(!): 865   1816 Patient comfortable on 4-6 NC  Still discussing w family members options for intubation  Unsure if DNI  She is DNR      [phone] Concern for pneumonia vs CHF - will give antibiotics and admit  Already received Lasix  Identification of Seniors at Risk      Most Recent Value   (ISAR) Identification of Seniors at Risk   Before the illness or injury that brought you to the Emergency, did you need someone to help you on a regular basis? 0 Filed at: 04/12/2020 1700   In the last 24 hours, have you needed more help than usual?  1 Filed at: 04/12/2020 1700   Have you been hospitalized for one or more nights during the past 6 months? 0 Filed at: 04/12/2020 1700   In general, do you see well?  0 Filed at: 04/12/2020 1700   In general, do you have serious problems with your memory? 1 Filed at: 04/12/2020 1700   Do you take more than three different medications every day? 1 Filed at: 04/12/2020 1700   ISAR Score  3 Filed at: 04/12/2020 1700                                  MDM  Number of Diagnoses or Management Options  Diagnosis management comments: resp distress  Concern for COVID given the current pandemic  Concern for CHF, COPD exacerbation, pneumonia of other cause, MI  Spoke w daughter and indicated the severity of the situation if the patient is COVID +  Daughter understands and is going to talk to patient on the phone to help discuss her wishes  Patient is DNR but has difficulty deciding about intubation          Amount and/or Complexity of Data Reviewed  Clinical lab tests: ordered and reviewed  Tests in the radiology section of CPT®: ordered and reviewed          Disposition  Final diagnoses:   Hypoxia   URI (upper respiratory infection) CH[facility]congestive heart failure) ([facility] )   Dyspnea     Time reflects when diagnosis was documented in both MDM as applicable and the Disposition within this note     Time User Action Codes Description Comment    4/12/2020  7:03 PM Francisco Walker Add [R09 02] Hypoxia     4/12/2020  7:03 PM Coppersmith, Gladys Collet L Add [J06 9] URI (upper respiratory infection)     4/12/2020  7:03 PM Coppersmith, Gladys Collet L Add [I50 9] CHF (congestive heart failure) ([facility] )     4/12/2020  7:03 PM Dung Norwood Add [R06 00] Dyspnea       ED Disposition     ED Disposition Condition Date/Time Comment    Admit Stable Sun Apr 12, 2020  7:03 PM Case was discussed with Roman TAYLOR and the patient's admission status was agreed to be Admission Status: inpatient status to the service of [facility]   Follow-up Information    None         Patient's Medications   Discharge Prescriptions    No medications on file     No discharge procedures on file      PDMP Review     None          ED Provider  Electronically Signed by           Miguelina Patel DO  04/12/20 1126

## 2020-04-13 PROBLEM — U07.1 COVID-19 VIRUS INFECTION: Status: ACTIVE | Noted: 2020-04-13

## 2020-04-13 LAB
25(OH)D3 SERPL-MCNC: 10.8 NG/ML (ref 30–100)
ANION GAP SERPL CALCULATED.3IONS-SCNC: 6 MMOL/L (ref 4–13)
BASOPHILS # BLD MANUAL: 0.07 THOUSAND/UL (ref 0–0.1)
BASOPHILS NFR MAR MANUAL: 1 % (ref 0–1)
BUN SERPL-MCNC: 17 MG/DL (ref 5–25)
CALCIUM SERPL-MCNC: 8.6 MG/DL (ref 8.3–10.1)
CHLORIDE SERPL-SCNC: 100 MMOL/L (ref 100–108)
CO2 SERPL-SCNC: 35 MMOL/L (ref 21–32)
CREAT SERPL-MCNC: 1.13 MG/DL (ref 0.6–1.3)
EOSINOPHIL # BLD MANUAL: 0.22 THOUSAND/UL (ref 0–0.4)
EOSINOPHIL NFR BLD MANUAL: 3 % (ref 0–6)
ERYTHROCYTE [DISTWIDTH] IN BLOOD BY AUTOMATED COUNT: 12.8 % (ref 11.6–15.1)
FERRITIN SERPL-MCNC: 320 NG/ML (ref 8–388)
FIBRINOGEN PPP-MCNC: 740 MG/DL (ref 227–495)
GFR SERPL CREATININE-BSD FRML MDRD: 43 ML/MIN/1.73SQ M
GLUCOSE SERPL-MCNC: 108 MG/DL (ref 65–140)
GLUCOSE SERPL-MCNC: 114 MG/DL (ref 65–140)
HCT VFR BLD AUTO: 35.4 % (ref 34.8–46.1)
HGB BLD-MCNC: 10.3 G/DL (ref 11.5–15.4)
LYMPHOCYTES # BLD AUTO: 1.3 THOUSAND/UL (ref 0.6–4.47)
LYMPHOCYTES # BLD AUTO: 18 % (ref 14–44)
MAGNESIUM SERPL-MCNC: 2.4 MG/DL (ref 1.6–2.6)
MCH RBC QN AUTO: 31.4 PG (ref 26.8–34.3)
MCHC RBC AUTO-ENTMCNC: 29.1 G/DL (ref 31.4–37.4)
MCV RBC AUTO: 108 FL (ref 82–98)
METAMYELOCYTES NFR BLD MANUAL: 2 % (ref 0–1)
MONOCYTES # BLD AUTO: 0.8 THOUSAND/UL (ref 0–1.22)
MONOCYTES NFR BLD: 11 % (ref 4–12)
MYELOCYTES NFR BLD MANUAL: 1 % (ref 0–1)
NEUTROPHILS # BLD MANUAL: 4.63 THOUSAND/UL (ref 1.85–7.62)
NEUTS BAND NFR BLD MANUAL: 4 % (ref 0–8)
NEUTS SEG NFR BLD AUTO: 60 % (ref 43–75)
NRBC BLD AUTO-RTO: 0 /100 WBCS
PLATELET # BLD AUTO: 307 THOUSANDS/UL (ref 149–390)
PLATELET # BLD AUTO: 368 THOUSANDS/UL (ref 149–390)
PLATELET BLD QL SMEAR: ADEQUATE
PMV BLD AUTO: 8.5 FL (ref 8.9–12.7)
PMV BLD AUTO: 8.8 FL (ref 8.9–12.7)
POTASSIUM SERPL-SCNC: 4 MMOL/L (ref 3.5–5.3)
PROCALCITONIN SERPL-MCNC: 0.05 NG/ML
RBC # BLD AUTO: 3.28 MILLION/UL (ref 3.81–5.12)
SARS-COV-2 RNA RESP QL NAA+PROBE: POSITIVE
SODIUM SERPL-SCNC: 141 MMOL/L (ref 136–145)
TOTAL CELLS COUNTED SPEC: 100
TROPONIN I SERPL-MCNC: 0.02 NG/ML
TROPONIN I SERPL-MCNC: <0.02 NG/ML
WBC # BLD AUTO: 7.24 THOUSAND/UL (ref 4.31–10.16)

## 2020-04-13 PROCEDURE — 82948 REAGENT STRIP/BLOOD GLUCOSE: CPT

## 2020-04-13 PROCEDURE — 99222 1ST HOSP IP/OBS MODERATE 55: CPT | Performed by: INTERNAL MEDICINE

## 2020-04-13 PROCEDURE — 85007 BL SMEAR W/DIFF WBC COUNT: CPT | Performed by: PHYSICIAN ASSISTANT

## 2020-04-13 PROCEDURE — 93005 ELECTROCARDIOGRAM TRACING: CPT

## 2020-04-13 PROCEDURE — 84145 PROCALCITONIN (PCT): CPT | Performed by: PHYSICIAN ASSISTANT

## 2020-04-13 PROCEDURE — 85027 COMPLETE CBC AUTOMATED: CPT | Performed by: PHYSICIAN ASSISTANT

## 2020-04-13 PROCEDURE — 84484 ASSAY OF TROPONIN QUANT: CPT | Performed by: PHYSICIAN ASSISTANT

## 2020-04-13 PROCEDURE — 80048 BASIC METABOLIC PNL TOTAL CA: CPT | Performed by: PHYSICIAN ASSISTANT

## 2020-04-13 PROCEDURE — 94760 N-INVAS EAR/PLS OXIMETRY 1: CPT

## 2020-04-13 PROCEDURE — 99233 SBSQ HOSP IP/OBS HIGH 50: CPT | Performed by: INTERNAL MEDICINE

## 2020-04-13 PROCEDURE — 85049 AUTOMATED PLATELET COUNT: CPT | Performed by: PHYSICIAN ASSISTANT

## 2020-04-13 PROCEDURE — 83735 ASSAY OF MAGNESIUM: CPT | Performed by: PHYSICIAN ASSISTANT

## 2020-04-13 PROCEDURE — 94664 DEMO&/EVAL PT USE INHALER: CPT

## 2020-04-13 RX ORDER — METHYLPREDNISOLONE SODIUM SUCCINATE 40 MG/ML
40 INJECTION, POWDER, LYOPHILIZED, FOR SOLUTION INTRAMUSCULAR; INTRAVENOUS DAILY
Status: DISCONTINUED | OUTPATIENT
Start: 2020-04-13 | End: 2020-04-13

## 2020-04-13 RX ORDER — ALBUTEROL SULFATE 90 UG/1
8 AEROSOL, METERED RESPIRATORY (INHALATION) EVERY 4 HOURS PRN
Status: DISCONTINUED | OUTPATIENT
Start: 2020-04-13 | End: 2020-04-13

## 2020-04-13 RX ORDER — ALBUTEROL SULFATE 90 UG/1
2 AEROSOL, METERED RESPIRATORY (INHALATION) EVERY 4 HOURS PRN
Status: DISCONTINUED | OUTPATIENT
Start: 2020-04-13 | End: 2020-04-15 | Stop reason: HOSPADM

## 2020-04-13 RX ORDER — HYDROXYCHLOROQUINE SULFATE 200 MG/1
200 TABLET, FILM COATED ORAL 2 TIMES DAILY
Status: DISCONTINUED | OUTPATIENT
Start: 2020-04-13 | End: 2020-04-13

## 2020-04-13 RX ORDER — FUROSEMIDE 10 MG/ML
20 INJECTION INTRAMUSCULAR; INTRAVENOUS ONCE
Status: COMPLETED | OUTPATIENT
Start: 2020-04-13 | End: 2020-04-13

## 2020-04-13 RX ORDER — HYDROXYCHLOROQUINE SULFATE 200 MG/1
400 TABLET, FILM COATED ORAL 2 TIMES DAILY
Status: COMPLETED | OUTPATIENT
Start: 2020-04-13 | End: 2020-04-13

## 2020-04-13 RX ORDER — LORAZEPAM 2 MG/ML
0.5 INJECTION INTRAMUSCULAR EVERY 4 HOURS PRN
Status: DISCONTINUED | OUTPATIENT
Start: 2020-04-13 | End: 2020-04-15

## 2020-04-13 RX ORDER — ALBUMIN, HUMAN INJ 5% 5 %
12.5 SOLUTION INTRAVENOUS ONCE
Status: COMPLETED | OUTPATIENT
Start: 2020-04-13 | End: 2020-04-13

## 2020-04-13 RX ORDER — AZITHROMYCIN 250 MG/1
250 TABLET, FILM COATED ORAL EVERY 24 HOURS
Status: DISCONTINUED | OUTPATIENT
Start: 2020-04-13 | End: 2020-04-13

## 2020-04-13 RX ADMIN — ALBUMIN (HUMAN) 12.5 G: 12.5 INJECTION, SOLUTION INTRAVENOUS at 05:28

## 2020-04-13 RX ADMIN — ZINC SULFATE CAP 220 MG (50 MG ELEMENTAL ZN) 220 MG: 220 (50 ZN) CAP at 09:57

## 2020-04-13 RX ADMIN — FUROSEMIDE 20 MG: 10 INJECTION, SOLUTION INTRAMUSCULAR; INTRAVENOUS at 06:03

## 2020-04-13 RX ADMIN — HYDROXYCHLOROQUINE SULFATE 400 MG: 200 TABLET, FILM COATED ORAL at 09:51

## 2020-04-13 RX ADMIN — LORAZEPAM 0.5 MG: 2 INJECTION INTRAMUSCULAR; INTRAVENOUS at 10:48

## 2020-04-13 RX ADMIN — POLYETHYLENE GLYCOL 3350 17 G: 17 POWDER, FOR SOLUTION ORAL at 09:49

## 2020-04-13 RX ADMIN — GABAPENTIN 100 MG: 100 CAPSULE ORAL at 10:04

## 2020-04-13 RX ADMIN — PANTOPRAZOLE SODIUM 40 MG: 40 TABLET, DELAYED RELEASE ORAL at 09:49

## 2020-04-13 RX ADMIN — FERROUS SULFATE TAB 325 MG (65 MG ELEMENTAL FE) 325 MG: 325 (65 FE) TAB at 09:49

## 2020-04-13 RX ADMIN — METHYLPREDNISOLONE SODIUM SUCCINATE 40 MG: 40 INJECTION, POWDER, FOR SOLUTION INTRAMUSCULAR; INTRAVENOUS at 09:50

## 2020-04-13 RX ADMIN — ONDANSETRON 4 MG: 2 INJECTION INTRAMUSCULAR; INTRAVENOUS at 05:05

## 2020-04-13 RX ADMIN — OXYCODONE HYDROCHLORIDE AND ACETAMINOPHEN 1000 MG: 500 TABLET ORAL at 09:48

## 2020-04-13 RX ADMIN — ASPIRIN 81 MG: 81 TABLET, COATED ORAL at 09:49

## 2020-04-13 RX ADMIN — LIDOCAINE 1 PATCH: 50 PATCH TOPICAL at 09:58

## 2020-04-13 RX ADMIN — FENTANYL 25 MCG: 25 PATCH TRANSDERMAL at 00:58

## 2020-04-13 RX ADMIN — ONDANSETRON 4 MG: 2 INJECTION INTRAMUSCULAR; INTRAVENOUS at 20:15

## 2020-04-13 RX ADMIN — HYDROXYCHLOROQUINE SULFATE 400 MG: 200 TABLET, FILM COATED ORAL at 00:58

## 2020-04-13 RX ADMIN — DOCUSATE SODIUM 100 MG: 100 CAPSULE, LIQUID FILLED ORAL at 09:49

## 2020-04-13 RX ADMIN — METOPROLOL TARTRATE 25 MG: 25 TABLET, FILM COATED ORAL at 09:49

## 2020-04-13 RX ADMIN — HEPARIN SODIUM 5000 UNITS: 5000 INJECTION INTRAVENOUS; SUBCUTANEOUS at 05:04

## 2020-04-13 RX ADMIN — ALPRAZOLAM 0.25 MG: 0.25 TABLET ORAL at 09:48

## 2020-04-13 RX ADMIN — MORPHINE SULFATE 2 MG: 2 INJECTION, SOLUTION INTRAMUSCULAR; INTRAVENOUS at 20:18

## 2020-04-13 RX ADMIN — MORPHINE SULFATE 2 MG: 2 INJECTION, SOLUTION INTRAMUSCULAR; INTRAVENOUS at 10:49

## 2020-04-13 NOTE — ASSESSMENT & PLAN NOTE
She was started on treatment for add-what ever available, however, given patient's significant decline and distress and plan for comfort care, all those medications have been

## 2020-04-13 NOTE — ASSESSMENT & PLAN NOTE
· O2 dependent 2L NC  · Now with viral COVID infection  · Continue home dose Albuterol inhaler  · Currently requiring O2 4L NC to maintain O2 sat as above  · Wean O2 as able

## 2020-04-13 NOTE — PROGRESS NOTES
[facility] Internal Medicine Progress Note  Patient: Felicity Angel 80 y o  female   MRN: [id number]  PCP: No primary care provider on file  Unit/Bed#: -01 Encounter: [id number]  Date Of Visit: 04/13/20          * Acute respiratory failure with hypoxia [facility])  Assessment & Plan  This is likely multifactorial including COVID 19 infection/heart failure/COPD exacerbation and not sure if she has thromboembolism  She is almost 80years old  I spoke with patient's daughter over the phone couple of times  Plan is no aggressive treatments  Patient basically was awake and alert when I saw her initially and she was bagging for something to be given for her to relax with significant respiratory distress  Patient is on comfort care  Patient was also evaluated by Cardiology service  COVID-19 virus infection  Assessment & Plan  She was started on treatment for add-what ever available, however, given patient's significant decline and distress and plan for comfort care, all those medications have been  Generalized osteoarthritis of multiple sites  Assessment & Plan  Pain control and comfort care    CAD (coronary artery disease)  Assessment & Plan  Plan for comfort care and stop all the medications    HTN (hypertension)  Assessment & Plan  Plan as above      Present on Admission:   Acute respiratory failure with hypoxia (HCC)   CAD (coronary artery disease)   HTN (hypertension)   Generalized osteoarthritis of multiple sites   COVID-19 virus infection            VTE Pharmacologic Prophylaxis:   Pharmacologic: She was started on DVT prophylaxis which has been stopped now as plan is for comfort care  Mechanical VTE Prophylaxis in Place: Yes    Patient Centered Rounds: I have performed bedside rounds with nursing staff today  Discussions with Specialists or Other Care Team Provider:  Yes    Education and Discussions with Family / Patient:  Yes including patient's daughter    Time Spent for Care: 45+ minutes  More than 50% of total time spent on counseling and coordination of care as described above  Current Length of Stay: 1 day(s)    Current Patient Status: Inpatient   Certification Statement: The patient will continue to require additional inpatient hospital stay due to Respiratory failure    Discharge Plan:  Patient on comfort care    Code Status: Level 4 - Comfort Care      Subjective:   Patient admitted overnight  Seen through the window  Patient initially did not even  the phone as she was in quite a bit of respiratory distress  While talking to me over the phone, she asked for to be made comfortable and given medications for her distress/pain  She complained of having very hard time breathing  Objective:     Vitals:   Temp (24hrs), Av 9 °F (36 6 °C), Min:97 4 °F (36 3 °C), Max:98 7 °F (37 1 °C)    Temp:  [97 4 °F (36 3 °C)-98 7 °F (37 1 °C)] 97 8 °F (36 6 °C)  HR:  [0-105] 87  Resp:  [18-26] 24  BP: (100-140)/(45-85) 104/85  SpO2:  [91 %-99 %] 97 %  Body mass index is 26 65 kg/m²  Input and Output Summary (last 24 hours):        Intake/Output Summary (Last 24 hours) at 2020 1314  Last data filed at 2020 1100  Gross per 24 hour   Intake 50 ml   Output 200 ml   Net -150 ml           Physical Exam:     Vital signs reviewed as above  Patient in quite a bit of respiratory distress when I saw her initially  Later on after receiving morphine, she was resting comfortably  Appear tachypneic  Looks pale  Distended neck veins visible  Anxious  No cyanosis or clubbing      Additional Data:     Labs:    Results from last 7 days   Lab Units 20  1018 20  0620 20  1718   WBC Thousand/uL  --  7 24 8 61   HEMOGLOBIN g/dL  --  10 3* 10 9*   HEMATOCRIT %  --  35 4 36 8   PLATELETS Thousands/uL 368 307 338   NEUTROS PCT %  --   --  77*   LYMPHS PCT %  --   --  14   LYMPHO PCT %  --  18  --    MONOS PCT %  --   --  6   MONO PCT %  --  11  --    EOS PCT %  --  3 1     Results from last 7 days   Lab Units 04/13/20  0620 04/12/20  1718   POTASSIUM mmol/L 4 0 4 2   CHLORIDE mmol/L 100 100   CO2 mmol/L 35* 39*   BUN mg/dL 17 18   CREATININE mg/dL 1 13 1 06   CALCIUM mg/dL 8 6 8 7   ALK PHOS U/L  --  60   ALT U/L  --  11*   AST U/L  --  20           * I Have Reviewed All Lab Data Listed Above  * Additional Pertinent Lab Tests Reviewed: All Labs Within Last 24 Hours Reviewed      Recent Cultures (last 7 days):     Results from last 7 days   Lab Units 04/12/20  1732 04/12/20  1718   BLOOD CULTURE  Received in Microbiology Lab  Culture in Progress  Received in Microbiology Lab  Culture in Progress  Last 24 Hours Medication List:     Current Facility-Administered Medications:  albuterol 2 puff Inhalation Q4H PRN Adalid Farley MD   ALPRAZolam 0 25 mg Oral Daily Merline Bran, PA-C   fentaNYL 25 mcg Transdermal Q72H Merline Bran, PA-C   lidocaine 1 patch Topical Daily Merline Bran, PA-C   LORazepam 0 5 mg Intravenous Q4H PRN Jenni Joe MD   morphine injection 2 mg Intravenous Q3H PRN Jenni Joe MD   ondansetron 4 mg Intravenous Q6H PRN Merline Dagoberto, PA-C        Today, Patient Was Seen By: Jenni Joe MD    ** Please Note: Dragon 360 Dictation voice to text software may have been used in the creation of this document   **

## 2020-04-13 NOTE — ASSESSMENT & PLAN NOTE
This is likely multifactorial including COVID 19 infection/heart failure/COPD exacerbation and not sure if she has thromboembolism  She is almost 80years old  I spoke with patient's daughter over the phone couple of times  Plan is no aggressive treatments  Patient basically was awake and alert when I saw her initially and she was bagging for something to be given for her to relax with significant respiratory distress  Patient is on comfort care  Patient was also evaluated by Cardiology service

## 2020-04-13 NOTE — ASSESSMENT & PLAN NOTE
· BP adequately controlled on current regimen  · Continue home dose Lopressor  · Monitor BP per unit protocol

## 2020-04-13 NOTE — ASSESSMENT & PLAN NOTE
· Reports ongoing constipation   Last BM 3 days ago  · Continue home dose Colace and PRN MOM  · Miralax daily

## 2020-04-13 NOTE — CONSULTS
Consultation - Cardiology   Dania Almonte 80 y o  female MRN: [id number]  Unit/Bed#: -01 Encounter: [id number]  04/13/20  8:13 AM    Assessment/ Plan:  1  Acute respiratory failure with hypoxia secondary to COPD exacerbation with COVID-19 virus infection  CXR showed small bilateral pleural effusions with diffuse interstitial prominence indicative of interstitial pulmonary edema rather than go COVID-19 infection  NTproBNP is 865  D-dimer 1846  Would recommend CT chest to rule out PE, however, family would like only comfort measures at this time given age and co-morbidities  DNR/DNI was updated  2  Coronary artery disease s/p PCI, approximately 8 years ago  She denies chest pain or anginal equivalent  Troponins were negative x2  Did have recent ischemic testing with pharmacologic MPI on 03/15/2019 which showed normal study  Previous echocardiogram on 03/15/2019 showed EF is 65% without regional wall motion abnormalities mild LVH, grade 1 diastolic dysfunction, mild AR, mild TR, PASP 45mmHg, moderate PI  Continue Lopressor, atorvastatin and ASA  Discontinue Plavix to increased bleed risk vs benefit  3  Hypertension, stable with episodes of hypotension on IV albumin  Continue with Lopressor 25 mg BID and Lasix IV 20 mg daily  4  Hyperlipidemia, continue atorvastatin 40mg daily  Signing off  Please call with any questions  History of Present Illness   Physician Requesting Consult: Oumar Almanzar MD  Reason for Consult / Principal Problem:   HPI: Dania Almonte is a 80y o  year old female with history of severe COPD on chronic 2L O2 via nasal cannula, coronary artery disease s/p PCI, hypertension, hyperlipidemia and osteoarthritis who presents with increasing shortness of breath with persistent dry cough  She was found to be COVID-19 positive    He was found with abnormal chest x-ray with small bilateral pleural effusions and interstitial prominence suspicious for congestive heart failure and less cardiology was consulted for further evaluation of this  She comes from [facility] where there are several known COVID positive cases  Overnight patient had shortness of breath and increasing oxygen demand  She was given albuterol via inhaler with improvement  Her O2 was increased to 6 L via nasal cannula and was satting at 90%  Her blood pressure was notably low 90/70 and was started on IV albumin  She was initially given IV Lasix 40 mg in the ED followed by additional Lasix 20 mg with modest diuresis  Weights have been stable, reported dry weight is 144  Inpatient consult to Cardiology  Consult performed by: Kayla Romero PA-C  Consult ordered by: Ilana Saba PA-C        EKG: Sinus rhythm with premature supraventricular complexes, right axis deviation, poor R-wave progression  Review of Systems   Constitutional: Negative for appetite change, chills, diaphoresis, fatigue and fever  Respiratory: Positive for cough, chest tightness and shortness of breath  Cardiovascular: Negative for chest pain, palpitations and leg swelling  Gastrointestinal: Negative for diarrhea, nausea and vomiting  Endocrine: Negative for cold intolerance and heat intolerance  Genitourinary: Negative for difficulty urinating, dysuria and enuresis  Musculoskeletal: Negative for arthralgias, back pain and gait problem  Allergic/Immunologic: Negative for environmental allergies and food allergies  Neurological: Negative for dizziness, facial asymmetry and headaches  Hematological: Negative for adenopathy  Does not bruise/bleed easily  Psychiatric/Behavioral: Negative for agitation, behavioral problems and confusion         Historical Information   Past Medical History:   Diagnosis Date    Cardiac disease     COPD [facility]chronic obstructive pulmonary disease) ([facility] )     Hyperlipidemia     Hypertension     MI (myocardial infarction) ([facility] )     PVD (peripheral vascular disease) ([facility] )     Stroke ([place] [address] ) Past Surgical History:   Procedure Laterality Date    APPENDECTOMY      BREAST SURGERY      CHOLECYSTECTOMY      CORONARY ANGIOPLASTY      ESOPHAGUS FOREIGN BODY REMOVAL N/A 6/13/2017    Procedure: REMOVAL FOREIGN BODY ESOPHAGUS;  Surgeon: Braxton Valadez MD;  Location: [facility];  Service: Gastroenterology     Social History     Substance and Sexual Activity   Alcohol Use Not Currently    Frequency: Never     Social History     Substance and Sexual Activity   Drug Use No     Social History     Tobacco Use   Smoking Status Former Smoker    Types: Cigarettes   Smokeless Tobacco Former User   Tobacco Comment    Quit 2 5 years ago       Family History:   Family History   Family history unknown: Yes       Meds/Allergies   current meds:   Current Facility-Administered Medications   Medication Dose Route Frequency    acetaminophen (TYLENOL) tablet 650 mg  650 mg Oral Q6H PRN    albuterol (PROVENTIL HFA,VENTOLIN HFA) inhaler 2 puff  2 puff Inhalation Q4H PRN    ALPRAZolam (XANAX) tablet 0 25 mg  0 25 mg Oral Daily    ascorbic acid (VITAMIN C) tablet 1,000 mg  1,000 mg Oral BID    aspirin (ECOTRIN LOW STRENGTH) EC tablet 81 mg  81 mg Oral Daily    atorvastatin (LIPITOR) tablet 40 mg  40 mg Oral Daily With Dinner    azithromycin (ZITHROMAX) tablet 250 mg  250 mg Oral Q24H    calcium carbonate (TUMS) chewable tablet 1,000 mg  1,000 mg Oral Daily PRN    cefTRIAXone (ROCEPHIN) 1,000 mg in dextrose 5 % 50 mL IVPB  1,000 mg Intravenous Q24H    docusate sodium (COLACE) capsule 100 mg  100 mg Oral BID    fentaNYL (DURAGESIC) 25 mcg/hr TD 72 hr patch 25 mcg  25 mcg Transdermal Q72H    ferrous sulfate tablet 325 mg  325 mg Oral BID With Meals    furosemide (LASIX) injection 20 mg  20 mg Intravenous Daily    gabapentin (NEURONTIN) capsule 100 mg  100 mg Oral TID    heparin (porcine) subcutaneous injection 5,000 Units  5,000 Units Subcutaneous Q8H [facility]    hydroxychloroquine (PLAQUENIL) tablet 200 mg  200 mg Oral BID    hydroxychloroquine (PLAQUENIL) tablet 400 mg  400 mg Oral BID    lidocaine (LIDODERM) 5 % patch 1 patch  1 patch Topical Daily    magnesium hydroxide (MILK OF MAGNESIA) 400 mg/5 mL oral suspension 30 mL  30 mL Oral Daily PRN    melatonin tablet 3 mg  3 mg Oral HS    methocarbamol (ROBAXIN) tablet 500 mg  500 mg Oral Q6H PRN    methylPREDNISolone sodium succinate (Solu-MEDROL) injection 40 mg  40 mg Intravenous Daily    metoprolol tartrate (LOPRESSOR) tablet 25 mg  25 mg Oral Q12H [address]    ondansetron (ZOFRAN) injection 4 mg  4 mg Intravenous Q6H PRN    pantoprazole (PROTONIX) EC tablet 40 mg  40 mg Oral Daily    polyethylene glycol (MIRALAX) packet 17 g  17 g Oral Daily    traMADol (ULTRAM) tablet 50 mg  50 mg Oral Q6H PRN    zinc sulfate (ZINCATE) capsule 220 mg  220 mg Oral Daily     Allergies   Allergen Reactions    Amoxicillin Hives and Vomiting     unsure    Mevacor [Lovastatin] Myalgia    Penicillins Hives and Vomiting     unsure       Objective   Vitals: Blood pressure 140/54, pulse 79, temperature 97 8 °F (36 6 °C), resp  rate (!) 24, height 5' 2" (1 575 m), weight 66 1 kg (145 lb 11 6 oz), SpO2 99 %  , Body mass index is 26 65 kg/m² ,   Orthostatic Blood Pressures      Most Recent Value   Blood Pressure  140/54 filed at 04/13/2020 0601   Patient Position - Orthostatic VS  Lying filed at 04/12/2020 0788          Systolic (70SVE), URS:550 , Min:100 , WPA:924     Diastolic (31VWB), EOK:06, Min:45, Max:74        Intake/Output Summary (Last 24 hours) at 4/13/2020 0813  Last data filed at 4/12/2020 2029  Gross per 24 hour   Intake 50 ml   Output    Net 50 ml       Invasive Devices     Peripheral Intravenous Line            Peripheral IV 04/12/20 Left Wrist 1 day    Peripheral IV 04/12/20 Right Antecubital 1 day                Physical Exam:  GEN: +frail/weak  Alert and oriented x 3, in no acute distress  Well appearing and well nourished     HEENT: Sclera anicteric, conjunctivae pink, mucous membranes moist  Oropharynx clear  NECK: Supple, no carotid bruits, no significant JVD  Trachea midline, no thyromegaly  HEART: +tachycardiac, normal S1 and S2, no murmurs, clicks, gallops or rubs  PMI nondisplaced, no thrills  LUNGS:  +tachypneic, +course breath sounds, +mild scattered wheezes throughout  ABDOMEN: Soft, nontender, nondistended, normoactive bowel sounds  EXTREMITIES: Skin warm and well perfused, no clubbing, cyanosis, or edema  NEURO: No focal findings  Normal speech   Mood and affect normal    SKIN: Normal without suspicious lesions on exposed skin  Lab Results:     Troponins:   Results from last 7 days   Lab Units 04/13/20  0620 04/12/20  1718   CK TOTAL U/L  --  22*   TROPONIN I ng/mL 0 02 0 02       CBC with diff:   Results from last 7 days   Lab Units 04/12/20  1718   WBC Thousand/uL 8 61   HEMOGLOBIN g/dL 10 9*   HEMATOCRIT % 36 8   MCV fL 105*   PLATELETS Thousands/uL 338   MCH pg 31 1   MCHC g/dL 29 6*   RDW % 12 9   MPV fL 8 6*   NRBC AUTO /100 WBCs 0         CMP:   Results from last 7 days   Lab Units 04/13/20  0620 04/12/20  1718   POTASSIUM mmol/L 4 0 4 2   CHLORIDE mmol/L 100 100   CO2 mmol/L 35* 39*   BUN mg/dL 17 18   CREATININE mg/dL 1 13 1 06   CALCIUM mg/dL 8 6 8 7   AST U/L  --  20   ALT U/L  --  11*   ALK PHOS U/L  --  60   EGFR ml/min/1 73sq m 43 47

## 2020-04-13 NOTE — H&P
H&P- Waqar Perkins 5/12/1930, 80 y o  female MRN: [id number]    Unit/Bed#: -01 Encounter: [id number]    Primary Care Provider: No primary care provider on file  Date and time admitted to hospital: 4/12/2020  4:53 PM        * Acute respiratory failure with hypoxia (HCC)  Assessment & Plan  · Has COPD at baseline and is O2 dependent 2L NC  States began feeling more SOB last night and it became worse throughout the day on day of admission  · CXR obtained in ED revealed "There are small bilateral pleural effusions, and diffuse interstitial prominence "  · Received albuterol neb and Lasix 40mg in ED with improvement in symptoms  Was initially requiring 6L NC, but has now been weaned down to 4L  · COVID testing pending  · Wean O2 as able    Shortness of breath  Assessment & Plan  · Reports increasing SOB since night before admission as above  Is a resident at [facility] where there are several known COVID (+) cases  · CXR findings as above  · Symptoms improved after albuterol neb and Lasix 40mg  · Currently requiring 4L NC  · Received dose of Rocephin and Zithromax in ED  Will await COVID results prior to continuing as Xray is more concerning for volume overload rather than PNA  · Albuterol MDI PRN  · Respiratory protocol  · Incentive spirometry  · Inflammatory markers pending  · Procalcitonin in am    COVID-19 virus infection  Assessment & Plan  · COVID testing resulted (+)  · Zithromax and Rocephin initiated  First dose given in ED  · CRP 46 8  · Continue Vitamin C and Zinc as previously ordered  · Plaquenil per COVID protocol  · Home dose Crestor switched to Lipitor 40mg daily  · Maintain precautions    Constipation  Assessment & Plan  · Reports ongoing constipation   Last BM 3 days ago  · Continue home dose Colace and PRN MOM  · Miralax daily    Centrilobular emphysema (HCC)  Assessment & Plan  · O2 dependent 2L NC  · Now with viral COVID infection  · Continue home dose Albuterol inhaler  · Currently requiring O2 4L NC to maintain O2 sat as above  · Wean O2 as able    CAD (coronary artery disease)  Assessment & Plan  · Continue home dose ASA, Plavix and Lopressor  · Home dose Crestor switched to Lipitor as above    HTN (hypertension)  Assessment & Plan  · BP adequately controlled on current regimen  · Continue home dose Lopressor  · Monitor BP per unit protocol    Generalized osteoarthritis of multiple sites  Assessment & Plan  · Continue home dose Duragesic patch and Neurontin    Hyperlipidemia  Assessment & Plan  · Home dose Crestor switched to Lipitor per COVID protocol as above      VTE Prophylaxis: Heparin  / sequential compression device   Code Status: Level 3 DNR/DNI  POLST: POLST form is not discussed and not completed at this time  Discussion with family: NA    Anticipated Length of Stay:  Patient will be admitted on an Inpatient basis with an anticipated length of stay of  Greater than 2 midnights  Justification for Hospital Stay: See AP above    Total Time for Visit, including Counseling / Coordination of Care: 45 minutes  Greater than 50% of this total time spent on direct patient counseling and coordination of care  Chief Complaint:   Increasing SOB since last night    History of Present Illness:    Biju Oreilly is a 80 y o  female who presents with increasing SOB since night before admission as above  Is a resident at [facility] where there are several known COVID (+) cases  Has COPD at baseline and is O2 dependent 2L NC  States began feeling more SOB last night and it became worse throughout the day on day of admission  Symptoms improved after albuterol neb and Lasix 40mg in ED    Review of Systems:    Review of Systems   Constitutional: Positive for appetite change (decreased)  Negative for chills and fever  HENT: Negative for congestion, ear pain, sinus pressure and sore throat  Eyes: Negative for visual disturbance     Respiratory: Positive for cough (non productive) and shortness of breath  Negative for wheezing  Cardiovascular: Negative for chest pain, palpitations and leg swelling  Gastrointestinal: Positive for constipation and nausea  Negative for diarrhea and vomiting  Genitourinary: Negative for difficulty urinating, dysuria, frequency and urgency  Musculoskeletal: Negative for neck pain and neck stiffness  Neurological: Positive for headaches  Negative for dizziness, syncope and light-headedness  All other systems reviewed and are negative  Past Medical and Surgical History:     Past Medical History:   Diagnosis Date    Cardiac disease     COPD (chronic obstructive pulmonary disease) ([place] [address] )     Hyperlipidemia     Hypertension     MI (myocardial infarction) ([place] [address] )     PVD (peripheral vascular disease) ([place] [address] )     Stroke ([place] [address] )        Past Surgical History:   Procedure Laterality Date    APPENDECTOMY      BREAST SURGERY      CHOLECYSTECTOMY      CORONARY ANGIOPLASTY      ESOPHAGUS FOREIGN BODY REMOVAL N/A 6/13/2017    Procedure: REMOVAL FOREIGN BODY ESOPHAGUS;  Surgeon: Perfecto Leach MD;  Location: [facility];  Service: Gastroenterology       Meds/Allergies:    Prior to Admission medications    Medication Sig Start Date End Date Taking?  Authorizing Provider   acetaminophen (TYLENOL) 325 mg tablet Take 650 mg by mouth every 6 (six) hours as needed for mild pain   Yes Historical Provider, MD   albuterol (2 5 mg/3 mL) 0 083 % nebulizer solution Take 1 vial (2 5 mg total) by nebulization every 6 (six) hours as needed for wheezing 3/24/19  Yes Amy Verduzco MD   ALPRAZolam Layvonne Ala) 0 25 mg tablet  12/22/19  Yes Historical Provider, MD   aspirin (ECOTRIN LOW STRENGTH) 81 mg EC tablet Take 81 mg by mouth daily   Yes Historical Provider, MD   bisacodyl (DULCOLAX) 10 mg suppository Insert 10 mg into the rectum daily   Yes Historical Provider, MD   clopidogrel (PLAVIX) 75 mg tablet Take 75 mg by mouth daily   Yes Historical Provider, MD docusate sodium (COLACE) 100 mg capsule Take 100 mg by mouth 2 (two) times a day   Yes Historical Provider, MD   fentaNYL (DURAGESIC) 12 mcg/hr TD 72 hr patch  12/30/19  Yes Historical Provider, MD   FEROSUL 325 (65 Fe) MG tablet  12/1/19  Yes Historical Provider, MD   gabapentin (NEURONTIN) 100 mg capsule  12/19/19  Yes Historical Provider, MD   magnesium hydroxide (MILK OF MAGNESIA) 400 mg/5 mL oral suspension Take 30 mL by mouth daily as needed for constipation   Yes Historical Provider, MD   menthol-cetylpyridinium (CEPACOL) 3 MG lozenge Take 1 lozenge by mouth as needed for sore throat   Yes Historical Provider, MD   methocarbamol (ROBAXIN) 500 mg tablet  12/13/19  Yes Historical Provider, MD   methocarbamol (ROBAXIN) 750 mg tablet Take 500 mg by mouth every 6 (six) hours as needed for muscle spasms   Yes Historical Provider, MD   metoprolol tartrate (LOPRESSOR) 25 mg tablet Take 25 mg by mouth every 12 (twelve) hours   Yes Historical Provider, MD   naproxen (NAPROSYN) 500 mg tablet  12/26/19  Yes Historical Provider, MD   nitroglycerin (NITROSTAT) 0 4 mg SL tablet  12/6/19  Yes Historical Provider, MD   ondansetron (ZOFRAN) 4 mg tablet Take 4 mg by mouth every 8 (eight) hours as needed for nausea or vomiting   Yes Historical Provider, MD   pantoprazole (PROTONIX) 40 mg tablet Take 40 mg by mouth daily   Yes Historical Provider, MD   rosuvastatin (CRESTOR) 10 MG tablet Take 10 mg by mouth daily   Yes Historical Provider, MD   traMADol (ULTRAM) 50 mg tablet  12/17/19  Yes Historical Provider, MD   melatonin 3 mg Take 3 mg by mouth daily at bedtime    Historical Provider, MD   predniSONE 10 mg tablet Take 50 mg for 5 days  3/24/19   Krystina Villanueva MD     I have reviewed home medications with patient personally  Allergies:    Allergies   Allergen Reactions    Amoxicillin Hives and Vomiting     unsure    Mevacor [Lovastatin] Myalgia    Penicillins Hives and Vomiting     unsure       Social History:     Marital Status:    Patient Pre-hospital Living Situation: Resides at [facility]  Patient Pre-hospital Level of Mobility: W/C bound  Patient Pre-hospital Diet Restrictions: None  Substance Use History:   Social History     Substance and Sexual Activity   Alcohol Use Not Currently    Frequency: Never     Social History     Tobacco Use   Smoking Status Former Smoker    Types: Cigarettes   Smokeless Tobacco Former User   Tobacco Comment    Quit 2 5 years ago     Social History     Substance and Sexual Activity   Drug Use No       Family History:    Family History   Family history unknown: Yes       Physical Exam:     Vitals:   Blood Pressure: 130/74 (04/12/20 2329)  Pulse: 90 (04/12/20 2329)  Temperature: 98 7 °F (37 1 °C) (04/12/20 2329)  Temp Source: Temporal (04/12/20 1937)  Respirations: 18 (04/12/20 2051)  Height: 5' 2" (157 5 cm) (04/12/20 1656)  Weight - Scale: 66 1 kg (145 lb 11 6 oz) (04/12/20 1656)  SpO2: 99 % (04/12/20 2329)    Physical Exam   Constitutional: She is oriented to person, place, and time  She appears well-developed and well-nourished  No distress  HENT:   Head: Normocephalic and atraumatic  Eyes: Pupils are equal, round, and reactive to light  EOM are normal    Neck: Normal range of motion  Neck supple  Cardiovascular: Normal rate, regular rhythm, normal heart sounds and intact distal pulses  Exam reveals no gallop and no friction rub  No murmur heard  Pulmonary/Chest: Effort normal  No respiratory distress  She has no wheezes  She has rales (BLL)  Abdominal: Soft  Bowel sounds are normal  There is no tenderness  There is no rebound and no guarding  Musculoskeletal: Normal range of motion  She exhibits no edema or tenderness  Neurological: She is alert and oriented to person, place, and time  Skin: Skin is warm and dry  Psychiatric: She has a normal mood and affect   Her behavior is normal  Thought content normal        Additional Data:     Lab Results: I have personally reviewed pertinent reports  Results from last 7 days   Lab Units 04/12/20  1718   WBC Thousand/uL 8 61   HEMOGLOBIN g/dL 10 9*   HEMATOCRIT % 36 8   PLATELETS Thousands/uL 338   NEUTROS PCT % 77*   LYMPHS PCT % 14   MONOS PCT % 6   EOS PCT % 1     Results from last 7 days   Lab Units 04/12/20  1718   SODIUM mmol/L 141   POTASSIUM mmol/L 4 2   CHLORIDE mmol/L 100   CO2 mmol/L 39*   BUN mg/dL 18   CREATININE mg/dL 1 06   ANION GAP mmol/L 2*   CALCIUM mg/dL 8 7   ALBUMIN g/dL 2 7*   TOTAL BILIRUBIN mg/dL 0 30   ALK PHOS U/L 60   ALT U/L 11*   AST U/L 20   GLUCOSE RANDOM mg/dL 127                 Results from last 7 days   Lab Units 04/12/20  1718   LACTIC ACID mmol/L 1 4       Imaging: I have personally reviewed pertinent reports  XR chest 1 view portable   ED Interpretation by Enrrique Gifford DO (04/12 1837)   Concern for infiltrates vs dependent edema  Final Result by Dixon Garcia MD (04/12 1857)      There are small bilateral pleural effusions, and diffuse interstitial prominence  Findings are more typical of interstitial pulmonary edema than COVID-19 pneumonia  Workstation performed: [id number]             EKG, Pathology, and Other Studies Reviewed on Admission:   · EKG: SR  No acute ischemic changes  QTc 449    Allscripts / Epic Records Reviewed: Yes     ** Please Note: This note has been constructed using a voice recognition system   **

## 2020-04-13 NOTE — ASSESSMENT & PLAN NOTE
· COVID testing resulted (+)  · Zithromax and Rocephin initiated   First dose given in ED  · CRP 46 8  · Continue Vitamin C and Zinc as previously ordered  · Plaquenil per COVID protocol  · Home dose Crestor switched to Lipitor 40mg daily  · Maintain precautions

## 2020-04-13 NOTE — ED NOTES
CC- Sob and cough, resident at [facility], has been exposed to covid   Per ems pts O2 sat was 71% on 2L on arrival    Admission related to injury?- n/a    Orientation status- A/0 x4    Abnormal labs/abnormal focused assessment/vitals- CXR- pneumonia, normally wears 2L O2 NC at home, currently wearing 4L NC    Medication/drips- 40 mg lasix @ 1727, 8 puff albuterol @1733, 1g ceftriaxone @1956, 500mg azithromycin @2000    Last time narcotics given- n/a     IV lines/drains/etc- #18 R AC, #20 L wrist    Isolation status- contact and airborne    Skin- not fully assessed in ED    BMAT screening tool- assist x2, been using bedpan to urinate (she can roll and lift her bottom)     ED nurse's name and phone number- [address], RN  04/12/20 2029

## 2020-04-13 NOTE — ASSESSMENT & PLAN NOTE
· Reports increasing SOB since night before admission as above  Is a resident at [facility] where there are several known COVID (+) cases  · CXR findings as above  · Symptoms improved after albuterol neb and Lasix 40mg  · Currently requiring 4L NC  · Received dose of Rocephin and Zithromax in ED  Will await COVID results prior to continuing as Xray is more concerning for volume overload rather than PNA    · Albuterol MDI PRN  · Respiratory protocol  · Incentive spirometry  · Inflammatory markers pending  · Procalcitonin in am

## 2020-04-13 NOTE — QUICK NOTE
Called to bedside by nursing staff reporting increasing oxygen demands  Appears pale and clammy  States she fells similar to when she first came in with increased SOB  RT at bedside and had admin Albuterol MDI with improvement in wheezing  Currently O2 sat 90% on 6L  Does have coarse inspiratory crackles diffusely  BP only 90/70 manually  Also with c/o nausea and just received IV Zofran  Currently is level 3 DNR/DNI  - Albumin 5% 12 5g IV x 1 now  - Lasix 20mg IV after albumin hung  - POCT glucose now  - 12 lead EKG  - Serial troponin starting now with am lab    Called and spoke with daughter Dony Petty, whom is a nurse, and updated her in Ms Hendrix's status and the fact that her COVID testing came back (+)   She states they are prepared for the worst given her mom's underlying heart and lung problems, and would like to be called with any further updates

## 2020-04-13 NOTE — RESPIRATORY THERAPY NOTE
RT Protocol Note  Ghada Montiel 80 y o  female MRN: [id number]  Unit/Bed#: -01 Encounter: [id number]    Assessment    Principal Problem:    Acute respiratory failure with hypoxia ([place] [address] )  Active Problems:    HTN (hypertension)    CAD (coronary artery disease)    Hyperlipidemia    Generalized osteoarthritis of multiple sites    Centrilobular emphysema (HCC)    Shortness of breath    Constipation    COVID-19 virus infection      Home Pulmonary Medications:  nebulizer  Home Devices/Therapy: Home O2    Past Medical History:   Diagnosis Date    Cardiac disease     COPD (chronic obstructive pulmonary disease) ([place] [address] )     Hyperlipidemia     Hypertension     MI (myocardial infarction) ([place] [address] )     PVD (peripheral vascular disease) ([place] [address] )     Stroke ([place] [address] )      Social History     Socioeconomic History    Marital status:       Spouse name: None    Number of children: None    Years of education: None    Highest education level: None   Occupational History    None   Social Needs    Financial resource strain: None    Food insecurity:     Worry: None     Inability: None    Transportation needs:     Medical: None     Non-medical: None   Tobacco Use    Smoking status: Former Smoker     Types: Cigarettes    Smokeless tobacco: Former User    Tobacco comment: Quit 2 5 years ago   Substance and Sexual Activity    Alcohol use: Not Currently     Frequency: Never    Drug use: No    Sexual activity: None   Lifestyle    Physical activity:     Days per week: None     Minutes per session: None    Stress: None   Relationships    Social connections:     Talks on phone: None     Gets together: None     Attends [employer] service: None     Active member of club or organization: None     Attends meetings of clubs or organizations: None     Relationship status: None    Intimate partner violence:     Fear of current or ex partner: None     Emotionally abused: None     Physically abused: None     Forced sexual activity: None   Other Topics Concern    None   Social History Narrative    None       Subjective    Subjective Data: awake with moderate respiratory distress    Objective    Physical Exam:   Assessment Type: Assess only  General Appearance: Alert, Awake  Respiratory Pattern: Spontaneous, Dyspnea with exertion, Irregular  Chest Assessment: Chest expansion symmetrical  Bilateral Breath Sounds: Diminished, Scattered, Expiratory wheezes  Cough: Non-productive, Dry  O2 Device: nasal cannula    Vitals:  Blood pressure 130/74, pulse 91, temperature 98 7 °F (37 1 °C), resp  rate 20, height 5' 2" (1 575 m), weight 66 1 kg (145 lb 11 6 oz), SpO2 92 %  Imaging and other studies: I have personally reviewed pertinent reports  O2 Device: nasal cannula     Plan    Respiratory Plan: Moderate/Severe Distress pathway        Resp Comments: respiratory protocol completed at this time patient with noted work of breathing after being placed on bed pan mdi given and liter flow of oxygen increased with little improvement nursing made aware patient has significant pulmonary PMH for emphysema ordered on inhalers PRN and will be continued

## 2020-04-13 NOTE — PLAN OF CARE
Problem: Potential for Falls  Goal: Patient will remain free of falls  Description  INTERVENTIONS:  - Assess patient frequently for physical needs  -  Identify cognitive and physical deficits and behaviors that affect risk of falls    -  [place] fall precautions as indicated by assessment   - Educate patient/family on patient safety including physical limitations  - Instruct patient to call for assistance with activity based on assessment  - Modify environment to reduce risk of injury  - Consider OT/PT consult to assist with strengthening/mobility  Outcome: Progressing     Problem: Prexisting or High Potential for Compromised Skin Integrity  Goal: Skin integrity is maintained or improved  Description  INTERVENTIONS:  - Identify patients at risk for skin breakdown  - Assess and monitor skin integrity  - Assess and monitor nutrition and hydration status  - Monitor labs   - Assess for incontinence   - Turn and reposition patient  - Assist with mobility/ambulation  - Relieve pressure over bony prominences  - Avoid friction and shearing  - Provide appropriate hygiene as needed including keeping skin clean and dry  - Evaluate need for skin moisturizer/barrier cream  - Collaborate with interdisciplinary team   - Patient/family teaching  - Consider wound care consult   Outcome: Progressing     Problem: RESPIRATORY - ADULT  Goal: Achieves optimal ventilation and oxygenation  Description  INTERVENTIONS:  - Assess for changes in respiratory status  - Assess for changes in mentation and behavior  - Position to facilitate oxygenation and minimize respiratory effort  - Oxygen administered by appropriate delivery if ordered  - Initiate smoking cessation education as indicated  - Encourage broncho-pulmonary hygiene including cough, deep breathe, Incentive Spirometry  - Assess the need for suctioning and aspirate as needed  - Assess and instruct to report SOB or any respiratory difficulty  - Respiratory Therapy support as indicated  Outcome: Progressing

## 2020-04-13 NOTE — QUICK NOTE
I have spoken with patient's daughter over the phone  As I also talked to the patient through the window, she was struggling for air  She asked something to help her breathe better and make her comfortable  After discussion with patient's daughter, decided that no heroic measures for patient and to make her comfortable  Full progress note to follow

## 2020-04-14 LAB
ATRIAL RATE: 93 BPM
P AXIS: 60 DEGREES
PR INTERVAL: 208 MS
QRS AXIS: 63 DEGREES
QRSD INTERVAL: 76 MS
QT INTERVAL: 366 MS
QTC INTERVAL: 445 MS
T WAVE AXIS: 65 DEGREES
VENTRICULAR RATE: 89 BPM

## 2020-04-14 PROCEDURE — 99232 SBSQ HOSP IP/OBS MODERATE 35: CPT | Performed by: GENERAL PRACTICE

## 2020-04-14 PROCEDURE — 93010 ELECTROCARDIOGRAM REPORT: CPT | Performed by: INTERNAL MEDICINE

## 2020-04-14 RX ORDER — BISACODYL 10 MG
10 SUPPOSITORY, RECTAL RECTAL DAILY PRN
Status: DISCONTINUED | OUTPATIENT
Start: 2020-04-14 | End: 2020-04-15 | Stop reason: HOSPADM

## 2020-04-14 RX ADMIN — LORAZEPAM 0.5 MG: 2 INJECTION INTRAMUSCULAR; INTRAVENOUS at 16:46

## 2020-04-14 RX ADMIN — MORPHINE SULFATE 2 MG: 2 INJECTION, SOLUTION INTRAMUSCULAR; INTRAVENOUS at 08:50

## 2020-04-14 RX ADMIN — ONDANSETRON 4 MG: 2 INJECTION INTRAMUSCULAR; INTRAVENOUS at 16:47

## 2020-04-14 RX ADMIN — LIDOCAINE 1 PATCH: 50 PATCH TOPICAL at 08:51

## 2020-04-14 RX ADMIN — MORPHINE SULFATE 2 MG: 2 INJECTION, SOLUTION INTRAMUSCULAR; INTRAVENOUS at 16:45

## 2020-04-14 NOTE — PLAN OF CARE
Problem: Potential for Falls  Goal: Patient will remain free of falls  Description  INTERVENTIONS:  - Assess patient frequently for physical needs  -  Identify cognitive and physical deficits and behaviors that affect risk of falls    -  [place] fall precautions as indicated by assessment   - Educate patient/family on patient safety including physical limitations  - Instruct patient to call for assistance with activity based on assessment  - Modify environment to reduce risk of injury  - Consider OT/PT consult to assist with strengthening/mobility  Outcome: Progressing     Problem: Prexisting or High Potential for Compromised Skin Integrity  Goal: Skin integrity is maintained or improved  Description  INTERVENTIONS:  - Identify patients at risk for skin breakdown  - Assess and monitor skin integrity  - Assess and monitor nutrition and hydration status  - Monitor labs   - Assess for incontinence   - Turn and reposition patient  - Assist with mobility/ambulation  - Relieve pressure over bony prominences  - Avoid friction and shearing  - Provide appropriate hygiene as needed including keeping skin clean and dry  - Evaluate need for skin moisturizer/barrier cream  - Collaborate with interdisciplinary team   - Patient/family teaching  - Consider wound care consult   Outcome: Progressing     Problem: RESPIRATORY - ADULT  Goal: Achieves optimal ventilation and oxygenation  Description  INTERVENTIONS:  - Assess for changes in respiratory status  - Assess for changes in mentation and behavior  - Position to facilitate oxygenation and minimize respiratory effort  - Oxygen administered by appropriate delivery if ordered  - Initiate smoking cessation education as indicated  - Encourage broncho-pulmonary hygiene including cough, deep breathe, Incentive Spirometry  - Assess the need for suctioning and aspirate as needed  - Assess and instruct to report SOB or any respiratory difficulty  - Respiratory Therapy support as indicated  Outcome: Progressing

## 2020-04-14 NOTE — PROGRESS NOTES
Progress Note - Raphael Willoughby 5/12/1930, 80 y o  female MRN: [id number]    Unit/Bed#: -01 Encounter: [id number]    Primary Care Provider: No primary care provider on file  Date and time admitted to hospital: 4/12/2020  4:53 PM        COVID-19 virus infection  Assessment & Plan  She was started on treatment for add-what ever available, however, given patient's significant decline and distress and plan for comfort care, all those medications have been  Generalized osteoarthritis of multiple sites  Assessment & Plan  Pain control and comfort care    CAD (coronary artery disease)  Assessment & Plan  Plan for comfort care and stop all the medications    HTN (hypertension)  Assessment & Plan  Plan as above    * Acute respiratory failure with hypoxia (HCC)  Assessment & Plan  This is likely multifactorial including COVID 19 infection/heart failure/COPD exacerbation and not sure if she has thromboembolism  She is almost 80years old  I spoke with patient's daughter over the phone couple of times  Plan is no aggressive treatments  Patient basically was awake and alert when I saw her initially and she was bagging for something to be given for her to relax with significant respiratory distress  Patient is on comfort care  Patient was also evaluated by Cardiology service  VTE Pharmacologic Prophylaxis:   Pharmacologic: Pharmacologic VTE Prophylaxis contraindicated due to comfort care  Mechanical VTE Prophylaxis in Place: Yes    Patient Centered Rounds: I have performed bedside rounds with nursing staff today  Discussions with Specialists or Other Care Team Provider:     Education and Discussions with Family / Patient:     Time Spent for Care: 30 minutes  More than 50% of total time spent on counseling and coordination of care as described above      Current Length of Stay: 2 day(s)    Current Patient Status: Inpatient   Certification Statement: The patient will continue to require additional inpatient hospital stay due to comfort care    Discharge Plan: comfort care      Code Status: Level 4 - Comfort Care      Subjective:   Awake/alert    Objective:     Vitals:   Temp (24hrs), Av 9 °F (36 6 °C), Min:97 1 °F (36 2 °C), Max:98 6 °F (37 °C)    Temp:  [97 1 °F (36 2 °C)-98 6 °F (37 °C)] 97 1 °F (36 2 °C)  HR:  [] 108  Resp:  [20-28] 20  BP: (136-167)/(77-84) 167/84  SpO2:  [92 %-100 %] 93 %  Body mass index is 26 65 kg/m²  Input and Output Summary (last 24 hours): Intake/Output Summary (Last 24 hours) at 2020 1035  Last data filed at 2020 0900  Gross per 24 hour   Intake 240 ml   Output 600 ml   Net -360 ml       Physical Exam:     Physical Exam   Constitutional: She is oriented to person, place, and time  HENT:   Head: Normocephalic  Eyes: Pupils are equal, round, and reactive to light  Neck: Neck supple  Cardiovascular: Normal rate and regular rhythm  Pulmonary/Chest: No respiratory distress  Abdominal: Soft  Bowel sounds are normal    Musculoskeletal: She exhibits no edema  Neurological: She is alert and oriented to person, place, and time  Skin: Skin is warm and dry  She is not diaphoretic  Psychiatric: She has a normal mood and affect   Her behavior is normal              Additional Data:     Labs:    Results from last 7 days   Lab Units 20  1018 20  0620 20  1718   WBC Thousand/uL  --  7 24 8 61   HEMOGLOBIN g/dL  --  10 3* 10 9*   HEMATOCRIT %  --  35 4 36 8   PLATELETS Thousands/uL 368 307 338   BANDS PCT %  --  4  --    NEUTROS PCT %  --   --  77*   LYMPHS PCT %  --   --  14   LYMPHO PCT %  --  18  --    MONOS PCT %  --   --  6   MONO PCT %  --  11  --    EOS PCT %  --  3 1     Results from last 7 days   Lab Units 20  0620 20  1718   SODIUM mmol/L 141 141   POTASSIUM mmol/L 4 0 4 2   CHLORIDE mmol/L 100 100   CO2 mmol/L 35* 39*   BUN mg/dL 17 18   CREATININE mg/dL 1 13 1 06   ANION GAP mmol/L 6 2*   CALCIUM mg/dL 8 6 8 7 ALBUMIN g/dL  --  2 7*   TOTAL BILIRUBIN mg/dL  --  0 30   ALK PHOS U/L  --  60   ALT U/L  --  11*   AST U/L  --  20   GLUCOSE RANDOM mg/dL 108 127         Results from last 7 days   Lab Units 04/13/20  0548   POC GLUCOSE mg/dl 114         Results from last 7 days   Lab Units 04/13/20  1018 04/12/20  1718   LACTIC ACID mmol/L  --  1 4   PROCALCITONIN ng/ml 0 05  --            * I Have Reviewed All Lab Data Listed Above  * Additional Pertinent Lab Tests Reviewed:    Imaging:    Imaging Reports Reviewed Today Include:   Imaging Personally Reviewed by Myself Includes:      Recent Cultures (last 7 days):     Results from last 7 days   Lab Units 04/12/20  1732 04/12/20  1718   BLOOD CULTURE  No Growth at 24 hrs  No Growth at 24 hrs  Last 24 Hours Medication List:     Current Facility-Administered Medications:  albuterol 2 puff Inhalation Q4H PRN Valentine Vee MD   fentaNYL 25 mcg Transdermal Q72H Khloe Collar, PA-TRI   lidocaine 1 patch Topical Daily Khloe CollarLAWRENCE   LORazepam 0 5 mg Intravenous Q4H PRN Josie Silva MD   morphine injection 2 mg Intravenous Q3H PRN Josie Silva MD   ondansetron 4 mg Intravenous Q6H PRN Khloe Romo PA-C        Today, Patient Was Seen By: Onidna Cornell MD    ** Please Note: Dictation voice to text software may have been used in the creation of this document   **

## 2020-04-15 VITALS
HEART RATE: 114 BPM | BODY MASS INDEX: 26.82 KG/M2 | DIASTOLIC BLOOD PRESSURE: 53 MMHG | WEIGHT: 145.72 LBS | TEMPERATURE: 98.4 F | OXYGEN SATURATION: 95 % | RESPIRATION RATE: 18 BRPM | HEIGHT: 62 IN | SYSTOLIC BLOOD PRESSURE: 129 MMHG

## 2020-04-15 LAB — IL6 SERPL-MCNC: 31 PG/ML (ref 0–15.5)

## 2020-04-15 PROCEDURE — 99239 HOSP IP/OBS DSCHRG MGMT >30: CPT | Performed by: GENERAL PRACTICE

## 2020-04-15 PROCEDURE — 99232 SBSQ HOSP IP/OBS MODERATE 35: CPT | Performed by: GENERAL PRACTICE

## 2020-04-15 RX ORDER — MORPHINE SULFATE 15 MG/1
7.5 TABLET ORAL EVERY 4 HOURS PRN
Status: DISCONTINUED | OUTPATIENT
Start: 2020-04-15 | End: 2020-04-15 | Stop reason: HOSPADM

## 2020-04-15 RX ORDER — LORAZEPAM 0.5 MG/1
0.5 TABLET ORAL EVERY 6 HOURS PRN
Qty: 12 TABLET | Refills: 0 | Status: SHIPPED | OUTPATIENT
Start: 2020-04-15 | End: 2020-04-25

## 2020-04-15 RX ORDER — AZITHROMYCIN 250 MG/1
250 TABLET, FILM COATED ORAL EVERY 24 HOURS
Status: DISCONTINUED | OUTPATIENT
Start: 2020-04-15 | End: 2020-04-15 | Stop reason: HOSPADM

## 2020-04-15 RX ORDER — MORPHINE SULFATE 15 MG/1
7.5 TABLET ORAL EVERY 4 HOURS PRN
Qty: 30 TABLET | Refills: 0 | Status: SHIPPED | OUTPATIENT
Start: 2020-04-15 | End: 2020-04-25

## 2020-04-15 RX ORDER — ONDANSETRON 4 MG/1
4 TABLET, ORALLY DISINTEGRATING ORAL EVERY 6 HOURS PRN
Status: DISCONTINUED | OUTPATIENT
Start: 2020-04-15 | End: 2020-04-15 | Stop reason: HOSPADM

## 2020-04-15 RX ORDER — HYDROXYCHLOROQUINE SULFATE 200 MG/1
200 TABLET, FILM COATED ORAL 2 TIMES DAILY WITH MEALS
Qty: 6 TABLET | Refills: 0
Start: 2020-04-15 | End: 2020-04-24 | Stop reason: HOSPADM

## 2020-04-15 RX ORDER — LORAZEPAM 0.5 MG/1
0.5 TABLET ORAL EVERY 6 HOURS PRN
Status: DISCONTINUED | OUTPATIENT
Start: 2020-04-15 | End: 2020-04-15 | Stop reason: HOSPADM

## 2020-04-15 RX ORDER — HYDROXYCHLOROQUINE SULFATE 200 MG/1
200 TABLET, FILM COATED ORAL 2 TIMES DAILY
Status: DISCONTINUED | OUTPATIENT
Start: 2020-04-15 | End: 2020-04-15 | Stop reason: HOSPADM

## 2020-04-15 RX ORDER — ONDANSETRON 4 MG/1
4 TABLET, ORALLY DISINTEGRATING ORAL EVERY 6 HOURS PRN
Qty: 20 TABLET | Refills: 0 | Status: SHIPPED | OUTPATIENT
Start: 2020-04-15 | End: 2020-04-19

## 2020-04-15 RX ORDER — AZITHROMYCIN 250 MG/1
250 TABLET, FILM COATED ORAL EVERY 24 HOURS
Qty: 3 TABLET | Refills: 0
Start: 2020-04-15 | End: 2020-04-19

## 2020-04-15 RX ADMIN — LORAZEPAM 0.5 MG: 0.5 TABLET ORAL at 11:02

## 2020-04-15 RX ADMIN — MORPHINE SULFATE 7.5 MG: 15 TABLET ORAL at 11:02

## 2020-04-15 RX ADMIN — LORAZEPAM 0.5 MG: 2 INJECTION INTRAMUSCULAR; INTRAVENOUS at 04:14

## 2020-04-15 NOTE — ASSESSMENT & PLAN NOTE
This is likely multifactorial including COVID 19 infection/heart failure/COPD exacerbation and not sure if she has thromboembolism  She is almost 80years old  I spoke with patient's daughter over the phone couple of times  Plan is no aggressive treatments  Case d/w daughter,  She is comfort care  Resides at [facility] consulted for d/c planning

## 2020-04-15 NOTE — SOCIAL WORK
LOS 3   CM phoned Kathy who confirmed patient reside at [facility] since Jan 8th 2019  Dtr  Nicholas Villalobos wants patient to return to [facility]  [facility]erral is sent to [facility] and they are prepared to accept patient today  CM scheduled transport for 3PM today with [facility]  Kahty is informed of IMM  IMM is in the chart  Patient, Kathy and treatment team are informed of above

## 2020-04-15 NOTE — PROGRESS NOTES
Patient noted to have O2 of 100% on 6L nasal cannula  Patient titrated down to 4L  Tolerating well  Still 100% O2 on nasal cannula  Will continue to monitor  Jose Altamirano, BETO  3:55 AM  04/15/20  ====================================================  While attempting to place new IV, patient became extremely agitated, fighting staff, punching and throwing items  PRN ativan given to patient  Patient's O2 dropped to low 80's, patient returned to 6L nasal cannula  Patient's O2 now 99% on 6L  Attempted to calm patient down, patient resting in bed  Will continue to monitor     Jose Altamirano, BETO  4:42 AM  04/15/20  ====================================================

## 2020-04-15 NOTE — TRANSPORTATION MEDICAL NECESSITY
Section I - General Information    Name of Patient: Michael Enriquez                 : 1930    Medicare #: [id number]  Transport Date: 04/15/20 (PCS is valid for round trips on this date and for all repetitive trips in the 60-day range as noted below )  Origin: [place] [address]: [address]  Is the pt's stay covered under Medicare Part A (PPS/DRG)   [x]     Closest appropriate facility? If no, why is transport to more distant facility required? Yes  If hospice pt, is this transport related to pt's terminal illness? No       Section II - Medical Necessity Questionnaire  Ambulance transportation is medically necessary only if other means of transport are contraindicated or would be potentially harmful to the patient  To meet this requirement, the patient must either be "bed confined" or suffer from a condition such that transport by means other than ambulance is contraindicated by the patient's condition  The following questions must be answered by the medical professional signing below for this form to be valid:    1)  Describe the MEDICAL CONDITION (physical and/or mental) of this patient AT [address] [place] [address] that requires the patient to be transported in an ambulance and why transport by other means is contraindicated by the patient's condition: PATIENT IS ON FIVE LITERS OF OXYGEN  PATIENT IS VERY WEAK  PATIENT IS A HIGH FALL RISK  PATIENT IS COVID-19 POSITIVE  2) Is the patient "bed confined" as defined below? Yes  To be "be confined" the patient must satisfy all three of the following conditions: (1) unable to get up from bed without Assistance; AND (2) unable to ambulate; AND (3) unable to sit in a chair or wheelchair  3) Can this patient safely be transported by car or wheelchair van (i e , seated during transport without a medical attendant or monitoring)?    No    4) In addition to completing questions 1-3 above, please check any of the following conditions that apply*:   *Note: supporting documentation for any boxes checked must be maintained in the patient's medical records  If hosp-hosp transfer, describe services needed at 2nd facility not available at 1st facility? Patient is confused  Moderate/severe pain on movement   Medical attendant required   Requires oxygen-unable to self administer  Special handling/isolation/infection control precautions required       Section III - Signature of Physician or Healthcare Professional  I certify that the above information is true and correct based on my evaluation of this patient, and represent that the patient requires transport by ambulance and that other forms of transport are contraindicated  I understand that this information will be used by the Centers for Medicare and Medicaid Services (CMS) to support the determination of medical necessity for ambulance services, and I represent that I have personal knowledge of the patient's condition at time of transport  []  If this box is checked, I also certify that the patient is physically or mentally incapable of signing the ambulance service's claim and that the institution with which I am affiliated has furnished care, services, or assistance to the patient  My signature below is made on behalf of the patient pursuant to 42 CFR §424 36(b)(4)  In accordance with 42 CFR §424 37, the specific reason(s) that the patient is physically or mentally incapable of signing the claim form is as follows: N/A        Signature of Physician* or Healthcare Professional______________________________________________________________  Signature Date 04/15/20 (For scheduled repetitive transports, this form is not valid for transports performed more than 60 days after this date)    Printed Name & Credentials of Physician or Healthcare Professional (MD, DO, RN, etc )_ANNA MERCEDES, MSW__  *Form must be signed by patient's attending physician for scheduled, repetitive transports   For non-repetitive, unscheduled ambulance transports, if unable to obtain the signature of the attending physician, any of the following may sign (choose appropriate option below)  [] Physician Assistant []  Clinical Nurse Specialist []  Registered Nurse  []  Nurse Practitioner  [x] Discharge Planner

## 2020-04-15 NOTE — DISCHARGE SUMMARY
Discharge- Alexandra Carey 5/12/1930, 80 y o  female MRN: [id number]    Unit/Bed#: -01 Encounter: [id number]    Primary Care Provider: No primary care provider on file  Date and time admitted to hospital: 4/12/2020  4:53 PM        * Acute respiratory failure with hypoxia [facility])  Assessment & Plan  This is likely multifactorial including COVID 19 infection/heart failure/COPD exacerbation and not sure if she has thromboembolism  She is almost 80years old  I spoke with patient's daughter over the phone couple of times  Plan is no aggressive treatments  Case d/w daughter,  She is comfort care  Resides at [facility] consulted for d/c planning  Discharging Physician / Practitioner: Bc Abdi MD  PCP: No primary care provider on file  Admission Date:   Admission Orders (From admission, onward)     Ordered        04/12/20 1904  Inpatient Admission  Once                   Discharge Date: 04/15/20    Resolved Problems  Date Reviewed: 4/15/2020    None          Consultations During Hospital Stay:   cardiology    Procedures Performed:   ·     Significant Findings / Test Results:   · COVID pneumonia  · Comfort care    Incidental Findings:   ·     Test Results Pending at Discharge (will require follow up):   ·      Outpatient Tests Requested:  ·     Complications:      Reason for Admission: increasing shortness of breath    Hospital Course: Alexandra Carey is a 80 y o  female who presents with increasing SOB since night before admission as above  Is a resident at [facility] where there are several known COVID (+) cases  Has COPD at baseline and is O2 dependent 2L NC  States began feeling more SOB last night and it became worse throughout the day on day of admission  Symptoms improved after albuterol neb and Lasix 40mg in ED    Please see above list of diagnoses and related plan for additional information       Condition at Discharge: stable     Discharge Day Visit / Exam:     * Please refer to separate progress note for these details *    Discussion with Family:     Discharge instructions/Information to patient and family:   See after visit summary for information provided to patient and family  Provisions for Follow-Up Care:  See after visit summary for information related to follow-up care and any pertinent home health orders  Disposition:     Home    For Discharges to [facility] SNF:   · Not Applicable to this Patient - Not Applicable to this Patient    Planned Readmission:      Discharge Statement:  I spent 40 minutes discharging the patient  This time was spent on the day of discharge  I had direct contact with the patient on the day of discharge  Greater than 50% of the total time was spent examining patient, answering all patient questions, arranging and discussing plan of care with patient as well as directly providing post-discharge instructions  Additional time then spent on discharge activities  Discharge Medications:  See after visit summary for reconciled discharge medications provided to patient and family        ** Please Note: This note has been constructed using a voice recognition system **

## 2020-04-15 NOTE — PROGRESS NOTES
Progress Note - [place] 1930, 80 y o  female MRN: [id number]    Unit/Bed#: -01 Encounter: [id number]    Primary Care Provider: No primary care provider on file  Date and time admitted to hospital: 2020  4:53 PM        * Acute respiratory failure with hypoxia [facility])  Assessment & Plan  This is likely multifactorial including COVID 19 infection/heart failure/COPD exacerbation and not sure if she has thromboembolism  She is almost 80years old  I spoke with patient's daughter over the phone couple of times  Plan is no aggressive treatments  Case d/w daughter,  She is comfort care  Resides at [facility] consulted for d/c planning  Case d/w daughter just prior to discharge, she now wants to continue patient's maintenance meds and complete course  Of plaquenil and zmax as she has been doing better than expected    VTE Pharmacologic Prophylaxis:   Pharmacologic: Pharmacologic VTE Prophylaxis contraindicated due to comfort care  Mechanical VTE Prophylaxis in Place: Yes    Patient Centered Rounds: I have performed bedside rounds with nursing staff today  Discussions with Specialists or Other Care Team Provider:     Education and Discussions with Family / Patient:     Time Spent for Care: 30 minutes  More than 50% of total time spent on counseling and coordination of care as described above  Current Length of Stay: 3 day(s)    Current Patient Status: Inpatient   Certification Statement: The patient will continue to require additional inpatient hospital stay due to return to [facility] with comfort care    Discharge Plan: PV    Code Status: Level 4 - Comfort Care      Subjective:   Is having some back pain; breathing is about the same able to speak in full sentences      Objective:     Vitals:   Temp (24hrs), Av 7 °F (36 5 °C), Min:97 1 °F (36 2 °C), Max:98 4 °F (36 9 °C)    Temp:  [97 1 °F (36 2 °C)-98 4 °F (36 9 °C)] 98 4 °F (36 9 °C)  HR:  [106-114] 114  Resp:  [18-20] 18  BP: (126-168)/(52-96) 129/53  SpO2:  [93 %-100 %] 100 %  Body mass index is 26 65 kg/m²  Input and Output Summary (last 24 hours): Intake/Output Summary (Last 24 hours) at 4/15/2020 6849  Last data filed at 4/15/2020 0350  Gross per 24 hour   Intake 100 ml   Output 900 ml   Net -800 ml       Physical Exam:     Physical Exam   Constitutional: She appears well-developed and well-nourished  HENT:   Head: Normocephalic  Neurological: She is alert  Nursing note and vitals reviewed  Patient seen from glass with telephone conversation  Additional Data:     Labs:    Results from last 7 days   Lab Units 04/13/20  1018 04/13/20  0620 04/12/20  1718   WBC Thousand/uL  --  7 24 8 61   HEMOGLOBIN g/dL  --  10 3* 10 9*   HEMATOCRIT %  --  35 4 36 8   PLATELETS Thousands/uL 368 307 338   BANDS PCT %  --  4  --    NEUTROS PCT %  --   --  77*   LYMPHS PCT %  --   --  14   LYMPHO PCT %  --  18  --    MONOS PCT %  --   --  6   MONO PCT %  --  11  --    EOS PCT %  --  3 1     Results from last 7 days   Lab Units 04/13/20  0620 04/12/20  1718   SODIUM mmol/L 141 141   POTASSIUM mmol/L 4 0 4 2   CHLORIDE mmol/L 100 100   CO2 mmol/L 35* 39*   BUN mg/dL 17 18   CREATININE mg/dL 1 13 1 06   ANION GAP mmol/L 6 2*   CALCIUM mg/dL 8 6 8 7   ALBUMIN g/dL  --  2 7*   TOTAL BILIRUBIN mg/dL  --  0 30   ALK PHOS U/L  --  60   ALT U/L  --  11*   AST U/L  --  20   GLUCOSE RANDOM mg/dL 108 127         Results from last 7 days   Lab Units 04/13/20  0548   POC GLUCOSE mg/dl 114         Results from last 7 days   Lab Units 04/13/20  1018 04/12/20  1718   LACTIC ACID mmol/L  --  1 4   PROCALCITONIN ng/ml 0 05  --            * I Have Reviewed All Lab Data Listed Above  * Additional Pertinent Lab Tests Reviewed:  All Labs Within Last 24 Hours Reviewed    Imaging:    Imaging Reports Reviewed Today Include:   Imaging Personally Reviewed by Myself Includes:      Recent Cultures (last 7 days):     Results from last 7 days   Lab Units 04/12/20  1732 04/12/20  1718   BLOOD CULTURE  No Growth at 48 hrs  No Growth at 48 hrs  Last 24 Hours Medication List:     Current Facility-Administered Medications:  albuterol 2 puff Inhalation Q4H PRN Valentine Vee MD   bisacodyl 10 mg Rectal Daily PRN SHELLEY Mckeon   fentaNYL 25 mcg Transdermal Q72H Khloe Collar, PA-C   lidocaine 1 patch Topical Daily Khloe Collar, PA-C   LORazepam 0 5 mg Oral Q6H PRN Ondina Cornell MD   morphine 7 5 mg Oral Q4H PRN Ondina Cornell MD   ondansetron 4 mg Oral Q6H PRN Ondina Cornell MD        Today, Patient Was Seen By: Ondina Cornell MD    ** Please Note: Dictation voice to text software may have been used in the creation of this document   **

## 2020-04-17 LAB
BACTERIA BLD CULT: NORMAL
BACTERIA BLD CULT: NORMAL

## 2020-04-19 ENCOUNTER — APPOINTMENT (EMERGENCY)
Dept: RADIOLOGY | Facility: HOSPITAL | Age: 85
DRG: 871 | End: 2020-04-19
Payer: MEDICARE

## 2020-04-19 ENCOUNTER — HOSPITAL ENCOUNTER (INPATIENT)
Facility: HOSPITAL | Age: 85
LOS: 5 days | Discharge: NURSING, REHAB OR LONG-TERM CARE | DRG: 871 | End: 2020-04-24
Attending: EMERGENCY MEDICINE | Admitting: INTERNAL MEDICINE
Payer: MEDICARE

## 2020-04-19 ENCOUNTER — APPOINTMENT (EMERGENCY)
Dept: CT IMAGING | Facility: HOSPITAL | Age: 85
DRG: 871 | End: 2020-04-19
Payer: MEDICARE

## 2020-04-19 DIAGNOSIS — H72.91 TYMPANIC MEMBRANE PERFORATION, RIGHT: ICD-10-CM

## 2020-04-19 DIAGNOSIS — R06.02 SOB (SHORTNESS OF BREATH): ICD-10-CM

## 2020-04-19 DIAGNOSIS — H70.90 MASTOIDITIS: ICD-10-CM

## 2020-04-19 DIAGNOSIS — U07.1 COVID-19 VIRUS INFECTION: ICD-10-CM

## 2020-04-19 DIAGNOSIS — R09.02 HYPOXIA: ICD-10-CM

## 2020-04-19 DIAGNOSIS — A41.9 SEPSIS (HCC): ICD-10-CM

## 2020-04-19 DIAGNOSIS — H66.91 OTITIS MEDIA, RIGHT: ICD-10-CM

## 2020-04-19 DIAGNOSIS — U07.1 COVID-19: Primary | ICD-10-CM

## 2020-04-19 PROBLEM — G92.9 TOXIC ENCEPHALOPATHY: Status: ACTIVE | Noted: 2020-04-19

## 2020-04-19 LAB
ALBUMIN SERPL BCP-MCNC: 3.7 G/DL (ref 3.5–5)
ALP SERPL-CCNC: 67 U/L (ref 46–116)
ALT SERPL W P-5'-P-CCNC: 18 U/L (ref 12–78)
ANION GAP SERPL CALCULATED.3IONS-SCNC: 9 MMOL/L (ref 4–13)
AST SERPL W P-5'-P-CCNC: 32 U/L (ref 5–45)
ATRIAL RATE: 117 BPM
BACTERIA UR QL AUTO: ABNORMAL /HPF
BASOPHILS # BLD AUTO: 0.03 THOUSANDS/ΜL (ref 0–0.1)
BASOPHILS NFR BLD AUTO: 0 % (ref 0–1)
BILIRUB SERPL-MCNC: 0.5 MG/DL (ref 0.2–1)
BILIRUB UR QL STRIP: NEGATIVE
BUN SERPL-MCNC: 28 MG/DL (ref 5–25)
CALCIUM SERPL-MCNC: 9.7 MG/DL (ref 8.3–10.1)
CHLORIDE SERPL-SCNC: 96 MMOL/L (ref 100–108)
CLARITY UR: CLEAR
CO2 SERPL-SCNC: 34 MMOL/L (ref 21–32)
COLOR UR: YELLOW
CREAT SERPL-MCNC: 1.17 MG/DL (ref 0.6–1.3)
EOSINOPHIL # BLD AUTO: 0.01 THOUSAND/ΜL (ref 0–0.61)
EOSINOPHIL NFR BLD AUTO: 0 % (ref 0–6)
ERYTHROCYTE [DISTWIDTH] IN BLOOD BY AUTOMATED COUNT: 12.7 % (ref 11.6–15.1)
GFR SERPL CREATININE-BSD FRML MDRD: 41 ML/MIN/1.73SQ M
GLUCOSE SERPL-MCNC: 130 MG/DL (ref 65–140)
GLUCOSE UR STRIP-MCNC: NEGATIVE MG/DL
HCT VFR BLD AUTO: 44.3 % (ref 34.8–46.1)
HGB BLD-MCNC: 13.1 G/DL (ref 11.5–15.4)
HGB UR QL STRIP.AUTO: NEGATIVE
HYALINE CASTS #/AREA URNS LPF: ABNORMAL /LPF
IMM GRANULOCYTES # BLD AUTO: 0.18 THOUSAND/UL (ref 0–0.2)
IMM GRANULOCYTES NFR BLD AUTO: 1 % (ref 0–2)
KETONES UR STRIP-MCNC: NEGATIVE MG/DL
LACTATE SERPL-SCNC: 2.1 MMOL/L (ref 0.5–2)
LACTATE SERPL-SCNC: 2.8 MMOL/L (ref 0.5–2)
LEUKOCYTE ESTERASE UR QL STRIP: NEGATIVE
LYMPHOCYTES # BLD AUTO: 1.24 THOUSANDS/ΜL (ref 0.6–4.47)
LYMPHOCYTES NFR BLD AUTO: 6 % (ref 14–44)
MAGNESIUM SERPL-MCNC: 2.4 MG/DL (ref 1.6–2.6)
MCH RBC QN AUTO: 30.8 PG (ref 26.8–34.3)
MCHC RBC AUTO-ENTMCNC: 29.6 G/DL (ref 31.4–37.4)
MCV RBC AUTO: 104 FL (ref 82–98)
MONOCYTES # BLD AUTO: 1.02 THOUSAND/ΜL (ref 0.17–1.22)
MONOCYTES NFR BLD AUTO: 5 % (ref 4–12)
NEUTROPHILS # BLD AUTO: 20.16 THOUSANDS/ΜL (ref 1.85–7.62)
NEUTS SEG NFR BLD AUTO: 88 % (ref 43–75)
NITRITE UR QL STRIP: NEGATIVE
NON-SQ EPI CELLS URNS QL MICRO: ABNORMAL /HPF
NRBC BLD AUTO-RTO: 0 /100 WBCS
NT-PROBNP SERPL-MCNC: 373 PG/ML
PH UR STRIP.AUTO: 6.5 [PH]
PLATELET # BLD AUTO: 584 THOUSANDS/UL (ref 149–390)
PMV BLD AUTO: 9.1 FL (ref 8.9–12.7)
POTASSIUM SERPL-SCNC: 4.9 MMOL/L (ref 3.5–5.3)
PR INTERVAL: 154 MS
PROT SERPL-MCNC: 8.9 G/DL (ref 6.4–8.2)
PROT UR STRIP-MCNC: ABNORMAL MG/DL
QRS AXIS: 102 DEGREES
QRSD INTERVAL: 70 MS
QT INTERVAL: 314 MS
QTC INTERVAL: 438 MS
RBC # BLD AUTO: 4.26 MILLION/UL (ref 3.81–5.12)
RBC #/AREA URNS AUTO: ABNORMAL /HPF
SODIUM SERPL-SCNC: 139 MMOL/L (ref 136–145)
SP GR UR STRIP.AUTO: 1.02 (ref 1–1.03)
T WAVE AXIS: 72 DEGREES
TROPONIN I SERPL-MCNC: <0.02 NG/ML
UROBILINOGEN UR QL STRIP.AUTO: 0.2 E.U./DL
VENTRICULAR RATE: 117 BPM
WBC # BLD AUTO: 22.64 THOUSAND/UL (ref 4.31–10.16)
WBC #/AREA URNS AUTO: ABNORMAL /HPF

## 2020-04-19 PROCEDURE — 71045 X-RAY EXAM CHEST 1 VIEW: CPT

## 2020-04-19 PROCEDURE — 72100 X-RAY EXAM L-S SPINE 2/3 VWS: CPT

## 2020-04-19 PROCEDURE — 99223 1ST HOSP IP/OBS HIGH 75: CPT | Performed by: INTERNAL MEDICINE

## 2020-04-19 PROCEDURE — 84484 ASSAY OF TROPONIN QUANT: CPT | Performed by: PHYSICIAN ASSISTANT

## 2020-04-19 PROCEDURE — 83605 ASSAY OF LACTIC ACID: CPT | Performed by: PHYSICIAN ASSISTANT

## 2020-04-19 PROCEDURE — 96375 TX/PRO/DX INJ NEW DRUG ADDON: CPT

## 2020-04-19 PROCEDURE — 93005 ELECTROCARDIOGRAM TRACING: CPT

## 2020-04-19 PROCEDURE — 72070 X-RAY EXAM THORAC SPINE 2VWS: CPT

## 2020-04-19 PROCEDURE — 70450 CT HEAD/BRAIN W/O DYE: CPT

## 2020-04-19 PROCEDURE — 96365 THER/PROPH/DIAG IV INF INIT: CPT

## 2020-04-19 PROCEDURE — 99285 EMERGENCY DEPT VISIT HI MDM: CPT | Performed by: PHYSICIAN ASSISTANT

## 2020-04-19 PROCEDURE — 83880 ASSAY OF NATRIURETIC PEPTIDE: CPT | Performed by: PHYSICIAN ASSISTANT

## 2020-04-19 PROCEDURE — 87040 BLOOD CULTURE FOR BACTERIA: CPT | Performed by: PHYSICIAN ASSISTANT

## 2020-04-19 PROCEDURE — 99285 EMERGENCY DEPT VISIT HI MDM: CPT

## 2020-04-19 PROCEDURE — 36415 COLL VENOUS BLD VENIPUNCTURE: CPT | Performed by: PHYSICIAN ASSISTANT

## 2020-04-19 PROCEDURE — 94640 AIRWAY INHALATION TREATMENT: CPT

## 2020-04-19 PROCEDURE — 85025 COMPLETE CBC W/AUTO DIFF WBC: CPT | Performed by: PHYSICIAN ASSISTANT

## 2020-04-19 PROCEDURE — 81001 URINALYSIS AUTO W/SCOPE: CPT | Performed by: PHYSICIAN ASSISTANT

## 2020-04-19 PROCEDURE — 93010 ELECTROCARDIOGRAM REPORT: CPT | Performed by: INTERNAL MEDICINE

## 2020-04-19 PROCEDURE — 96367 TX/PROPH/DG ADDL SEQ IV INF: CPT

## 2020-04-19 PROCEDURE — 80053 COMPREHEN METABOLIC PANEL: CPT | Performed by: PHYSICIAN ASSISTANT

## 2020-04-19 PROCEDURE — 83735 ASSAY OF MAGNESIUM: CPT | Performed by: PHYSICIAN ASSISTANT

## 2020-04-19 RX ORDER — ASPIRIN 81 MG/1
81 TABLET ORAL DAILY
Status: DISCONTINUED | OUTPATIENT
Start: 2020-04-19 | End: 2020-04-24 | Stop reason: HOSPADM

## 2020-04-19 RX ORDER — FENTANYL 12 UG/H
1 PATCH TRANSDERMAL
Status: DISCONTINUED | OUTPATIENT
Start: 2020-04-19 | End: 2020-04-24 | Stop reason: HOSPADM

## 2020-04-19 RX ORDER — MAGNESIUM SULFATE HEPTAHYDRATE 40 MG/ML
2 INJECTION, SOLUTION INTRAVENOUS ONCE
Status: COMPLETED | OUTPATIENT
Start: 2020-04-19 | End: 2020-04-19

## 2020-04-19 RX ORDER — ATORVASTATIN CALCIUM 40 MG/1
40 TABLET, FILM COATED ORAL
Status: DISCONTINUED | OUTPATIENT
Start: 2020-04-19 | End: 2020-04-22

## 2020-04-19 RX ORDER — DOCUSATE SODIUM 100 MG/1
100 CAPSULE, LIQUID FILLED ORAL 2 TIMES DAILY
Status: DISCONTINUED | OUTPATIENT
Start: 2020-04-19 | End: 2020-04-24 | Stop reason: HOSPADM

## 2020-04-19 RX ORDER — ONDANSETRON 2 MG/ML
4 INJECTION INTRAMUSCULAR; INTRAVENOUS EVERY 4 HOURS PRN
Status: DISCONTINUED | OUTPATIENT
Start: 2020-04-19 | End: 2020-04-24 | Stop reason: HOSPADM

## 2020-04-19 RX ORDER — SODIUM CHLORIDE 9 MG/ML
75 INJECTION, SOLUTION INTRAVENOUS CONTINUOUS
Status: DISCONTINUED | OUTPATIENT
Start: 2020-04-19 | End: 2020-04-20

## 2020-04-19 RX ORDER — LANOLIN ALCOHOL/MO/W.PET/CERES
3 CREAM (GRAM) TOPICAL
Status: DISCONTINUED | OUTPATIENT
Start: 2020-04-19 | End: 2020-04-24 | Stop reason: HOSPADM

## 2020-04-19 RX ORDER — ACETAMINOPHEN 325 MG/1
650 TABLET ORAL EVERY 6 HOURS PRN
Status: DISCONTINUED | OUTPATIENT
Start: 2020-04-19 | End: 2020-04-24 | Stop reason: HOSPADM

## 2020-04-19 RX ORDER — VANCOMYCIN HYDROCHLORIDE 1 G/200ML
15 INJECTION, SOLUTION INTRAVENOUS ONCE
Status: COMPLETED | OUTPATIENT
Start: 2020-04-19 | End: 2020-04-19

## 2020-04-19 RX ORDER — LORAZEPAM 2 MG/ML
0.25 INJECTION INTRAMUSCULAR ONCE
Status: DISCONTINUED | OUTPATIENT
Start: 2020-04-19 | End: 2020-04-19

## 2020-04-19 RX ORDER — HEPARIN SODIUM 5000 [USP'U]/ML
5000 INJECTION, SOLUTION INTRAVENOUS; SUBCUTANEOUS EVERY 8 HOURS SCHEDULED
Status: DISCONTINUED | OUTPATIENT
Start: 2020-04-19 | End: 2020-04-24 | Stop reason: HOSPADM

## 2020-04-19 RX ORDER — ONDANSETRON 2 MG/ML
4 INJECTION INTRAMUSCULAR; INTRAVENOUS ONCE
Status: COMPLETED | OUTPATIENT
Start: 2020-04-19 | End: 2020-04-19

## 2020-04-19 RX ORDER — PANTOPRAZOLE SODIUM 40 MG/1
40 TABLET, DELAYED RELEASE ORAL DAILY
Status: DISCONTINUED | OUTPATIENT
Start: 2020-04-19 | End: 2020-04-24 | Stop reason: HOSPADM

## 2020-04-19 RX ORDER — CLOPIDOGREL BISULFATE 75 MG/1
75 TABLET ORAL DAILY
Status: DISCONTINUED | OUTPATIENT
Start: 2020-04-19 | End: 2020-04-24 | Stop reason: HOSPADM

## 2020-04-19 RX ORDER — ALBUTEROL SULFATE 2.5 MG/3ML
5 SOLUTION RESPIRATORY (INHALATION) ONCE
Status: COMPLETED | OUTPATIENT
Start: 2020-04-19 | End: 2020-04-19

## 2020-04-19 RX ADMIN — SODIUM CHLORIDE 75 ML/HR: 0.9 INJECTION, SOLUTION INTRAVENOUS at 15:26

## 2020-04-19 RX ADMIN — IPRATROPIUM BROMIDE 0.5 MG: 0.5 SOLUTION RESPIRATORY (INHALATION) at 09:20

## 2020-04-19 RX ADMIN — CLOPIDOGREL BISULFATE 75 MG: 75 TABLET ORAL at 15:34

## 2020-04-19 RX ADMIN — CEFTRIAXONE SODIUM 1000 MG: 10 INJECTION, POWDER, FOR SOLUTION INTRAVENOUS at 17:20

## 2020-04-19 RX ADMIN — HEPARIN SODIUM 5000 UNITS: 5000 INJECTION INTRAVENOUS; SUBCUTANEOUS at 21:27

## 2020-04-19 RX ADMIN — DOCUSATE SODIUM 100 MG: 100 CAPSULE, LIQUID FILLED ORAL at 17:21

## 2020-04-19 RX ADMIN — PANTOPRAZOLE SODIUM 40 MG: 40 TABLET, DELAYED RELEASE ORAL at 15:34

## 2020-04-19 RX ADMIN — MELATONIN 3 MG: at 21:27

## 2020-04-19 RX ADMIN — VANCOMYCIN HYDROCHLORIDE 1000 MG: 1 INJECTION, SOLUTION INTRAVENOUS at 10:18

## 2020-04-19 RX ADMIN — HEPARIN SODIUM 5000 UNITS: 5000 INJECTION INTRAVENOUS; SUBCUTANEOUS at 15:35

## 2020-04-19 RX ADMIN — ALBUTEROL SULFATE 5 MG: 2.5 SOLUTION RESPIRATORY (INHALATION) at 09:20

## 2020-04-19 RX ADMIN — METOPROLOL TARTRATE 25 MG: 25 TABLET, FILM COATED ORAL at 21:27

## 2020-04-19 RX ADMIN — CEFEPIME HYDROCHLORIDE 1000 MG: 1 INJECTION, POWDER, FOR SOLUTION INTRAMUSCULAR; INTRAVENOUS at 09:45

## 2020-04-19 RX ADMIN — ATORVASTATIN CALCIUM 40 MG: 40 TABLET, FILM COATED ORAL at 15:34

## 2020-04-19 RX ADMIN — ASPIRIN 81 MG: 81 TABLET, COATED ORAL at 15:35

## 2020-04-19 RX ADMIN — MAGNESIUM SULFATE HEPTAHYDRATE 2 G: 40 INJECTION, SOLUTION INTRAVENOUS at 09:25

## 2020-04-19 RX ADMIN — ONDANSETRON 4 MG: 2 INJECTION INTRAMUSCULAR; INTRAVENOUS at 09:38

## 2020-04-20 LAB
ALBUMIN SERPL BCP-MCNC: 2.7 G/DL (ref 3.5–5)
ALP SERPL-CCNC: 49 U/L (ref 46–116)
ALT SERPL W P-5'-P-CCNC: 10 U/L (ref 12–78)
ANION GAP SERPL CALCULATED.3IONS-SCNC: 5 MMOL/L (ref 4–13)
AST SERPL W P-5'-P-CCNC: 16 U/L (ref 5–45)
BILIRUB SERPL-MCNC: 0.3 MG/DL (ref 0.2–1)
BUN SERPL-MCNC: 21 MG/DL (ref 5–25)
CALCIUM SERPL-MCNC: 8.1 MG/DL (ref 8.3–10.1)
CHLORIDE SERPL-SCNC: 101 MMOL/L (ref 100–108)
CO2 SERPL-SCNC: 33 MMOL/L (ref 21–32)
CREAT SERPL-MCNC: 0.9 MG/DL (ref 0.6–1.3)
CRP SERPL QL: 21.7 MG/L
D DIMER PPP FEU-MCNC: 5.86 UG/ML FEU
ERYTHROCYTE [DISTWIDTH] IN BLOOD BY AUTOMATED COUNT: 12.8 % (ref 11.6–15.1)
FERRITIN SERPL-MCNC: 241 NG/ML (ref 8–388)
GFR SERPL CREATININE-BSD FRML MDRD: 57 ML/MIN/1.73SQ M
GLUCOSE SERPL-MCNC: 109 MG/DL (ref 65–140)
HCT VFR BLD AUTO: 34.2 % (ref 34.8–46.1)
HGB BLD-MCNC: 10.2 G/DL (ref 11.5–15.4)
MCH RBC QN AUTO: 31.3 PG (ref 26.8–34.3)
MCHC RBC AUTO-ENTMCNC: 29.8 G/DL (ref 31.4–37.4)
MCV RBC AUTO: 105 FL (ref 82–98)
PLATELET # BLD AUTO: 342 THOUSANDS/UL (ref 149–390)
PMV BLD AUTO: 8.7 FL (ref 8.9–12.7)
POTASSIUM SERPL-SCNC: 4.1 MMOL/L (ref 3.5–5.3)
PROCALCITONIN SERPL-MCNC: 0.09 NG/ML
PROT SERPL-MCNC: 6.6 G/DL (ref 6.4–8.2)
RBC # BLD AUTO: 3.26 MILLION/UL (ref 3.81–5.12)
SODIUM SERPL-SCNC: 139 MMOL/L (ref 136–145)
WBC # BLD AUTO: 10.51 THOUSAND/UL (ref 4.31–10.16)

## 2020-04-20 PROCEDURE — 80053 COMPREHEN METABOLIC PANEL: CPT | Performed by: INTERNAL MEDICINE

## 2020-04-20 PROCEDURE — 86140 C-REACTIVE PROTEIN: CPT | Performed by: INTERNAL MEDICINE

## 2020-04-20 PROCEDURE — 84145 PROCALCITONIN (PCT): CPT | Performed by: INTERNAL MEDICINE

## 2020-04-20 PROCEDURE — 82728 ASSAY OF FERRITIN: CPT | Performed by: INTERNAL MEDICINE

## 2020-04-20 PROCEDURE — 85027 COMPLETE CBC AUTOMATED: CPT | Performed by: INTERNAL MEDICINE

## 2020-04-20 PROCEDURE — 99233 SBSQ HOSP IP/OBS HIGH 50: CPT | Performed by: NURSE PRACTITIONER

## 2020-04-20 PROCEDURE — 97163 PT EVAL HIGH COMPLEX 45 MIN: CPT

## 2020-04-20 PROCEDURE — 85379 FIBRIN DEGRADATION QUANT: CPT | Performed by: INTERNAL MEDICINE

## 2020-04-20 PROCEDURE — 97530 THERAPEUTIC ACTIVITIES: CPT

## 2020-04-20 RX ORDER — METHYLPREDNISOLONE SODIUM SUCCINATE 40 MG/ML
40 INJECTION, POWDER, LYOPHILIZED, FOR SOLUTION INTRAMUSCULAR; INTRAVENOUS DAILY
Status: DISCONTINUED | OUTPATIENT
Start: 2020-04-20 | End: 2020-04-23

## 2020-04-20 RX ORDER — FUROSEMIDE 10 MG/ML
40 INJECTION INTRAMUSCULAR; INTRAVENOUS ONCE
Status: COMPLETED | OUTPATIENT
Start: 2020-04-20 | End: 2020-04-20

## 2020-04-20 RX ORDER — MORPHINE SULFATE 15 MG/1
7.5 TABLET ORAL EVERY 6 HOURS PRN
Status: DISCONTINUED | OUTPATIENT
Start: 2020-04-20 | End: 2020-04-24 | Stop reason: HOSPADM

## 2020-04-20 RX ADMIN — METOPROLOL TARTRATE 25 MG: 25 TABLET, FILM COATED ORAL at 22:01

## 2020-04-20 RX ADMIN — CEFTRIAXONE SODIUM 1000 MG: 10 INJECTION, POWDER, FOR SOLUTION INTRAVENOUS at 17:21

## 2020-04-20 RX ADMIN — MORPHINE SULFATE 7.5 MG: 15 TABLET ORAL at 11:52

## 2020-04-20 RX ADMIN — PANTOPRAZOLE SODIUM 40 MG: 40 TABLET, DELAYED RELEASE ORAL at 08:51

## 2020-04-20 RX ADMIN — METHYLPREDNISOLONE SODIUM SUCCINATE 40 MG: 40 INJECTION, POWDER, FOR SOLUTION INTRAMUSCULAR; INTRAVENOUS at 17:21

## 2020-04-20 RX ADMIN — ATORVASTATIN CALCIUM 40 MG: 40 TABLET, FILM COATED ORAL at 17:21

## 2020-04-20 RX ADMIN — HEPARIN SODIUM 5000 UNITS: 5000 INJECTION INTRAVENOUS; SUBCUTANEOUS at 22:03

## 2020-04-20 RX ADMIN — METOPROLOL TARTRATE 25 MG: 25 TABLET, FILM COATED ORAL at 08:51

## 2020-04-20 RX ADMIN — DOCUSATE SODIUM 100 MG: 100 CAPSULE, LIQUID FILLED ORAL at 17:21

## 2020-04-20 RX ADMIN — ASPIRIN 81 MG: 81 TABLET, COATED ORAL at 08:51

## 2020-04-20 RX ADMIN — FUROSEMIDE 40 MG: 10 INJECTION, SOLUTION INTRAMUSCULAR; INTRAVENOUS at 17:21

## 2020-04-20 RX ADMIN — CLOPIDOGREL BISULFATE 75 MG: 75 TABLET ORAL at 08:50

## 2020-04-20 RX ADMIN — HEPARIN SODIUM 5000 UNITS: 5000 INJECTION INTRAVENOUS; SUBCUTANEOUS at 05:18

## 2020-04-20 RX ADMIN — ONDANSETRON 4 MG: 2 INJECTION INTRAMUSCULAR; INTRAVENOUS at 08:50

## 2020-04-20 RX ADMIN — DOCUSATE SODIUM 100 MG: 100 CAPSULE, LIQUID FILLED ORAL at 08:50

## 2020-04-20 RX ADMIN — MELATONIN 3 MG: at 22:02

## 2020-04-21 LAB
ANION GAP SERPL CALCULATED.3IONS-SCNC: 8 MMOL/L (ref 4–13)
BUN SERPL-MCNC: 35 MG/DL (ref 5–25)
CALCIUM SERPL-MCNC: 8.7 MG/DL (ref 8.3–10.1)
CHLORIDE SERPL-SCNC: 94 MMOL/L (ref 100–108)
CO2 SERPL-SCNC: 34 MMOL/L (ref 21–32)
CREAT SERPL-MCNC: 0.98 MG/DL (ref 0.6–1.3)
ERYTHROCYTE [DISTWIDTH] IN BLOOD BY AUTOMATED COUNT: 12.6 % (ref 11.6–15.1)
GFR SERPL CREATININE-BSD FRML MDRD: 51 ML/MIN/1.73SQ M
GLUCOSE SERPL-MCNC: 114 MG/DL (ref 65–140)
HCT VFR BLD AUTO: 39.4 % (ref 34.8–46.1)
HGB BLD-MCNC: 11.6 G/DL (ref 11.5–15.4)
MCH RBC QN AUTO: 30.5 PG (ref 26.8–34.3)
MCHC RBC AUTO-ENTMCNC: 29.4 G/DL (ref 31.4–37.4)
MCV RBC AUTO: 104 FL (ref 82–98)
PLATELET # BLD AUTO: 412 THOUSANDS/UL (ref 149–390)
PMV BLD AUTO: 9.8 FL (ref 8.9–12.7)
POTASSIUM SERPL-SCNC: 3.8 MMOL/L (ref 3.5–5.3)
RBC # BLD AUTO: 3.8 MILLION/UL (ref 3.81–5.12)
SODIUM SERPL-SCNC: 136 MMOL/L (ref 136–145)
WBC # BLD AUTO: 15.89 THOUSAND/UL (ref 4.31–10.16)

## 2020-04-21 PROCEDURE — 99233 SBSQ HOSP IP/OBS HIGH 50: CPT | Performed by: NURSE PRACTITIONER

## 2020-04-21 PROCEDURE — 85027 COMPLETE CBC AUTOMATED: CPT | Performed by: NURSE PRACTITIONER

## 2020-04-21 PROCEDURE — 80048 BASIC METABOLIC PNL TOTAL CA: CPT | Performed by: NURSE PRACTITIONER

## 2020-04-21 PROCEDURE — 97167 OT EVAL HIGH COMPLEX 60 MIN: CPT

## 2020-04-21 PROCEDURE — 97530 THERAPEUTIC ACTIVITIES: CPT

## 2020-04-21 RX ORDER — LORAZEPAM 0.5 MG/1
0.5 TABLET ORAL EVERY 6 HOURS PRN
Status: DISCONTINUED | OUTPATIENT
Start: 2020-04-21 | End: 2020-04-24 | Stop reason: HOSPADM

## 2020-04-21 RX ADMIN — ASPIRIN 81 MG: 81 TABLET, COATED ORAL at 09:25

## 2020-04-21 RX ADMIN — LORAZEPAM 0.5 MG: 0.5 TABLET ORAL at 17:10

## 2020-04-21 RX ADMIN — DOCUSATE SODIUM 100 MG: 100 CAPSULE, LIQUID FILLED ORAL at 09:25

## 2020-04-21 RX ADMIN — PANTOPRAZOLE SODIUM 40 MG: 40 TABLET, DELAYED RELEASE ORAL at 09:26

## 2020-04-21 RX ADMIN — LORAZEPAM 0.5 MG: 0.5 TABLET ORAL at 10:44

## 2020-04-21 RX ADMIN — MORPHINE SULFATE 7.5 MG: 15 TABLET ORAL at 12:04

## 2020-04-21 RX ADMIN — CLOPIDOGREL BISULFATE 75 MG: 75 TABLET ORAL at 09:25

## 2020-04-21 RX ADMIN — DOCUSATE SODIUM 100 MG: 100 CAPSULE, LIQUID FILLED ORAL at 17:10

## 2020-04-21 RX ADMIN — HEPARIN SODIUM 5000 UNITS: 5000 INJECTION INTRAVENOUS; SUBCUTANEOUS at 21:28

## 2020-04-21 RX ADMIN — METOPROLOL TARTRATE 25 MG: 25 TABLET, FILM COATED ORAL at 09:25

## 2020-04-21 RX ADMIN — FENTANYL 1 PATCH: 12.5 PATCH TRANSDERMAL at 17:10

## 2020-04-21 RX ADMIN — METOPROLOL TARTRATE 25 MG: 25 TABLET, FILM COATED ORAL at 21:28

## 2020-04-21 RX ADMIN — ACETAMINOPHEN 650 MG: 325 TABLET ORAL at 12:05

## 2020-04-21 RX ADMIN — HEPARIN SODIUM 5000 UNITS: 5000 INJECTION INTRAVENOUS; SUBCUTANEOUS at 05:02

## 2020-04-21 RX ADMIN — METHYLPREDNISOLONE SODIUM SUCCINATE 40 MG: 40 INJECTION, POWDER, FOR SOLUTION INTRAMUSCULAR; INTRAVENOUS at 09:25

## 2020-04-21 RX ADMIN — CEFTRIAXONE SODIUM 1000 MG: 10 INJECTION, POWDER, FOR SOLUTION INTRAVENOUS at 17:15

## 2020-04-21 RX ADMIN — ATORVASTATIN CALCIUM 40 MG: 40 TABLET, FILM COATED ORAL at 17:10

## 2020-04-21 RX ADMIN — MELATONIN 3 MG: at 21:28

## 2020-04-22 PROCEDURE — 97530 THERAPEUTIC ACTIVITIES: CPT

## 2020-04-22 PROCEDURE — 97110 THERAPEUTIC EXERCISES: CPT

## 2020-04-22 PROCEDURE — 99232 SBSQ HOSP IP/OBS MODERATE 35: CPT | Performed by: NURSE PRACTITIONER

## 2020-04-22 RX ADMIN — ONDANSETRON 4 MG: 2 INJECTION INTRAMUSCULAR; INTRAVENOUS at 00:22

## 2020-04-22 RX ADMIN — HEPARIN SODIUM 5000 UNITS: 5000 INJECTION INTRAVENOUS; SUBCUTANEOUS at 15:25

## 2020-04-22 RX ADMIN — MELATONIN 3 MG: at 21:19

## 2020-04-22 RX ADMIN — METOPROLOL TARTRATE 25 MG: 25 TABLET, FILM COATED ORAL at 09:28

## 2020-04-22 RX ADMIN — PANTOPRAZOLE SODIUM 40 MG: 40 TABLET, DELAYED RELEASE ORAL at 09:28

## 2020-04-22 RX ADMIN — LORAZEPAM 0.5 MG: 0.5 TABLET ORAL at 17:55

## 2020-04-22 RX ADMIN — METHYLPREDNISOLONE SODIUM SUCCINATE 40 MG: 40 INJECTION, POWDER, FOR SOLUTION INTRAMUSCULAR; INTRAVENOUS at 09:28

## 2020-04-22 RX ADMIN — CLOPIDOGREL BISULFATE 75 MG: 75 TABLET ORAL at 09:28

## 2020-04-22 RX ADMIN — ASPIRIN 81 MG: 81 TABLET, COATED ORAL at 09:28

## 2020-04-22 RX ADMIN — METOPROLOL TARTRATE 25 MG: 25 TABLET, FILM COATED ORAL at 21:19

## 2020-04-22 RX ADMIN — DOCUSATE SODIUM 100 MG: 100 CAPSULE, LIQUID FILLED ORAL at 17:55

## 2020-04-22 RX ADMIN — FENTANYL 1 PATCH: 12.5 PATCH TRANSDERMAL at 14:24

## 2020-04-22 RX ADMIN — ONDANSETRON 4 MG: 2 INJECTION INTRAMUSCULAR; INTRAVENOUS at 11:13

## 2020-04-22 RX ADMIN — CEFTRIAXONE SODIUM 1000 MG: 10 INJECTION, POWDER, FOR SOLUTION INTRAVENOUS at 17:55

## 2020-04-22 RX ADMIN — LORAZEPAM 0.5 MG: 0.5 TABLET ORAL at 11:54

## 2020-04-22 RX ADMIN — DOCUSATE SODIUM 100 MG: 100 CAPSULE, LIQUID FILLED ORAL at 09:28

## 2020-04-22 RX ADMIN — LORAZEPAM 0.5 MG: 0.5 TABLET ORAL at 00:22

## 2020-04-22 RX ADMIN — MORPHINE SULFATE 7.5 MG: 15 TABLET ORAL at 12:36

## 2020-04-22 RX ADMIN — HEPARIN SODIUM 5000 UNITS: 5000 INJECTION INTRAVENOUS; SUBCUTANEOUS at 21:19

## 2020-04-22 RX ADMIN — ONDANSETRON 4 MG: 2 INJECTION INTRAMUSCULAR; INTRAVENOUS at 17:55

## 2020-04-23 PROCEDURE — 99232 SBSQ HOSP IP/OBS MODERATE 35: CPT | Performed by: STUDENT IN AN ORGANIZED HEALTH CARE EDUCATION/TRAINING PROGRAM

## 2020-04-23 RX ORDER — PREDNISONE 20 MG/1
40 TABLET ORAL DAILY
Status: DISCONTINUED | OUTPATIENT
Start: 2020-04-24 | End: 2020-04-24 | Stop reason: HOSPADM

## 2020-04-23 RX ORDER — CEFDINIR 300 MG/1
300 CAPSULE ORAL EVERY 12 HOURS SCHEDULED
Status: DISCONTINUED | OUTPATIENT
Start: 2020-04-23 | End: 2020-04-24 | Stop reason: HOSPADM

## 2020-04-23 RX ADMIN — HEPARIN SODIUM 5000 UNITS: 5000 INJECTION INTRAVENOUS; SUBCUTANEOUS at 14:02

## 2020-04-23 RX ADMIN — METOPROLOL TARTRATE 25 MG: 25 TABLET, FILM COATED ORAL at 09:45

## 2020-04-23 RX ADMIN — ASPIRIN 81 MG: 81 TABLET, COATED ORAL at 09:45

## 2020-04-23 RX ADMIN — PANTOPRAZOLE SODIUM 40 MG: 40 TABLET, DELAYED RELEASE ORAL at 09:45

## 2020-04-23 RX ADMIN — MORPHINE SULFATE 7.5 MG: 15 TABLET ORAL at 14:02

## 2020-04-23 RX ADMIN — CLOPIDOGREL BISULFATE 75 MG: 75 TABLET ORAL at 09:45

## 2020-04-23 RX ADMIN — LORAZEPAM 0.5 MG: 0.5 TABLET ORAL at 02:10

## 2020-04-23 RX ADMIN — HEPARIN SODIUM 5000 UNITS: 5000 INJECTION INTRAVENOUS; SUBCUTANEOUS at 20:49

## 2020-04-23 RX ADMIN — ONDANSETRON 4 MG: 2 INJECTION INTRAMUSCULAR; INTRAVENOUS at 02:09

## 2020-04-23 RX ADMIN — CEFDINIR 300 MG: 300 CAPSULE ORAL at 14:02

## 2020-04-23 RX ADMIN — MELATONIN 3 MG: at 20:48

## 2020-04-23 RX ADMIN — DOCUSATE SODIUM 100 MG: 100 CAPSULE, LIQUID FILLED ORAL at 09:45

## 2020-04-23 RX ADMIN — METHYLPREDNISOLONE SODIUM SUCCINATE 40 MG: 40 INJECTION, POWDER, FOR SOLUTION INTRAMUSCULAR; INTRAVENOUS at 09:45

## 2020-04-23 RX ADMIN — CEFDINIR 300 MG: 300 CAPSULE ORAL at 20:47

## 2020-04-23 RX ADMIN — LORAZEPAM 0.5 MG: 0.5 TABLET ORAL at 11:39

## 2020-04-23 RX ADMIN — HEPARIN SODIUM 5000 UNITS: 5000 INJECTION INTRAVENOUS; SUBCUTANEOUS at 05:49

## 2020-04-23 RX ADMIN — DOCUSATE SODIUM 100 MG: 100 CAPSULE, LIQUID FILLED ORAL at 18:33

## 2020-04-23 RX ADMIN — METOPROLOL TARTRATE 25 MG: 25 TABLET, FILM COATED ORAL at 20:48

## 2020-04-23 RX ADMIN — LORAZEPAM 0.5 MG: 0.5 TABLET ORAL at 20:48

## 2020-04-24 VITALS
SYSTOLIC BLOOD PRESSURE: 162 MMHG | BODY MASS INDEX: 24.87 KG/M2 | DIASTOLIC BLOOD PRESSURE: 113 MMHG | HEIGHT: 62 IN | TEMPERATURE: 98 F | RESPIRATION RATE: 18 BRPM | OXYGEN SATURATION: 99 % | HEART RATE: 80 BPM | WEIGHT: 135.14 LBS

## 2020-04-24 LAB
BACTERIA BLD CULT: NORMAL
BACTERIA BLD CULT: NORMAL
BASOPHILS # BLD AUTO: 0.03 THOUSANDS/ΜL (ref 0–0.1)
BASOPHILS NFR BLD AUTO: 0 % (ref 0–1)
D DIMER PPP FEU-MCNC: 3.35 UG/ML FEU
EOSINOPHIL # BLD AUTO: 0.1 THOUSAND/ΜL (ref 0–0.61)
EOSINOPHIL NFR BLD AUTO: 1 % (ref 0–6)
ERYTHROCYTE [DISTWIDTH] IN BLOOD BY AUTOMATED COUNT: 12.5 % (ref 11.6–15.1)
HCT VFR BLD AUTO: 44.9 % (ref 34.8–46.1)
HGB BLD-MCNC: 13.1 G/DL (ref 11.5–15.4)
IMM GRANULOCYTES # BLD AUTO: 0.09 THOUSAND/UL (ref 0–0.2)
IMM GRANULOCYTES NFR BLD AUTO: 1 % (ref 0–2)
LYMPHOCYTES # BLD AUTO: 2.6 THOUSANDS/ΜL (ref 0.6–4.47)
LYMPHOCYTES NFR BLD AUTO: 20 % (ref 14–44)
MCH RBC QN AUTO: 30.7 PG (ref 26.8–34.3)
MCHC RBC AUTO-ENTMCNC: 29.2 G/DL (ref 31.4–37.4)
MCV RBC AUTO: 105 FL (ref 82–98)
MONOCYTES # BLD AUTO: 0.95 THOUSAND/ΜL (ref 0.17–1.22)
MONOCYTES NFR BLD AUTO: 7 % (ref 4–12)
NEUTROPHILS # BLD AUTO: 9.42 THOUSANDS/ΜL (ref 1.85–7.62)
NEUTS SEG NFR BLD AUTO: 71 % (ref 43–75)
NRBC BLD AUTO-RTO: 0 /100 WBCS
PLATELET # BLD AUTO: 417 THOUSANDS/UL (ref 149–390)
PMV BLD AUTO: 10.1 FL (ref 8.9–12.7)
RBC # BLD AUTO: 4.27 MILLION/UL (ref 3.81–5.12)
WBC # BLD AUTO: 13.19 THOUSAND/UL (ref 4.31–10.16)

## 2020-04-24 PROCEDURE — 97530 THERAPEUTIC ACTIVITIES: CPT

## 2020-04-24 PROCEDURE — 85025 COMPLETE CBC W/AUTO DIFF WBC: CPT | Performed by: STUDENT IN AN ORGANIZED HEALTH CARE EDUCATION/TRAINING PROGRAM

## 2020-04-24 PROCEDURE — 99239 HOSP IP/OBS DSCHRG MGMT >30: CPT | Performed by: STUDENT IN AN ORGANIZED HEALTH CARE EDUCATION/TRAINING PROGRAM

## 2020-04-24 PROCEDURE — 97116 GAIT TRAINING THERAPY: CPT

## 2020-04-24 PROCEDURE — 97535 SELF CARE MNGMENT TRAINING: CPT

## 2020-04-24 PROCEDURE — 85379 FIBRIN DEGRADATION QUANT: CPT | Performed by: STUDENT IN AN ORGANIZED HEALTH CARE EDUCATION/TRAINING PROGRAM

## 2020-04-24 RX ORDER — PREDNISONE 20 MG/1
TABLET ORAL
Qty: 13 TABLET | Refills: 0
Start: 2020-04-25 | End: 2020-05-06

## 2020-04-24 RX ORDER — CEFDINIR 300 MG/1
300 CAPSULE ORAL EVERY 12 HOURS SCHEDULED
Qty: 10 CAPSULE | Refills: 0
Start: 2020-04-24 | End: 2020-04-29

## 2020-04-24 RX ORDER — SACCHAROMYCES BOULARDII 250 MG
250 CAPSULE ORAL 2 TIMES DAILY
Qty: 10 CAPSULE | Refills: 0
Start: 2020-04-24 | End: 2020-04-29

## 2020-04-24 RX ADMIN — ONDANSETRON 4 MG: 2 INJECTION INTRAMUSCULAR; INTRAVENOUS at 05:37

## 2020-04-24 RX ADMIN — HEPARIN SODIUM 5000 UNITS: 5000 INJECTION INTRAVENOUS; SUBCUTANEOUS at 05:33

## 2020-04-24 RX ADMIN — DOCUSATE SODIUM 100 MG: 100 CAPSULE, LIQUID FILLED ORAL at 09:15

## 2020-04-24 RX ADMIN — CLOPIDOGREL BISULFATE 75 MG: 75 TABLET ORAL at 09:15

## 2020-04-24 RX ADMIN — ASPIRIN 81 MG: 81 TABLET, COATED ORAL at 09:15

## 2020-04-24 RX ADMIN — PREDNISONE 40 MG: 20 TABLET ORAL at 09:15

## 2020-04-24 RX ADMIN — HEPARIN SODIUM 5000 UNITS: 5000 INJECTION INTRAVENOUS; SUBCUTANEOUS at 12:48

## 2020-04-24 RX ADMIN — METOPROLOL TARTRATE 25 MG: 25 TABLET, FILM COATED ORAL at 09:15

## 2020-04-24 RX ADMIN — LORAZEPAM 0.5 MG: 0.5 TABLET ORAL at 14:59

## 2020-04-24 RX ADMIN — CEFDINIR 300 MG: 300 CAPSULE ORAL at 09:15

## 2020-04-24 RX ADMIN — PANTOPRAZOLE SODIUM 40 MG: 40 TABLET, DELAYED RELEASE ORAL at 09:15

## 2020-04-24 RX ADMIN — LORAZEPAM 0.5 MG: 0.5 TABLET ORAL at 05:37

## 2020-07-01 ENCOUNTER — TELEMEDICINE (OUTPATIENT)
Dept: GASTROENTEROLOGY | Facility: CLINIC | Age: 85
End: 2020-07-01
Payer: MEDICARE

## 2020-07-01 DIAGNOSIS — K59.04 CHRONIC IDIOPATHIC CONSTIPATION: ICD-10-CM

## 2020-07-01 DIAGNOSIS — R11.2 NON-INTRACTABLE VOMITING WITH NAUSEA, UNSPECIFIED VOMITING TYPE: ICD-10-CM

## 2020-07-01 DIAGNOSIS — R11.0 NAUSEA: Primary | ICD-10-CM

## 2020-07-01 PROCEDURE — 99213 OFFICE O/P EST LOW 20 MIN: CPT | Performed by: INTERNAL MEDICINE

## 2020-07-01 NOTE — PROGRESS NOTES
It was my intent to perform this visit via video technology but the patient was not able to do a video connection so the visit was completed via audio telephone only  Virtual Brief Visit    Assessment/Plan:    1  Nausea    2  Non-intractable vomiting with nausea, unspecified vomiting type    3  Chronic idiopathic constipation    No additional diagnostic workup is required in this patient  Continue the current medical therapy as prescribed  Problem List Items Addressed This Visit        Other    Constipation      Other Visit Diagnoses     Nausea    -  Primary    Non-intractable vomiting with nausea, unspecified vomiting type                    Reason for visit is   Chief Complaint   Patient presents with    Virtual Brief Visit        Encounter provider Linda Reed DO    Provider located at [address] [place] [address] RT R [facility] [address] [id number] [phone]    Recent Visits  No visits were found meeting these conditions  Showing recent visits within past 7 days and meeting all other requirements     Today's Visits  Date Type Provider Dept   07/01/20 Telemedicine Linda Reed DO [facility] [place]   Showing today's visits and meeting all other requirements     Future Appointments  No visits were found meeting these conditions  Showing future appointments within next 150 days and meeting all other requirements        After connecting through [employer] Teams, the patient was identified by name and date of birth  Taya Valentine was informed that this is a telemedicine visit and that the visit is being conducted through [employer]  My office door was closed  No one else was in the room  She acknowledged consent and understanding of privacy and security of the platform  The patient has agreed to participate and understands she can discontinue the visit at any time  Patient is aware this is a billable service       [facility] Katelyn Jacob is a 80 y o  female is currently at the rehab/nursing home facility  I was able to speak with the patient directly but due to her difficulty with hearing the attending nurse was also assisting me with this telephone call  Patient states that she is currently taking pain medication on a daily basis for back pain  She does however denied having any problems with constipation at the present time  She is having a bowel movement on a regular daily basis  Furthermore she denies any nausea or any vomiting at the present time  She is eating all 3 of her meals without difficulty  There has been no report of rectal bleeding or hematemesis  Patient denies any heartburn or dysphagia  Johana Lebanon   HPI     Past Medical History:   Diagnosis Date    Cardiac disease     [facility]ch[facility]) ([facility] )     Hyperlipidemia     Hypertension     MI (myocardial infarction) ([place] [address] )     PVD (peripheral vascular disease) ([place] [address] )     Stroke ([place] [address] )     x4       Past Surgical History:   Procedure Laterality Date    APPENDECTOMY      BREAST SURGERY      CHOLECYSTECTOMY      CORONARY ANGIOPLASTY      ESOPHAGUS FOREIGN BODY REMOVAL N/A 6/13/2017    Procedure: REMOVAL FOREIGN BODY ESOPHAGUS;  Surgeon: Delmi Gaines MD;  Location: [facility];  Service: Gastroenterology       Current Outpatient Medications   Medication Sig Dispense Refill    acetaminophen (TYLENOL) 325 mg tablet Take 650 mg by mouth every 6 (six) hours as needed for mild pain      albuterol (2 5 mg/3 mL) 0 083 % nebulizer solution Take 1 vial (2 5 mg total) by nebulization every 6 (six) hours as needed for wheezing  0    aspirin (ECOTRIN LOW STRENGTH) 81 mg EC tablet Take 81 mg by mouth daily      bisacodyl (DULCOLAX) 10 mg suppository Insert 10 mg into the rectum daily      clopidogrel (PLAVIX) 75 mg tablet Take 75 mg by mouth daily      docusate sodium (COLACE) 100 mg capsule Take 100 mg by mouth 2 (two) times a day      fentaNYL (DURAGESIC) 12 mcg/hr TD 72 hr patch Place 1 patch on the skin every third day       FEROSUL 325 (65 Fe) MG tablet       gabapentin (NEURONTIN) 100 mg capsule       LORazepam (ATIVAN) 0 5 mg tablet Take 1 tablet (0 5 mg total) by mouth every 6 (six) hours as needed for anxiety for up to 10 days 12 tablet 0    magnesium hydroxide (MILK OF MAGNESIA) 400 mg/5 mL oral suspension Take 30 mL by mouth daily as needed for constipation      melatonin 3 mg Take 3 mg by mouth daily at bedtime      menthol-cetylpyridinium (CEPACOL) 3 MG lozenge Take 1 lozenge by mouth as needed for sore throat      methocarbamol (ROBAXIN) 750 mg tablet Take 500 mg by mouth every 6 (six) hours as needed for muscle spasms      metoprolol tartrate (LOPRESSOR) 25 mg tablet Take 25 mg by mouth every 12 (twelve) hours      nitroglycerin (NITROSTAT) 0 4 mg SL tablet       ondansetron (ZOFRAN) 4 mg tablet Take 4 mg by mouth every 8 (eight) hours as needed for nausea or vomiting      pantoprazole (PROTONIX) 40 mg tablet Take 40 mg by mouth daily      rosuvastatin (CRESTOR) 10 MG tablet Take 10 mg by mouth daily       No current facility-administered medications for this visit  Allergies   Allergen Reactions    Amoxicillin Hives and Vomiting     unsure    Mevacor [Lovastatin] Myalgia    Penicillins Hives and Vomiting     unsure       Review of Systems   Constitutional: Negative for fatigue and fever  HENT: Positive for hearing loss  Negative for sore throat and trouble swallowing  Eyes: Negative for pain and visual disturbance  Respiratory: Positive for shortness of breath  Negative for cough and chest tightness  Cardiovascular: Negative for chest pain and palpitations  Gastrointestinal: Negative for abdominal pain, anal bleeding, blood in stool, constipation, diarrhea, nausea, rectal pain and vomiting  Genitourinary: Negative for difficulty urinating and dysuria     Musculoskeletal: Positive for arthralgias, back pain and gait problem  Skin: Negative for color change and rash  Neurological: Negative for dizziness and headaches  Psychiatric/Behavioral: Negative for agitation and confusion  There were no vitals filed for this visit  As a result of this visit, I have not referred the patient for further respiratory evaluation  I spent 13 minutes directly with the patient during this visit     Total time required to complete this consultation was 22 minutes  VIRTUAL VISIT DISCLAIMER    Patti Cobian acknowledges that she has consented to an online visit or consultation  She understands that the online visit is based solely on information provided by her, and that, in the absence of a face-to-face physical evaluation by the physician, the diagnosis she receives is both limited and provisional in terms of accuracy and completeness  This is not intended to replace a full medical face-to-face evaluation by the physician  Patti Cobian understands and accepts these terms